# Patient Record
Sex: MALE | Race: WHITE | Employment: OTHER | URBAN - METROPOLITAN AREA
[De-identification: names, ages, dates, MRNs, and addresses within clinical notes are randomized per-mention and may not be internally consistent; named-entity substitution may affect disease eponyms.]

---

## 2017-01-04 ENCOUNTER — TELEPHONE (OUTPATIENT)
Dept: CARDIOTHORACIC SURGERY | Age: 82
End: 2017-01-04

## 2017-01-12 ENCOUNTER — ANTI-COAG VISIT (OUTPATIENT)
Dept: PHARMACY | Age: 82
End: 2017-01-12

## 2017-01-12 DIAGNOSIS — I48.19 PERSISTENT ATRIAL FIBRILLATION (HCC): ICD-10-CM

## 2017-01-12 LAB — INR BLD: 2

## 2017-01-19 ENCOUNTER — TELEPHONE (OUTPATIENT)
Dept: FAMILY MEDICINE CLINIC | Age: 82
End: 2017-01-19

## 2017-01-19 RX ORDER — LANCETS
EACH MISCELLANEOUS
COMMUNITY
End: 2017-01-19 | Stop reason: SDUPTHER

## 2017-01-19 RX ORDER — LANCETS
2-4 EACH MISCELLANEOUS DAILY
Qty: 100 EACH | Refills: 3 | Status: SHIPPED | OUTPATIENT
Start: 2017-01-19 | End: 2019-06-03 | Stop reason: SDUPTHER

## 2017-01-20 ENCOUNTER — TELEPHONE (OUTPATIENT)
Dept: FAMILY MEDICINE CLINIC | Age: 82
End: 2017-01-20

## 2017-02-08 ENCOUNTER — ANTI-COAG VISIT (OUTPATIENT)
Dept: PHARMACY | Age: 82
End: 2017-02-08

## 2017-02-08 DIAGNOSIS — I48.19 PERSISTENT ATRIAL FIBRILLATION (HCC): ICD-10-CM

## 2017-02-08 LAB
INR BLD: 2
PROTIME: 24.4 SECONDS

## 2017-02-10 ENCOUNTER — HOSPITAL ENCOUNTER (OUTPATIENT)
Dept: OTHER | Age: 82
Discharge: HOME | End: 2017-02-10
Attending: FAMILY MEDICINE | Admitting: FAMILY MEDICINE

## 2017-02-10 LAB
A/G RATIO: 1.1 (ref 1.1–2.2)
ALBUMIN SERPL-MCNC: 4 G/DL (ref 3.4–5)
ALP BLD-CCNC: 87 U/L (ref 40–129)
ALT SERPL-CCNC: 13 U/L (ref 10–40)
ANION GAP SERPL CALCULATED.3IONS-SCNC: 13 MMOL/L (ref 3–16)
AST SERPL-CCNC: 15 U/L (ref 15–37)
BASOPHILS ABSOLUTE: 0 K/UL (ref 0–0.2)
BASOPHILS RELATIVE PERCENT: 0.8 %
BILIRUB SERPL-MCNC: 0.9 MG/DL (ref 0–1)
BUN BLDV-MCNC: 12 MG/DL (ref 7–20)
CALCIUM SERPL-MCNC: 9.4 MG/DL (ref 8.3–10.6)
CHLORIDE BLD-SCNC: 101 MMOL/L (ref 99–110)
CHOLESTEROL, TOTAL: 116 MG/DL (ref 0–199)
CO2: 26 MMOL/L (ref 21–32)
CREAT SERPL-MCNC: 0.6 MG/DL (ref 0.8–1.3)
EOSINOPHILS ABSOLUTE: 0.2 K/UL (ref 0–0.6)
EOSINOPHILS RELATIVE PERCENT: 2.7 %
ESTIMATED AVERAGE GLUCOSE: 185.8 MG/DL
GFR AFRICAN AMERICAN: >60
GFR NON-AFRICAN AMERICAN: >60
GLOBULIN: 3.7 G/DL
GLUCOSE BLD-MCNC: 187 MG/DL (ref 70–99)
HBA1C MFR BLD: 8.1 %
HCT VFR BLD CALC: 43.4 % (ref 40.5–52.5)
HDLC SERPL-MCNC: 46 MG/DL (ref 40–60)
HEMOGLOBIN: 14.5 G/DL (ref 13.5–17.5)
LDL CHOLESTEROL CALCULATED: 53 MG/DL
LYMPHOCYTES ABSOLUTE: 0.7 K/UL (ref 1–5.1)
LYMPHOCYTES RELATIVE PERCENT: 11.3 %
MCH RBC QN AUTO: 31.6 PG (ref 26–34)
MCHC RBC AUTO-ENTMCNC: 33.4 G/DL (ref 31–36)
MCV RBC AUTO: 94.5 FL (ref 80–100)
MONOCYTES ABSOLUTE: 0.4 K/UL (ref 0–1.3)
MONOCYTES RELATIVE PERCENT: 6.4 %
NEUTROPHILS ABSOLUTE: 4.8 K/UL (ref 1.7–7.7)
NEUTROPHILS RELATIVE PERCENT: 78.8 %
PDW BLD-RTO: 14 % (ref 12.4–15.4)
PLATELET # BLD: 168 K/UL (ref 135–450)
PMV BLD AUTO: 8.1 FL (ref 5–10.5)
POTASSIUM SERPL-SCNC: 5.1 MMOL/L (ref 3.5–5.1)
RBC # BLD: 4.59 M/UL (ref 4.2–5.9)
SODIUM BLD-SCNC: 140 MMOL/L (ref 136–145)
TOTAL PROTEIN: 7.7 G/DL (ref 6.4–8.2)
TRIGL SERPL-MCNC: 85 MG/DL (ref 0–150)
VLDLC SERPL CALC-MCNC: 17 MG/DL
WBC # BLD: 6.1 K/UL (ref 4–11)

## 2017-02-16 ENCOUNTER — TELEPHONE (OUTPATIENT)
Dept: FAMILY MEDICINE CLINIC | Age: 82
End: 2017-02-16

## 2017-02-16 DIAGNOSIS — E11.9 DIABETES MELLITUS TYPE 2 IN NONOBESE (HCC): Primary | ICD-10-CM

## 2017-02-28 ENCOUNTER — TELEPHONE (OUTPATIENT)
Dept: FAMILY MEDICINE CLINIC | Age: 82
End: 2017-02-28

## 2017-03-01 ENCOUNTER — HOSPITAL ENCOUNTER (OUTPATIENT)
Dept: OTHER | Age: 82
Discharge: HOME | End: 2017-03-31
Attending: INTERNAL MEDICINE | Admitting: INTERNAL MEDICINE

## 2017-03-02 RX ORDER — GLIMEPIRIDE 1 MG/1
1 TABLET ORAL EVERY MORNING
Qty: 30 TABLET | Refills: 3 | Status: SHIPPED | OUTPATIENT
Start: 2017-03-02 | End: 2017-04-24 | Stop reason: SDUPTHER

## 2017-03-09 ENCOUNTER — ANTI-COAG VISIT (OUTPATIENT)
Dept: PHARMACY | Age: 82
End: 2017-03-09

## 2017-03-09 DIAGNOSIS — I48.19 PERSISTENT ATRIAL FIBRILLATION (HCC): ICD-10-CM

## 2017-03-09 LAB — INR BLD: 2

## 2017-03-27 RX ORDER — METOPROLOL SUCCINATE 100 MG/1
100 TABLET, EXTENDED RELEASE ORAL SEE ADMIN INSTRUCTIONS
Qty: 45 TABLET | Refills: 1 | Status: SHIPPED | OUTPATIENT
Start: 2017-03-27 | End: 2017-03-29 | Stop reason: DRUGHIGH

## 2017-03-29 ENCOUNTER — TELEPHONE (OUTPATIENT)
Dept: FAMILY MEDICINE CLINIC | Age: 82
End: 2017-03-29

## 2017-03-29 RX ORDER — METOPROLOL SUCCINATE 100 MG/1
TABLET, EXTENDED RELEASE ORAL
Qty: 45 TABLET | Refills: 1
Start: 2017-03-29 | End: 2017-09-27 | Stop reason: SDUPTHER

## 2017-04-06 ENCOUNTER — ANTI-COAG VISIT (OUTPATIENT)
Dept: PHARMACY | Age: 82
End: 2017-04-06

## 2017-04-06 DIAGNOSIS — I48.19 PERSISTENT ATRIAL FIBRILLATION (HCC): ICD-10-CM

## 2017-04-06 LAB — INR BLD: 2.2

## 2017-04-24 ENCOUNTER — TELEPHONE (OUTPATIENT)
Dept: FAMILY MEDICINE CLINIC | Age: 82
End: 2017-04-24

## 2017-04-24 RX ORDER — GLIMEPIRIDE 1 MG/1
1 TABLET ORAL EVERY MORNING
Qty: 90 TABLET | Refills: 1 | Status: SHIPPED | OUTPATIENT
Start: 2017-04-24 | End: 2017-09-27 | Stop reason: SDUPTHER

## 2017-04-24 RX ORDER — DILTIAZEM HYDROCHLORIDE 120 MG/1
120 CAPSULE, COATED, EXTENDED RELEASE ORAL DAILY
Qty: 90 CAPSULE | Refills: 1 | Status: SHIPPED | OUTPATIENT
Start: 2017-04-24 | End: 2017-09-27 | Stop reason: SDUPTHER

## 2017-05-04 ENCOUNTER — ANTI-COAG VISIT (OUTPATIENT)
Dept: PHARMACY | Age: 82
End: 2017-05-04

## 2017-05-04 DIAGNOSIS — I48.19 PERSISTENT ATRIAL FIBRILLATION (HCC): ICD-10-CM

## 2017-05-04 LAB
INR BLD: 2.8
PROTIME: 33.6 SECONDS

## 2017-05-10 ENCOUNTER — HOSPITAL ENCOUNTER (OUTPATIENT)
Dept: OTHER | Age: 82
Discharge: HOME | End: 2017-05-10
Attending: FAMILY MEDICINE | Admitting: FAMILY MEDICINE

## 2017-05-10 LAB
ESTIMATED AVERAGE GLUCOSE: 145.6 MG/DL
HBA1C MFR BLD: 6.7 %

## 2017-05-17 ENCOUNTER — TELEPHONE (OUTPATIENT)
Dept: FAMILY MEDICINE CLINIC | Age: 82
End: 2017-05-17

## 2017-06-01 ENCOUNTER — ANTI-COAG VISIT (OUTPATIENT)
Dept: PHARMACY | Age: 82
End: 2017-06-01

## 2017-06-01 DIAGNOSIS — I48.19 PERSISTENT ATRIAL FIBRILLATION (HCC): ICD-10-CM

## 2017-06-01 LAB
INR BLD: 2.8
PROTIME: 34 SECONDS

## 2017-07-06 ENCOUNTER — ANTI-COAG VISIT (OUTPATIENT)
Dept: PHARMACY | Age: 82
End: 2017-07-06

## 2017-07-06 DIAGNOSIS — I48.19 PERSISTENT ATRIAL FIBRILLATION (HCC): ICD-10-CM

## 2017-07-06 LAB
INR BLD: 2.1
PROTIME: 25.2 SECONDS

## 2017-07-28 ENCOUNTER — TELEPHONE (OUTPATIENT)
Dept: FAMILY MEDICINE CLINIC | Age: 82
End: 2017-07-28

## 2017-08-01 ENCOUNTER — ANTI-COAG VISIT (OUTPATIENT)
Dept: PHARMACY | Age: 82
End: 2017-08-01

## 2017-08-01 DIAGNOSIS — I48.19 PERSISTENT ATRIAL FIBRILLATION (HCC): ICD-10-CM

## 2017-08-01 LAB
INR BLD: 2.2
PROTIME: 26.6 SECONDS

## 2017-08-07 ENCOUNTER — HOSPITAL ENCOUNTER (OUTPATIENT)
Dept: OTHER | Age: 82
Discharge: HOME | End: 2017-08-07
Attending: FAMILY MEDICINE | Admitting: FAMILY MEDICINE

## 2017-08-07 LAB
A/G RATIO: 1.1 (ref 1.1–2.2)
ALBUMIN SERPL-MCNC: 4.1 G/DL (ref 3.4–5)
ALP BLD-CCNC: 73 U/L (ref 40–129)
ALT SERPL-CCNC: 14 U/L (ref 10–40)
ANION GAP SERPL CALCULATED.3IONS-SCNC: 15 MMOL/L (ref 3–16)
AST SERPL-CCNC: 19 U/L (ref 15–37)
BASOPHILS ABSOLUTE: 0 K/UL (ref 0–0.2)
BASOPHILS RELATIVE PERCENT: 0.5 %
BILIRUB SERPL-MCNC: 0.8 MG/DL (ref 0–1)
BUN BLDV-MCNC: 12 MG/DL (ref 7–20)
CALCIUM SERPL-MCNC: 9.4 MG/DL (ref 8.3–10.6)
CHLORIDE BLD-SCNC: 102 MMOL/L (ref 99–110)
CHOLESTEROL, TOTAL: 115 MG/DL (ref 0–199)
CO2: 25 MMOL/L (ref 21–32)
CREAT SERPL-MCNC: 0.7 MG/DL (ref 0.8–1.3)
EOSINOPHILS ABSOLUTE: 0.2 K/UL (ref 0–0.6)
EOSINOPHILS RELATIVE PERCENT: 3.4 %
ESTIMATED AVERAGE GLUCOSE: 137 MG/DL
GFR AFRICAN AMERICAN: >60
GFR NON-AFRICAN AMERICAN: >60
GLOBULIN: 3.6 G/DL
GLUCOSE BLD-MCNC: 135 MG/DL (ref 70–99)
HBA1C MFR BLD: 6.4 %
HCT VFR BLD CALC: 41.6 % (ref 40.5–52.5)
HDLC SERPL-MCNC: 47 MG/DL (ref 40–60)
HEMOGLOBIN: 14.2 G/DL (ref 13.5–17.5)
LDL CHOLESTEROL CALCULATED: 50 MG/DL
LYMPHOCYTES ABSOLUTE: 0.8 K/UL (ref 1–5.1)
LYMPHOCYTES RELATIVE PERCENT: 12.9 %
MCH RBC QN AUTO: 32.6 PG (ref 26–34)
MCHC RBC AUTO-ENTMCNC: 34.2 G/DL (ref 31–36)
MCV RBC AUTO: 95.6 FL (ref 80–100)
MONOCYTES ABSOLUTE: 0.5 K/UL (ref 0–1.3)
MONOCYTES RELATIVE PERCENT: 7.6 %
NEUTROPHILS ABSOLUTE: 4.8 K/UL (ref 1.7–7.7)
NEUTROPHILS RELATIVE PERCENT: 75.6 %
PDW BLD-RTO: 14.6 % (ref 12.4–15.4)
PLATELET # BLD: 178 K/UL (ref 135–450)
PMV BLD AUTO: 8.1 FL (ref 5–10.5)
POTASSIUM SERPL-SCNC: 4.2 MMOL/L (ref 3.5–5.1)
RBC # BLD: 4.36 M/UL (ref 4.2–5.9)
SODIUM BLD-SCNC: 142 MMOL/L (ref 136–145)
TOTAL PROTEIN: 7.7 G/DL (ref 6.4–8.2)
TRIGL SERPL-MCNC: 88 MG/DL (ref 0–150)
VLDLC SERPL CALC-MCNC: 18 MG/DL
WBC # BLD: 6.3 K/UL (ref 4–11)

## 2017-08-08 LAB — URINE CULTURE, ROUTINE: NORMAL

## 2017-08-21 ENCOUNTER — TELEPHONE (OUTPATIENT)
Dept: FAMILY MEDICINE CLINIC | Age: 82
End: 2017-08-21

## 2017-08-29 ENCOUNTER — ANTI-COAG VISIT (OUTPATIENT)
Dept: PHARMACY | Age: 82
End: 2017-08-29

## 2017-08-29 DIAGNOSIS — I48.19 PERSISTENT ATRIAL FIBRILLATION (HCC): ICD-10-CM

## 2017-08-29 LAB
INR BLD: 2.2
PROTIME: 27 SECONDS

## 2017-09-26 ENCOUNTER — ANTI-COAG VISIT (OUTPATIENT)
Dept: PHARMACY | Age: 82
End: 2017-09-26

## 2017-09-26 DIAGNOSIS — I48.19 PERSISTENT ATRIAL FIBRILLATION (HCC): ICD-10-CM

## 2017-09-26 LAB
INR BLD: 1.8
PROTIME: 21.2 SECONDS

## 2017-10-24 ENCOUNTER — ANTI-COAG VISIT (OUTPATIENT)
Dept: PHARMACY | Age: 82
End: 2017-10-24

## 2017-10-24 DIAGNOSIS — I48.19 PERSISTENT ATRIAL FIBRILLATION (HCC): ICD-10-CM

## 2017-10-24 LAB
INR BLD: 1.8
PROTIME: 21.8 SECONDS

## 2017-10-25 ENCOUNTER — TELEPHONE (OUTPATIENT)
Dept: FAMILY MEDICINE CLINIC | Age: 82
End: 2017-10-25

## 2017-10-25 NOTE — TELEPHONE ENCOUNTER
metoprolol succinate (TOPROL XL) 100 MG extended release tablet 45 tablet 0 9/27/2017     Sig: TAKE 1/2 TABLET EVERY DAY        glimepiride (AMARYL) 1 MG tablet 90 tablet 0 9/27/2017     Sig: TAKE 1 TABLET EVERY MORNING        diltiazem (CARDIZEM CD) 120 MG extended release capsule (Discontinued) 90 capsule 1 4/24/2017 9/27/2017    Sig - Route:  Take 1 capsule by mouth daily - Oral        All the above should be 90-day supply    [facility]

## 2017-10-26 RX ORDER — METOPROLOL SUCCINATE 100 MG/1
TABLET, EXTENDED RELEASE ORAL
Qty: 45 TABLET | Refills: 0 | Status: SHIPPED | OUTPATIENT
Start: 2017-10-26 | End: 2018-03-23 | Stop reason: SDUPTHER

## 2017-10-26 RX ORDER — GLIMEPIRIDE 1 MG/1
TABLET ORAL
Qty: 90 TABLET | Refills: 0 | Status: SHIPPED | OUTPATIENT
Start: 2017-10-26 | End: 2018-03-23 | Stop reason: SDUPTHER

## 2017-10-26 RX ORDER — DILTIAZEM HYDROCHLORIDE 120 MG/1
CAPSULE, COATED, EXTENDED RELEASE ORAL
Qty: 90 CAPSULE | Refills: 0 | Status: SHIPPED | OUTPATIENT
Start: 2017-10-26 | End: 2018-03-23 | Stop reason: SDUPTHER

## 2017-11-01 ENCOUNTER — HOSPITAL ENCOUNTER (OUTPATIENT)
Dept: OTHER | Age: 82
Discharge: HOME | End: 2017-11-30
Attending: INTERNAL MEDICINE | Admitting: INTERNAL MEDICINE

## 2017-11-21 ENCOUNTER — ANTI-COAG VISIT (OUTPATIENT)
Dept: PHARMACY | Age: 82
End: 2017-11-21

## 2017-11-21 DIAGNOSIS — I48.19 PERSISTENT ATRIAL FIBRILLATION (HCC): ICD-10-CM

## 2017-11-21 LAB
INR BLD: 2.1
PROTIME: 25 SECONDS

## 2017-12-01 ENCOUNTER — HOSPITAL ENCOUNTER (OUTPATIENT)
Dept: OTHER | Age: 82
Discharge: HOME | End: 2017-12-31
Attending: INTERNAL MEDICINE | Admitting: INTERNAL MEDICINE

## 2017-12-19 ENCOUNTER — ANTI-COAG VISIT (OUTPATIENT)
Dept: PHARMACY | Age: 82
End: 2017-12-19

## 2017-12-19 DIAGNOSIS — I48.19 PERSISTENT ATRIAL FIBRILLATION (HCC): ICD-10-CM

## 2017-12-19 LAB
INR BLD: 2.2
PROTIME: 26.2 SECONDS

## 2017-12-21 ENCOUNTER — TELEPHONE (OUTPATIENT)
Dept: VASCULAR SURGERY | Age: 82
End: 2017-12-21

## 2017-12-21 DIAGNOSIS — I65.23 BILATERAL CAROTID ARTERY STENOSIS: Primary | ICD-10-CM

## 2017-12-22 ENCOUNTER — OFFICE VISIT (OUTPATIENT)
Dept: VASCULAR SURGERY | Age: 82
End: 2017-12-22

## 2017-12-22 ENCOUNTER — HOSPITAL ENCOUNTER (OUTPATIENT)
Dept: OTHER | Age: 82
Discharge: HOME | End: 2017-12-22

## 2017-12-22 VITALS — HEIGHT: 70 IN | BODY MASS INDEX: 30.35 KG/M2 | WEIGHT: 212 LBS

## 2017-12-22 DIAGNOSIS — I77.9 BILATERAL CAROTID ARTERY DISEASE (HCC): Primary | ICD-10-CM

## 2017-12-22 DIAGNOSIS — I65.23 CAROTID ATHEROSCLEROSIS, BILATERAL: Primary | ICD-10-CM

## 2017-12-22 PROCEDURE — G8484 FLU IMMUNIZE NO ADMIN: HCPCS | Performed by: SURGERY

## 2017-12-22 PROCEDURE — 1036F TOBACCO NON-USER: CPT | Performed by: SURGERY

## 2017-12-22 PROCEDURE — 99213 OFFICE O/P EST LOW 20 MIN: CPT | Performed by: SURGERY

## 2017-12-22 PROCEDURE — 1123F ACP DISCUSS/DSCN MKR DOCD: CPT | Performed by: SURGERY

## 2017-12-22 PROCEDURE — 4040F PNEUMOC VAC/ADMIN/RCVD: CPT | Performed by: SURGERY

## 2017-12-22 PROCEDURE — G8598 ASA/ANTIPLAT THER USED: HCPCS | Performed by: SURGERY

## 2017-12-22 PROCEDURE — G8427 DOCREV CUR MEDS BY ELIG CLIN: HCPCS | Performed by: SURGERY

## 2017-12-22 PROCEDURE — G8417 CALC BMI ABV UP PARAM F/U: HCPCS | Performed by: SURGERY

## 2017-12-22 NOTE — PROGRESS NOTES
TAKE 1 TABLET EVERY MORNING 90 tablet 0    diltiazem (CARTIA XT) 120 MG extended release capsule TAKE 1 CAPSULE EVERY DAY 90 capsule 0    glucose blood VI test strips (ONETOUCH VERIO) strip 1 each by In Vitro route See Admin Instructions Test 2-4 times a day E11.9 100 each 3    dorzolamide (TRUSOPT) 2 % ophthalmic solution Place 1 drop into both eyes 3 times daily      simvastatin (ZOCOR) 20 MG tablet Take 1 tablet by mouth nightly 90 tablet 3    Lancets Thin MISC 2-4 Devices by Does not apply route daily 2-4 leilani daily 100 each 3    finasteride (PROSCAR) 5 MG tablet Take 5 mg by mouth daily      tamsulosin (FLOMAX) 0.4 MG capsule Take 1 capsule by mouth daily. (Patient taking differently:   Take 0.4 mg by mouth 2 times daily ) 90 capsule 3    warfarin (COUMADIN) 5 MG tablet Take 7.5 mg (one and a half tablets) coumadin every evening until directed otherwise by Dr. Crystal Rutledge (Patient taking differently: Take 5 mg by mouth See Admin Instructions. 7.5mg on Thu and 5mg all other days) 90 tablet 3    brimonidine-timolol (COMBIGAN) 0.2-0.5 % ophthalmic solution Place 1 drop into both eyes every 12 hours.  latanoprost (XALATAN) 0.005 % ophthalmic solution 1 drop nightly. Right eye       No current facility-administered medications for this visit. Allergies:  Hydrochlorothiazide; Metformin and related; and Morphine     Review of Systems:   · Constitutional: there has been no unanticipated weight loss. There's been no change in energy level, sleep pattern, or activity level. · Eyes: No visual changes or diplopia. No scleral icterus. · ENT: No Headaches, hearing loss or vertigo. No mouth sores or sore throat. · Cardiovascular: Reviewed in HPI  · Respiratory: No cough or wheezing, no sputum production. No hematemesis. · Gastrointestinal: No abdominal pain, appetite loss, blood in stools. No change in bowel or bladder habits.   · Genitourinary: No dysuria, trouble voiding, or nonobese (HCC)    Cancer of colon ([facility] [address]    Carotid artery disease ([facility] [address]    Cataract    Glaucoma    BPH with urinary obstruction       Plan:  1. Carotid atherosclerosis, bilateral  72-year-old male with stable asymptomatic carotid atherosclerosis. No interval progression of his carotid duplex scan. Continue current medical regimen. Repeat carotid duplex scan in 1 year. - VL DUP CAROTID BILATERAL; Future        Thank you for allowing me to participate in the care of this individual.  Please do not hesitate to contact me with any questions. Hola Garvey M.D., FACS.  12/22/2017  9:50 AM

## 2018-01-01 ENCOUNTER — HOSPITAL ENCOUNTER (OUTPATIENT)
Dept: OTHER | Age: 83
Discharge: HOME | End: 2018-01-31
Attending: INTERNAL MEDICINE | Admitting: INTERNAL MEDICINE

## 2018-01-18 ENCOUNTER — ANTI-COAG VISIT (OUTPATIENT)
Dept: PHARMACY | Age: 83
End: 2018-01-18

## 2018-01-18 DIAGNOSIS — I48.19 PERSISTENT ATRIAL FIBRILLATION (HCC): ICD-10-CM

## 2018-01-18 LAB
INR BLD: 1.7
PROTIME: 20.7 SECONDS

## 2018-02-01 ENCOUNTER — HOSPITAL ENCOUNTER (OUTPATIENT)
Dept: OTHER | Age: 83
Discharge: HOME | End: 2018-02-28
Attending: INTERNAL MEDICINE | Admitting: INTERNAL MEDICINE

## 2018-02-06 ENCOUNTER — OFFICE VISIT (OUTPATIENT)
Dept: INTERNAL MEDICINE CLINIC | Age: 83
End: 2018-02-06

## 2018-02-06 VITALS
SYSTOLIC BLOOD PRESSURE: 122 MMHG | DIASTOLIC BLOOD PRESSURE: 76 MMHG | HEIGHT: 70 IN | HEART RATE: 104 BPM | WEIGHT: 224.4 LBS | BODY MASS INDEX: 32.13 KG/M2

## 2018-02-06 DIAGNOSIS — N40.1 BPH WITH URINARY OBSTRUCTION: ICD-10-CM

## 2018-02-06 DIAGNOSIS — I48.19 PERSISTENT ATRIAL FIBRILLATION (HCC): ICD-10-CM

## 2018-02-06 DIAGNOSIS — N13.8 BPH WITH URINARY OBSTRUCTION: ICD-10-CM

## 2018-02-06 DIAGNOSIS — E78.49 OTHER HYPERLIPIDEMIA: ICD-10-CM

## 2018-02-06 DIAGNOSIS — I10 ESSENTIAL HYPERTENSION: ICD-10-CM

## 2018-02-06 DIAGNOSIS — E11.9 DIABETES MELLITUS TYPE 2 IN NONOBESE (HCC): Primary | ICD-10-CM

## 2018-02-06 LAB — HBA1C MFR BLD: 6.7 %

## 2018-02-06 PROCEDURE — G8427 DOCREV CUR MEDS BY ELIG CLIN: HCPCS | Performed by: INTERNAL MEDICINE

## 2018-02-06 PROCEDURE — G8484 FLU IMMUNIZE NO ADMIN: HCPCS | Performed by: INTERNAL MEDICINE

## 2018-02-06 PROCEDURE — 1123F ACP DISCUSS/DSCN MKR DOCD: CPT | Performed by: INTERNAL MEDICINE

## 2018-02-06 PROCEDURE — 83036 HEMOGLOBIN GLYCOSYLATED A1C: CPT | Performed by: INTERNAL MEDICINE

## 2018-02-06 PROCEDURE — 99204 OFFICE O/P NEW MOD 45 MIN: CPT | Performed by: INTERNAL MEDICINE

## 2018-02-06 PROCEDURE — 4040F PNEUMOC VAC/ADMIN/RCVD: CPT | Performed by: INTERNAL MEDICINE

## 2018-02-06 PROCEDURE — G8598 ASA/ANTIPLAT THER USED: HCPCS | Performed by: INTERNAL MEDICINE

## 2018-02-06 PROCEDURE — 1036F TOBACCO NON-USER: CPT | Performed by: INTERNAL MEDICINE

## 2018-02-06 PROCEDURE — G8417 CALC BMI ABV UP PARAM F/U: HCPCS | Performed by: INTERNAL MEDICINE

## 2018-02-06 RX ORDER — TAMSULOSIN HYDROCHLORIDE 0.4 MG/1
0.4 CAPSULE ORAL DAILY
Qty: 90 CAPSULE | Refills: 0 | Status: SHIPPED | OUTPATIENT
Start: 2018-02-06 | End: 2018-05-24 | Stop reason: SDUPTHER

## 2018-02-06 NOTE — PROGRESS NOTES
daily 2-4 leilani daily 100 each 3    finasteride (PROSCAR) 5 MG tablet Take 5 mg by mouth daily      tamsulosin (FLOMAX) 0.4 MG capsule Take 1 capsule by mouth daily. 90 capsule 3    warfarin (COUMADIN) 5 MG tablet Take 7.5 mg (one and a half tablets) coumadin every evening until directed otherwise by Dr. Yo Castellanos (Patient taking differently: Take 5 mg by mouth See Admin Instructions. 7.5mg on Thu and 5mg all other days) 90 tablet 3    brimonidine-timolol (COMBIGAN) 0.2-0.5 % ophthalmic solution Place 1 drop into both eyes every 12 hours.  latanoprost (XALATAN) 0.005 % ophthalmic solution 1 drop nightly. Right eye       No current facility-administered medications on file prior to visit.        Past Medical History:   Diagnosis Date    A-fib [facility])     Acute MI     Arthritis     Atrial fibrillation ([facility] [address]     CAD (coronary artery disease)     Cancer of colon ([facility] [address]     Glaucoma     Gout, unspecified     Hypertension     Hypertrophy of prostate without urinary obstruction and other lower urinary tract symptoms (LUTS)     Other and unspecified hyperlipidemia     Type II or unspecified type diabetes mellitus without mention of complication, uncontrolled       Past Surgical History:   Procedure Laterality Date    ABDOMEN SURGERY  1992    colon    CARDIAC SURGERY      x6    CAROTID ENDARTERECTOMY Left 2/24/13    CATARACT REMOVAL      left    COLON SURGERY      COLONOSCOPY      CORONARY ANGIOPLASTY WITH STENT PLACEMENT      6 stents  12/24/05    CYSTOSCOPY  3-27-14    EYE SURGERY      drain    JOINT REPLACEMENT  2010    right    KNEE ARTHROPLASTY  12/14/10    right    OTHER SURGICAL HISTORY      post-op reaction to pain med- very combative    REVISION TOTAL KNEE ARTHROPLASTY  8/17/11    Tibial liner exchange & synovectomy      Social History   Substance Use Topics    Smoking status: Former Smoker     Packs/day: 1.00     Years: 30.00     Quit date: 8/2/1976    Smokeless tobacco: Never Used Comment: quit 30 years ago    Alcohol use 6.0 oz/week     10 Cans of beer per week      Family History   Problem Relation Age of Onset    High Blood Pressure Mother     Heart Disease Mother     Stroke Mother     High Blood Pressure Father     High Cholesterol Neg Hx         Review of Systems  Complete ROS filled out by patient, reviewed by me, and scanned into the record. Objective:   Physical Exam  /76   Pulse 104 Comment: irregular  Ht 5' 10\" (1.778 m)   Wt 224 lb 6.4 oz (101.8 kg)   BMI 32.20 kg/m²    Gen: WN/WD, no acute distress  Head: normocephalic, atraumatic  Eyes: no icterus, no conjunctival erythema. Pupils reactive to light. ENT: Moist mucous membranes, normal nose, OP pink and moist  Neck: supple, no masses  CV: regular rate and rhythm, no murmurs, rubs, gallops. Pedal pulses 2+, symmetric  Resp: Normal effort, clear to auscultation bilaterally  Abd: +Bowel Sounds, soft, non tender to palpation. MSK: no joint swelling, no cyanosis or clubbing  Skin: warm, dry, no rashes visualized  Neuro: Cranial Nerves intact, sensation intact to light touch  Psych: normal mood and affect. Appropriately oriented.      Lab Results   Component Value Date    CREATININE 0.7 (L) 08/07/2017    BUN 12 08/07/2017     08/07/2017    K 4.2 08/07/2017     08/07/2017    CO2 25 08/07/2017      Lab Results   Component Value Date    CHOL 115 08/07/2017    CHOL 116 02/10/2017    CHOL 106 12/08/2016     Lab Results   Component Value Date    TRIG 88 08/07/2017    TRIG 85 02/10/2017    TRIG 90 12/08/2016     Lab Results   Component Value Date    HDL 47 08/07/2017    HDL 46 02/10/2017    HDL 44 12/08/2016     Lab Results   Component Value Date    LDLCALC 50 08/07/2017    LDLCALC 53 02/10/2017    LDLCALC 44 12/08/2016     Lab Results   Component Value Date    LABVLDL 18 08/07/2017    LABVLDL 17 02/10/2017    LABVLDL 18 12/08/2016     No results found for: CHOLHDLRATIO     Echo: 2/17/14: Nl LVEF, concentric LVH  Assessment/Plan:      1. Diabetes mellitus type 2 in nonobese [facility])  He has optimal glycemic control. We discussed the importance of avoiding hypoglycemia. He has not had any recent hypoglycemia issues. Continue low-dose Amaryl. Continue glucose monitoring. Continue healthy lifestyle. - POCT glycosylated hemoglobin (Hb A1C)    2. Persistent atrial fibrillation ([address]  He is asymptomatic. He is treated with rate control and anticoagulation. Continue current management. 3. BPH with urinary obstruction  Symptoms are well-controlled on Flomax. Continue current management. 4. Essential hypertension  Blood pressure is at target. Blood testing reviewed and up-to-date. Continue current medications. 5. Other hyperlipidemia  Lipids are well-controlled. Continue simvastatin for secondary prevention.             RTO 4 months or PRN

## 2018-02-08 ENCOUNTER — ANTI-COAG VISIT (OUTPATIENT)
Dept: PHARMACY | Age: 83
End: 2018-02-08

## 2018-02-08 DIAGNOSIS — I48.19 PERSISTENT ATRIAL FIBRILLATION (HCC): ICD-10-CM

## 2018-02-08 LAB
INR BLD: 3
PROTIME: 36.4 SECONDS

## 2018-02-22 ENCOUNTER — TELEPHONE (OUTPATIENT)
Dept: CARDIOLOGY CLINIC | Age: 83
End: 2018-02-22

## 2018-02-27 ENCOUNTER — OFFICE VISIT (OUTPATIENT)
Dept: INTERNAL MEDICINE CLINIC | Age: 83
End: 2018-02-27

## 2018-02-27 VITALS
HEART RATE: 88 BPM | BODY MASS INDEX: 31.78 KG/M2 | DIASTOLIC BLOOD PRESSURE: 72 MMHG | WEIGHT: 222 LBS | SYSTOLIC BLOOD PRESSURE: 120 MMHG | HEIGHT: 70 IN

## 2018-02-27 DIAGNOSIS — H40.9 GLAUCOMA OF LEFT EYE, UNSPECIFIED GLAUCOMA TYPE: ICD-10-CM

## 2018-02-27 DIAGNOSIS — Z01.818 PREOP EXAMINATION: Primary | ICD-10-CM

## 2018-02-27 PROCEDURE — G8598 ASA/ANTIPLAT THER USED: HCPCS | Performed by: NURSE PRACTITIONER

## 2018-02-27 PROCEDURE — G8428 CUR MEDS NOT DOCUMENT: HCPCS | Performed by: NURSE PRACTITIONER

## 2018-02-27 PROCEDURE — 4040F PNEUMOC VAC/ADMIN/RCVD: CPT | Performed by: NURSE PRACTITIONER

## 2018-02-27 PROCEDURE — 93000 ELECTROCARDIOGRAM COMPLETE: CPT | Performed by: NURSE PRACTITIONER

## 2018-02-27 PROCEDURE — 99214 OFFICE O/P EST MOD 30 MIN: CPT | Performed by: NURSE PRACTITIONER

## 2018-02-27 PROCEDURE — G8484 FLU IMMUNIZE NO ADMIN: HCPCS | Performed by: NURSE PRACTITIONER

## 2018-02-27 PROCEDURE — G8417 CALC BMI ABV UP PARAM F/U: HCPCS | Performed by: NURSE PRACTITIONER

## 2018-02-27 PROCEDURE — 1036F TOBACCO NON-USER: CPT | Performed by: NURSE PRACTITIONER

## 2018-02-27 PROCEDURE — 1123F ACP DISCUSS/DSCN MKR DOCD: CPT | Performed by: NURSE PRACTITIONER

## 2018-02-27 NOTE — PROGRESS NOTES
Preoperative Consultation      Gemini Aaron  YOB: 1930    Date of Service:  2/27/2018    This patient presents today at the request of the surgeon for a preoperative consultation. Surgeon: Dr. Andrea Zimmer  Indication for surgery: Left eye glaucoma  Scheduled procedure: Left eye glaucoma  Date of surgery: 3/2/18  [facility] of surgery: [facility]  Indicated/required preoperative testing: EKG  Smoker: No  Previous anesthesia complications: No    Family history of anesthesia complications: No  Loose, capped or false teeth: No  Recent chest pain or SOB: No  Known Bleeding Risk: No recent or remote history of abnormal bleeding. Anticoagulant therapy- warfarin  Personal or FH of DVT/PE: No    Patient objection to receiving blood products: No      Allergies   Allergen Reactions    Hydrochlorothiazide Other (See Comments)     Gout.      Metformin And Related Diarrhea    Morphine      PCA, caused confusion       Current Outpatient Prescriptions   Medication Sig Dispense Refill    Bimatoprost (LUMIGAN OP) Apply to eye nightly      tamsulosin (FLOMAX) 0.4 MG capsule Take 1 capsule by mouth daily 90 capsule 0    metoprolol succinate (TOPROL XL) 100 MG extended release tablet TAKE 1/2 TABLET EVERY DAY 45 tablet 0    glimepiride (AMARYL) 1 MG tablet TAKE 1 TABLET EVERY MORNING 90 tablet 0    diltiazem (CARTIA XT) 120 MG extended release capsule TAKE 1 CAPSULE EVERY DAY 90 capsule 0    glucose blood VI test strips (ONETOUCH VERIO) strip 1 each by In Vitro route See Admin Instructions Test 2-4 times a day E11.9 100 each 3    dorzolamide (TRUSOPT) 2 % ophthalmic solution Place 1 drop into both eyes 2 times daily       simvastatin (ZOCOR) 20 MG tablet Take 1 tablet by mouth nightly 90 tablet 3    Lancets Thin MISC 2-4 Devices by Does not apply route daily 2-4 leilani daily 100 each 3    warfarin (COUMADIN) 5 MG tablet Take 7.5 mg (one and a half tablets) coumadin every evening until directed otherwise by

## 2018-03-01 ENCOUNTER — HOSPITAL ENCOUNTER (OUTPATIENT)
Dept: OTHER | Age: 83
Discharge: HOME | End: 2018-03-31
Attending: INTERNAL MEDICINE | Admitting: INTERNAL MEDICINE

## 2018-03-02 ENCOUNTER — HOSPITAL ENCOUNTER (OUTPATIENT)
Dept: SURGERY | Age: 83
Discharge: HOME | End: 2018-03-02
Attending: OPHTHALMOLOGY | Admitting: OPHTHALMOLOGY

## 2018-03-02 VITALS
DIASTOLIC BLOOD PRESSURE: 29 MMHG | OXYGEN SATURATION: 99 % | HEIGHT: 70 IN | HEART RATE: 84 BPM | WEIGHT: 222 LBS | RESPIRATION RATE: 20 BRPM | TEMPERATURE: 97.6 F | SYSTOLIC BLOOD PRESSURE: 128 MMHG | BODY MASS INDEX: 31.78 KG/M2

## 2018-03-02 LAB
GLUCOSE BLD-MCNC: 104 MG/DL (ref 70–99)
GLUCOSE BLD-MCNC: 125 MG/DL (ref 70–99)
PERFORMED ON: ABNORMAL
PERFORMED ON: ABNORMAL

## 2018-03-02 RX ORDER — SODIUM CHLORIDE, SODIUM LACTATE, POTASSIUM CHLORIDE, CALCIUM CHLORIDE 600; 310; 30; 20 MG/100ML; MG/100ML; MG/100ML; MG/100ML
INJECTION, SOLUTION INTRAVENOUS CONTINUOUS
Status: DISCONTINUED | OUTPATIENT
Start: 2018-03-02 | End: 2018-03-03 | Stop reason: HOSPADM

## 2018-03-02 RX ADMIN — SODIUM CHLORIDE, SODIUM LACTATE, POTASSIUM CHLORIDE, CALCIUM CHLORIDE: 600; 310; 30; 20 INJECTION, SOLUTION INTRAVENOUS at 12:09

## 2018-03-02 ASSESSMENT — PAIN SCALES - GENERAL
PAINLEVEL_OUTOF10: 0

## 2018-03-02 ASSESSMENT — PAIN - FUNCTIONAL ASSESSMENT: PAIN_FUNCTIONAL_ASSESSMENT: 0-10

## 2018-03-02 NOTE — ANESTHESIA PRE-OP
Department of Anesthesiology  Preprocedure Note       Name:  Corrina Greenwood   Age:  80 y.o.  :  1930                                          MRN:  [id number]         Date:  3/2/2018      Surgeon:    Procedure:    Medications prior to admission:   Prior to Admission medications    Medication Sig Start Date End Date Taking? Authorizing Provider   Bimatoprost (LUMIGAN OP) Apply to eye nightly   Yes Historical Provider, MD   tamsulosin (FLOMAX) 0.4 MG capsule Take 1 capsule by mouth daily 18  Yes Darlin Ames MD   metoprolol succinate (TOPROL XL) 100 MG extended release tablet TAKE 1/2 TABLET EVERY DAY 10/26/17  Yes Hector Stevenson MD   glimepiride (AMARYL) 1 MG tablet TAKE 1 TABLET EVERY MORNING 10/26/17  Yes Hector Stevenson MD   diltiazem (CARTIA XT) 120 MG extended release capsule TAKE 1 CAPSULE EVERY DAY 10/26/17  Yes Hector Stevenson MD   glucose blood VI test strips (ONETOUCH VERIO) strip 1 each by In Vitro route See Admin Instructions Test 2-4 times a day E11.9 17  Yes Hector Stevenson MD   dorzolamide (TRUSOPT) 2 % ophthalmic solution Place 1 drop into both eyes 2 times daily    Yes Historical Provider, MD   simvastatin (ZOCOR) 20 MG tablet Take 1 tablet by mouth nightly 17  Yes Hector Stevenson MD   brimonidine-timolol (COMBIGAN) 0.2-0.5 % ophthalmic solution Place 1 drop into both eyes every 12 hours. Yes Historical Provider, MD   Lancets Thin MISC 2-4 Devices by Does not apply route daily 2-4 leilani daily 17   Hector Stevenson MD   warfarin (COUMADIN) 5 MG tablet Take 7.5 mg (one and a half tablets) coumadin every evening until directed otherwise by Dr. Sascha Martinez  Patient taking differently: Take 5 mg by mouth See Admin Instructions.  7.5mg on Thu and 5mg all other days 12   Sven Horton MD       Current medications:    Current Outpatient Prescriptions   Medication Sig Dispense Refill    Bimatoprost (LUMIGAN OP) Apply to eye nightly      tamsulosin (FLOMAX) 0.4 MG capsule

## 2018-03-02 NOTE — PROGRESS NOTES
5)  POCT  Blood Glucose:  125      (Normal Range 70-99)    PT:      (Normal Range 10-12. 8)    INR:      (Normal Range 0.85-1.15)

## 2018-03-03 NOTE — OP NOTE
was  isolated on a muscle hook and the nasal edge of the Baerveldt implant was  placed beneath the muscle bell of the superior rectus muscle. The temporal  edge of the implant was placed under the lateral rectus muscle. The  implant was then secured to the sclera with two 9-0 nylon sutures. A 3-0  Prolene suture was then placed into the anterior chamber with a 23 gauge  needle on a syringe of viscoelastic and the anterior chamber tube was then  inserted into the eye through the stab incision. The anterior chamber tube  was secured into position with four 10-0 nylon sutures. A 7-0 Prolene  ligation suture was then placed around the anterior chamber tube just at  its exit from the plate and tied in a releasable fashion. The 3-0 Prolene  and 7-0 Prolene sutures were then passed into the subconjunctival space to  exit deep in the inferior fornix. There, the ends blunted with thermal  cautery. The Tenons layer was then closed with a combination of  interrupted and running 10-0 nylon sutures on a vascular needle. A small  paracentesis was made temporally to irrigate residual viscoelastic from the  anterior chamber at the conclusion of the procedure. The patient was then  given a subconjunctival injection of 5 mg of dexamethasone and a collagen  shield hydrated in TobraDex ophthalmic drops was placed on the eye. A  sterile ophthalmic pad was placed over the eye and the patient was taken to  the recovery room in a satisfactory condition.         Jose Sparks MD    D: 03/02/2018 17:43:19       T: 03/02/2018 18:41:15     [email or web address]  Job#: [id number]     Doc#: [id number]    CC:

## 2018-03-13 ENCOUNTER — ANTI-COAG VISIT (OUTPATIENT)
Dept: PHARMACY | Age: 83
End: 2018-03-13

## 2018-03-13 DIAGNOSIS — I48.19 PERSISTENT ATRIAL FIBRILLATION (HCC): ICD-10-CM

## 2018-03-13 LAB
INR BLD: 1.9
PROTIME: 23.3 SECONDS

## 2018-03-23 ENCOUNTER — TELEPHONE (OUTPATIENT)
Dept: INTERNAL MEDICINE CLINIC | Age: 83
End: 2018-03-23

## 2018-03-23 RX ORDER — GLIMEPIRIDE 1 MG/1
TABLET ORAL
Qty: 90 TABLET | Refills: 3 | Status: SHIPPED | OUTPATIENT
Start: 2018-03-23 | End: 2019-02-12 | Stop reason: SDUPTHER

## 2018-03-23 RX ORDER — DILTIAZEM HYDROCHLORIDE 120 MG/1
CAPSULE, COATED, EXTENDED RELEASE ORAL
Qty: 90 CAPSULE | Refills: 3 | Status: SHIPPED | OUTPATIENT
Start: 2018-03-23 | End: 2019-02-12 | Stop reason: SDUPTHER

## 2018-03-23 RX ORDER — METOPROLOL SUCCINATE 100 MG/1
TABLET, EXTENDED RELEASE ORAL
Qty: 45 TABLET | Refills: 3 | Status: SHIPPED | OUTPATIENT
Start: 2018-03-23 | End: 2019-02-12 | Stop reason: SDUPTHER

## 2018-03-23 RX ORDER — SIMVASTATIN 20 MG
20 TABLET ORAL NIGHTLY
Qty: 90 TABLET | Refills: 3 | Status: SHIPPED | OUTPATIENT
Start: 2018-03-23 | End: 2019-02-12 | Stop reason: SDUPTHER

## 2018-04-01 ENCOUNTER — HOSPITAL ENCOUNTER (OUTPATIENT)
Dept: OTHER | Age: 83
Discharge: HOME | End: 2018-04-30
Attending: INTERNAL MEDICINE | Admitting: INTERNAL MEDICINE

## 2018-04-12 ENCOUNTER — OFFICE VISIT (OUTPATIENT)
Dept: INTERNAL MEDICINE CLINIC | Age: 83
End: 2018-04-12

## 2018-04-12 VITALS
BODY MASS INDEX: 31.35 KG/M2 | DIASTOLIC BLOOD PRESSURE: 72 MMHG | WEIGHT: 219 LBS | HEART RATE: 94 BPM | HEIGHT: 70 IN | SYSTOLIC BLOOD PRESSURE: 116 MMHG

## 2018-04-12 DIAGNOSIS — H40.9 GLAUCOMA OF LEFT EYE, UNSPECIFIED GLAUCOMA TYPE: ICD-10-CM

## 2018-04-12 DIAGNOSIS — Z01.818 PREOPERATIVE EXAMINATION: Primary | ICD-10-CM

## 2018-04-12 PROCEDURE — G8598 ASA/ANTIPLAT THER USED: HCPCS | Performed by: NURSE PRACTITIONER

## 2018-04-12 PROCEDURE — 4040F PNEUMOC VAC/ADMIN/RCVD: CPT | Performed by: NURSE PRACTITIONER

## 2018-04-12 PROCEDURE — G8427 DOCREV CUR MEDS BY ELIG CLIN: HCPCS | Performed by: NURSE PRACTITIONER

## 2018-04-12 PROCEDURE — 99214 OFFICE O/P EST MOD 30 MIN: CPT | Performed by: NURSE PRACTITIONER

## 2018-04-12 PROCEDURE — 1036F TOBACCO NON-USER: CPT | Performed by: NURSE PRACTITIONER

## 2018-04-12 PROCEDURE — 1123F ACP DISCUSS/DSCN MKR DOCD: CPT | Performed by: NURSE PRACTITIONER

## 2018-04-12 PROCEDURE — G8417 CALC BMI ABV UP PARAM F/U: HCPCS | Performed by: NURSE PRACTITIONER

## 2018-04-13 ENCOUNTER — HOSPITAL ENCOUNTER (OUTPATIENT)
Dept: SURGERY | Age: 83
Discharge: HOME | End: 2018-04-13
Attending: OPHTHALMOLOGY | Admitting: OPHTHALMOLOGY

## 2018-04-13 VITALS
TEMPERATURE: 98.6 F | HEIGHT: 70 IN | BODY MASS INDEX: 31.35 KG/M2 | OXYGEN SATURATION: 95 % | HEART RATE: 77 BPM | WEIGHT: 219 LBS | RESPIRATION RATE: 18 BRPM | DIASTOLIC BLOOD PRESSURE: 63 MMHG | SYSTOLIC BLOOD PRESSURE: 130 MMHG

## 2018-04-13 LAB
GLUCOSE BLD-MCNC: 127 MG/DL (ref 70–99)
GLUCOSE BLD-MCNC: 93 MG/DL (ref 70–99)
PERFORMED ON: ABNORMAL
PERFORMED ON: NORMAL

## 2018-04-13 RX ORDER — SODIUM CHLORIDE, SODIUM LACTATE, POTASSIUM CHLORIDE, CALCIUM CHLORIDE 600; 310; 30; 20 MG/100ML; MG/100ML; MG/100ML; MG/100ML
INJECTION, SOLUTION INTRAVENOUS CONTINUOUS
Status: DISCONTINUED | OUTPATIENT
Start: 2018-04-13 | End: 2018-04-14 | Stop reason: HOSPADM

## 2018-04-13 RX ADMIN — SODIUM CHLORIDE, SODIUM LACTATE, POTASSIUM CHLORIDE, CALCIUM CHLORIDE: 600; 310; 30; 20 INJECTION, SOLUTION INTRAVENOUS at 11:52

## 2018-04-13 ASSESSMENT — PAIN - FUNCTIONAL ASSESSMENT: PAIN_FUNCTIONAL_ASSESSMENT: 0-10

## 2018-04-13 ASSESSMENT — PAIN SCALES - GENERAL
PAINLEVEL_OUTOF10: 0

## 2018-04-26 ENCOUNTER — ANTI-COAG VISIT (OUTPATIENT)
Dept: PHARMACY | Age: 83
End: 2018-04-26

## 2018-04-26 DIAGNOSIS — I48.19 PERSISTENT ATRIAL FIBRILLATION (HCC): ICD-10-CM

## 2018-04-26 LAB
INR BLD: 2.6
PROTIME: 31.7 SECONDS

## 2018-05-01 ENCOUNTER — HOSPITAL ENCOUNTER (OUTPATIENT)
Dept: OTHER | Age: 83
Discharge: HOME | End: 2018-05-31
Attending: INTERNAL MEDICINE | Admitting: INTERNAL MEDICINE

## 2018-05-24 ENCOUNTER — TELEPHONE (OUTPATIENT)
Dept: INTERNAL MEDICINE CLINIC | Age: 83
End: 2018-05-24

## 2018-05-24 ENCOUNTER — ANTI-COAG VISIT (OUTPATIENT)
Dept: PHARMACY | Age: 83
End: 2018-05-24

## 2018-05-24 DIAGNOSIS — I48.19 PERSISTENT ATRIAL FIBRILLATION (HCC): ICD-10-CM

## 2018-05-24 LAB
INR BLD: 3.1
PROTIME: 37.1 SECONDS

## 2018-05-24 RX ORDER — TAMSULOSIN HYDROCHLORIDE 0.4 MG/1
0.4 CAPSULE ORAL DAILY
Qty: 90 CAPSULE | Refills: 0 | Status: SHIPPED | OUTPATIENT
Start: 2018-05-24 | End: 2018-09-10 | Stop reason: SDUPTHER

## 2018-05-24 RX ORDER — TAMSULOSIN HYDROCHLORIDE 0.4 MG/1
0.4 CAPSULE ORAL DAILY
Qty: 30 CAPSULE | Refills: 0 | Status: SHIPPED | OUTPATIENT
Start: 2018-05-24 | End: 2018-06-07 | Stop reason: SDUPTHER

## 2018-05-30 ENCOUNTER — OFFICE VISIT (OUTPATIENT)
Dept: CARDIOLOGY CLINIC | Age: 83
End: 2018-05-30

## 2018-05-30 ENCOUNTER — HOSPITAL ENCOUNTER (OUTPATIENT)
Dept: OTHER | Age: 83
Discharge: HOME | End: 2018-05-30
Attending: INTERNAL MEDICINE | Admitting: INTERNAL MEDICINE

## 2018-05-30 VITALS
WEIGHT: 223.12 LBS | BODY MASS INDEX: 31.94 KG/M2 | DIASTOLIC BLOOD PRESSURE: 70 MMHG | SYSTOLIC BLOOD PRESSURE: 122 MMHG | HEIGHT: 70 IN | HEART RATE: 88 BPM

## 2018-05-30 DIAGNOSIS — E78.49 OTHER HYPERLIPIDEMIA: Primary | ICD-10-CM

## 2018-05-30 DIAGNOSIS — I10 ESSENTIAL HYPERTENSION: ICD-10-CM

## 2018-05-30 DIAGNOSIS — I25.10 CORONARY ARTERY DISEASE INVOLVING NATIVE HEART WITHOUT ANGINA PECTORIS, UNSPECIFIED VESSEL OR LESION TYPE: ICD-10-CM

## 2018-05-30 DIAGNOSIS — E78.49 OTHER HYPERLIPIDEMIA: ICD-10-CM

## 2018-05-30 LAB
ALT SERPL-CCNC: 13 U/L (ref 10–40)
ANION GAP SERPL CALCULATED.3IONS-SCNC: 11 MMOL/L (ref 3–16)
AST SERPL-CCNC: 18 U/L (ref 15–37)
BUN BLDV-MCNC: 14 MG/DL (ref 7–20)
CALCIUM SERPL-MCNC: 9.3 MG/DL (ref 8.3–10.6)
CHLORIDE BLD-SCNC: 101 MMOL/L (ref 99–110)
CHOLESTEROL, TOTAL: 111 MG/DL (ref 0–199)
CO2: 27 MMOL/L (ref 21–32)
CREAT SERPL-MCNC: 0.6 MG/DL (ref 0.8–1.3)
GFR AFRICAN AMERICAN: >60
GFR NON-AFRICAN AMERICAN: >60
GLUCOSE BLD-MCNC: 169 MG/DL (ref 70–99)
HDLC SERPL-MCNC: 42 MG/DL (ref 40–60)
LDL CHOLESTEROL CALCULATED: 36 MG/DL
POTASSIUM SERPL-SCNC: 4.9 MMOL/L (ref 3.5–5.1)
SODIUM BLD-SCNC: 139 MMOL/L (ref 136–145)
TRIGL SERPL-MCNC: 165 MG/DL (ref 0–150)
VLDLC SERPL CALC-MCNC: 33 MG/DL

## 2018-05-30 PROCEDURE — 1123F ACP DISCUSS/DSCN MKR DOCD: CPT | Performed by: INTERNAL MEDICINE

## 2018-05-30 PROCEDURE — G8598 ASA/ANTIPLAT THER USED: HCPCS | Performed by: INTERNAL MEDICINE

## 2018-05-30 PROCEDURE — G8417 CALC BMI ABV UP PARAM F/U: HCPCS | Performed by: INTERNAL MEDICINE

## 2018-05-30 PROCEDURE — 1036F TOBACCO NON-USER: CPT | Performed by: INTERNAL MEDICINE

## 2018-05-30 PROCEDURE — G8427 DOCREV CUR MEDS BY ELIG CLIN: HCPCS | Performed by: INTERNAL MEDICINE

## 2018-05-30 PROCEDURE — 99214 OFFICE O/P EST MOD 30 MIN: CPT | Performed by: INTERNAL MEDICINE

## 2018-05-30 PROCEDURE — 4040F PNEUMOC VAC/ADMIN/RCVD: CPT | Performed by: INTERNAL MEDICINE

## 2018-06-01 ENCOUNTER — HOSPITAL ENCOUNTER (OUTPATIENT)
Dept: OTHER | Age: 83
Discharge: HOME | End: 2018-06-30
Attending: INTERNAL MEDICINE | Admitting: INTERNAL MEDICINE

## 2018-06-07 ENCOUNTER — OFFICE VISIT (OUTPATIENT)
Dept: INTERNAL MEDICINE CLINIC | Age: 83
End: 2018-06-07

## 2018-06-07 VITALS
HEIGHT: 70 IN | SYSTOLIC BLOOD PRESSURE: 134 MMHG | BODY MASS INDEX: 32.33 KG/M2 | WEIGHT: 225.8 LBS | DIASTOLIC BLOOD PRESSURE: 82 MMHG | HEART RATE: 96 BPM

## 2018-06-07 DIAGNOSIS — I10 ESSENTIAL HYPERTENSION: ICD-10-CM

## 2018-06-07 DIAGNOSIS — E11.9 DIABETES MELLITUS TYPE 2 IN NONOBESE (HCC): Primary | ICD-10-CM

## 2018-06-07 DIAGNOSIS — N40.1 BPH WITH URINARY OBSTRUCTION: ICD-10-CM

## 2018-06-07 DIAGNOSIS — N13.8 BPH WITH URINARY OBSTRUCTION: ICD-10-CM

## 2018-06-07 DIAGNOSIS — E78.49 OTHER HYPERLIPIDEMIA: ICD-10-CM

## 2018-06-07 LAB — HBA1C MFR BLD: 6.8 %

## 2018-06-07 PROCEDURE — 1123F ACP DISCUSS/DSCN MKR DOCD: CPT | Performed by: INTERNAL MEDICINE

## 2018-06-07 PROCEDURE — G8427 DOCREV CUR MEDS BY ELIG CLIN: HCPCS | Performed by: INTERNAL MEDICINE

## 2018-06-07 PROCEDURE — G8417 CALC BMI ABV UP PARAM F/U: HCPCS | Performed by: INTERNAL MEDICINE

## 2018-06-07 PROCEDURE — 99214 OFFICE O/P EST MOD 30 MIN: CPT | Performed by: INTERNAL MEDICINE

## 2018-06-07 PROCEDURE — 4040F PNEUMOC VAC/ADMIN/RCVD: CPT | Performed by: INTERNAL MEDICINE

## 2018-06-07 PROCEDURE — 1036F TOBACCO NON-USER: CPT | Performed by: INTERNAL MEDICINE

## 2018-06-07 PROCEDURE — G8598 ASA/ANTIPLAT THER USED: HCPCS | Performed by: INTERNAL MEDICINE

## 2018-06-07 PROCEDURE — 83036 HEMOGLOBIN GLYCOSYLATED A1C: CPT | Performed by: INTERNAL MEDICINE

## 2018-06-21 ENCOUNTER — ANTI-COAG VISIT (OUTPATIENT)
Dept: PHARMACY | Age: 83
End: 2018-06-21

## 2018-06-21 DIAGNOSIS — I48.19 PERSISTENT ATRIAL FIBRILLATION (HCC): ICD-10-CM

## 2018-06-21 LAB
INR BLD: 2.2
PROTIME: 26.3 SECONDS

## 2018-07-01 ENCOUNTER — HOSPITAL ENCOUNTER (OUTPATIENT)
Dept: OTHER | Age: 83
Discharge: HOME | End: 2018-07-31
Attending: INTERNAL MEDICINE | Admitting: INTERNAL MEDICINE

## 2018-07-19 ENCOUNTER — ANTI-COAG VISIT (OUTPATIENT)
Dept: PHARMACY | Age: 83
End: 2018-07-19

## 2018-07-19 DIAGNOSIS — I48.19 PERSISTENT ATRIAL FIBRILLATION (HCC): ICD-10-CM

## 2018-07-19 LAB
INR BLD: 3
PROTIME: 35.8 SECONDS

## 2018-07-19 NOTE — PROGRESS NOTES
Mr. Tg Bains is a 80 y.o. y/o male with history of Afib   He presents today for anticoagulation monitoring and adjustment. Pertinent PMH: DM, CAD, HTN and Colon CA    Patient's wife  10/4/13. Patient Reported Findings:  Yes     No  [x]   []       Patient verifies current dosing regimen as listed  []   [x]       S/S bleeding/bruising/swelling/SOB  []   [x]       Blood in urine or stool  []   [x]       Procedures scheduled in the future at this time  []   [x]       Missed Dose   []   [x]       Extra Dose  []   [x]       Change in medications  []   [x]       Change in health/diet/appetite- Normally does not eat vegetables. []   [x]       Change in alcohol use   []   [x]       Change in activity  []   [x]       Hospital admission  []   [x]       Emergency department visit  []   [x]       Other complaints  Clinical Outcomes:  Yes     No  []   [x]       Major bleeding event  []   [x]       Thromboembolic event    Duration of warfarin Therapy: indefinite  INR Range:  2.0-3.0   PCP Dr. Ander Escobar    INR 3.0 today  Continue weekly dose of 7.5mg on Sun and 5mg (1 tablet) every day  Encouraged to maintain a consistency of vegetables/salads.   Recheck INR in 4 weeks,     Referring cardiologist is Dr. Sandhya Villareal  INR (no units)   Date Value   2018 3   2018 2.2   2018 3.1   2018 2.6

## 2018-08-01 ENCOUNTER — HOSPITAL ENCOUNTER (OUTPATIENT)
Dept: OTHER | Age: 83
Discharge: HOME | End: 2018-08-31
Attending: INTERNAL MEDICINE | Admitting: INTERNAL MEDICINE

## 2018-08-16 ENCOUNTER — ANTI-COAG VISIT (OUTPATIENT)
Dept: PHARMACY | Age: 83
End: 2018-08-16

## 2018-08-16 DIAGNOSIS — I48.19 PERSISTENT ATRIAL FIBRILLATION (HCC): ICD-10-CM

## 2018-08-16 LAB
INR BLD: 2.6
PROTIME: 31 SECONDS

## 2018-09-01 ENCOUNTER — HOSPITAL ENCOUNTER (OUTPATIENT)
Dept: OTHER | Age: 83
Discharge: HOME | End: 2018-09-01
Attending: INTERNAL MEDICINE | Admitting: INTERNAL MEDICINE

## 2018-09-10 ENCOUNTER — TELEPHONE (OUTPATIENT)
Dept: INTERNAL MEDICINE CLINIC | Age: 83
End: 2018-09-10

## 2018-09-10 RX ORDER — TAMSULOSIN HYDROCHLORIDE 0.4 MG/1
0.4 CAPSULE ORAL DAILY
Qty: 90 CAPSULE | Refills: 3 | Status: SHIPPED | OUTPATIENT
Start: 2018-09-10 | End: 2019-06-03 | Stop reason: SDUPTHER

## 2018-09-13 ENCOUNTER — ANTI-COAG VISIT (OUTPATIENT)
Dept: PHARMACY | Age: 83
End: 2018-09-13

## 2018-09-13 DIAGNOSIS — I48.19 PERSISTENT ATRIAL FIBRILLATION (HCC): ICD-10-CM

## 2018-09-13 LAB
INR BLD: 2.6
PROTIME: 31 SECONDS

## 2018-10-11 ENCOUNTER — ANTI-COAG VISIT (OUTPATIENT)
Dept: PHARMACY | Age: 83
End: 2018-10-11
Payer: MEDICARE

## 2018-10-11 DIAGNOSIS — I48.19 PERSISTENT ATRIAL FIBRILLATION (HCC): ICD-10-CM

## 2018-10-11 LAB — INTERNATIONAL NORMALIZATION RATIO, POC: 2.1

## 2018-10-11 PROCEDURE — 99211 OFF/OP EST MAY X REQ PHY/QHP: CPT

## 2018-10-11 PROCEDURE — 85610 PROTHROMBIN TIME: CPT

## 2018-10-23 ENCOUNTER — HOSPITAL ENCOUNTER (OUTPATIENT)
Age: 83
Discharge: HOME | End: 2018-10-23

## 2018-10-23 PROCEDURE — 9900000038 HC CARDIAC REHAB PHASE 3 - MONTHLY

## 2018-10-29 ENCOUNTER — OFFICE VISIT (OUTPATIENT)
Dept: CARDIOLOGY CLINIC | Age: 83
End: 2018-10-29
Payer: MEDICARE

## 2018-10-29 VITALS
SYSTOLIC BLOOD PRESSURE: 124 MMHG | BODY MASS INDEX: 31.08 KG/M2 | DIASTOLIC BLOOD PRESSURE: 72 MMHG | HEIGHT: 70 IN | OXYGEN SATURATION: 96 % | WEIGHT: 217.12 LBS | HEART RATE: 70 BPM

## 2018-10-29 DIAGNOSIS — I25.10 CORONARY ARTERY DISEASE INVOLVING NATIVE HEART WITHOUT ANGINA PECTORIS, UNSPECIFIED VESSEL OR LESION TYPE: Primary | ICD-10-CM

## 2018-10-29 DIAGNOSIS — I48.19 PERSISTENT ATRIAL FIBRILLATION (HCC): ICD-10-CM

## 2018-10-29 DIAGNOSIS — E78.49 OTHER HYPERLIPIDEMIA: ICD-10-CM

## 2018-10-29 DIAGNOSIS — I10 ESSENTIAL HYPERTENSION: ICD-10-CM

## 2018-10-29 PROCEDURE — G8417 CALC BMI ABV UP PARAM F/U: HCPCS | Performed by: INTERNAL MEDICINE

## 2018-10-29 PROCEDURE — 1101F PT FALLS ASSESS-DOCD LE1/YR: CPT | Performed by: INTERNAL MEDICINE

## 2018-10-29 PROCEDURE — 93225 XTRNL ECG REC<48 HRS REC: CPT | Performed by: INTERNAL MEDICINE

## 2018-10-29 PROCEDURE — 1123F ACP DISCUSS/DSCN MKR DOCD: CPT | Performed by: INTERNAL MEDICINE

## 2018-10-29 PROCEDURE — G8427 DOCREV CUR MEDS BY ELIG CLIN: HCPCS | Performed by: INTERNAL MEDICINE

## 2018-10-29 PROCEDURE — 99214 OFFICE O/P EST MOD 30 MIN: CPT | Performed by: INTERNAL MEDICINE

## 2018-10-29 PROCEDURE — 1036F TOBACCO NON-USER: CPT | Performed by: INTERNAL MEDICINE

## 2018-10-29 PROCEDURE — G8484 FLU IMMUNIZE NO ADMIN: HCPCS | Performed by: INTERNAL MEDICINE

## 2018-10-29 PROCEDURE — 4040F PNEUMOC VAC/ADMIN/RCVD: CPT | Performed by: INTERNAL MEDICINE

## 2018-10-29 PROCEDURE — G8598 ASA/ANTIPLAT THER USED: HCPCS | Performed by: INTERNAL MEDICINE

## 2018-10-29 NOTE — PROGRESS NOTES
use drugs. Family History:  family history includes Heart Disease in his mother; High Blood Pressure in his father and mother; Stroke in his mother. Current Outpatient Prescriptions   Medication Sig Dispense Refill    bimatoprost (LUMIGAN) 0.01 % SOLN ophthalmic drops Place 1 drop into both eyes nightly      tamsulosin (FLOMAX) 0.4 MG capsule Take 1 capsule by mouth daily 90 capsule 3    diltiazem (CARTIA XT) 120 MG extended release capsule TAKE 1 CAPSULE EVERY DAY 90 capsule 3    glimepiride (AMARYL) 1 MG tablet TAKE 1 TABLET EVERY MORNING 90 tablet 3    metoprolol succinate (TOPROL XL) 100 MG extended release tablet TAKE 1/2 TABLET EVERY DAY 45 tablet 3    simvastatin (ZOCOR) 20 MG tablet Take 1 tablet by mouth nightly 90 tablet 3    Bimatoprost (LUMIGAN OP) Apply to eye nightly      glucose blood VI test strips (ONETOUCH VERIO) strip 1 each by In Vitro route See Admin Instructions Test 2-4 times a day E11.9 100 each 3    dorzolamide (TRUSOPT) 2 % ophthalmic solution Place 1 drop into both eyes 2 times daily       Lancets Thin MISC 2-4 Devices by Does not apply route daily 2-4 leilani daily 100 each 3    warfarin (COUMADIN) 5 MG tablet Take 7.5 mg (one and a half tablets) coumadin every evening until directed otherwise by Dr. Theador Nyhan (Patient taking differently: Take 5 mg by mouth See Admin Instructions. 7.5mg on Thu and 5mg all other days) 90 tablet 3    brimonidine-timolol (COMBIGAN) 0.2-0.5 % ophthalmic solution Place 1 drop into both eyes every 12 hours. No current facility-administered medications for this visit. Allergies:  Hydrochlorothiazide; Metformin and related; and Morphine     Review of Systems:   · Constitutional: there has been no unanticipated weight loss. There's been no change in energy level, sleep pattern, or activity level. · Eyes: No visual changes or diplopia. No scleral icterus. · ENT: No Headaches, hearing loss or vertigo.  No mouth sores or sore throat. · Cardiovascular: Reviewed in HPI  · Respiratory: No cough or wheezing, no sputum production. No hematemesis. · Gastrointestinal: No abdominal pain, appetite loss, blood in stools. No change in bowel or bladder habits. · Genitourinary: No dysuria, trouble voiding, or hematuria. · Musculoskeletal:  No gait disturbance, weakness. Right knee pain. · Integumentary: No rash or pruritis. · Neurological: No headache, diplopia, change in muscle strength, numbness or tingling. No change in gait, balance, coordination, mood, affect, memory, mentation, behavior. · Psychiatric: No anxiety, no depression. · Endocrine: No malaise, fatigue or temperature intolerance. No excessive thirst, fluid intake, or urination. No tremor. · Hematologic/Lymphatic: No abnormal bruising or bleeding, blood clots or swollen lymph nodes. · Allergic/Immunologic: No nasal congestion or hives. Physical Examination:    Vitals:    10/29/18 1311   BP: 124/72   Pulse: 70   SpO2: 96%      Weight 223 lbs  Constitutional and General Appearance: NAD, well nourished   Skin:good turgor,intact without lesions  HEENT: EOMI ,normal  Neck:no JVD  Respiratory:  · Normal excursion and expansion without use of accessory muscles  · Resp Auscultation: Normal breath sounds without dullness  Cardiovascular:  · The apical impulses not displaced  · Heart tones are crisp and normal  · Cervical veins are not engorged  · The carotid upstroke is normal in amplitude and contour without delay or bruit  · Normal heart tone, irregular rhythm. · Peripheral pulses are symmetrical and full  · There is no clubbing, cyanosis of the extremities.   · No edema  · Femoral Arteries: 2+ and equal  · Pedal Pulses: 2+ and equal   Abdomen:  · No masses or tenderness  · Liver/Spleen: No Abnormalities Noted  Neurological/Psychiatric:  · Alert and oriented in all spheres  · Moves all extremities well  · Exhibits normal gait balance and coordination  · No abnormalities of mood,

## 2018-11-06 PROCEDURE — 93227 XTRNL ECG REC<48 HR R&I: CPT | Performed by: INTERNAL MEDICINE

## 2018-11-08 ENCOUNTER — TELEPHONE (OUTPATIENT)
Dept: CARDIOLOGY CLINIC | Age: 83
End: 2018-11-08

## 2018-11-09 ENCOUNTER — TELEPHONE (OUTPATIENT)
Dept: CARDIOLOGY CLINIC | Age: 83
End: 2018-11-09

## 2018-11-15 ENCOUNTER — ANTI-COAG VISIT (OUTPATIENT)
Dept: PHARMACY | Age: 83
End: 2018-11-15
Payer: MEDICARE

## 2018-11-15 DIAGNOSIS — I48.19 PERSISTENT ATRIAL FIBRILLATION (HCC): ICD-10-CM

## 2018-11-15 LAB — INTERNATIONAL NORMALIZATION RATIO, POC: 1.7

## 2018-11-15 PROCEDURE — 85610 PROTHROMBIN TIME: CPT

## 2018-11-15 PROCEDURE — 99211 OFF/OP EST MAY X REQ PHY/QHP: CPT

## 2018-11-21 ENCOUNTER — HOSPITAL ENCOUNTER (OUTPATIENT)
Age: 83
Discharge: HOME | End: 2018-11-21

## 2018-11-21 PROCEDURE — 9900000038 HC CARDIAC REHAB PHASE 3 - MONTHLY

## 2018-12-06 ENCOUNTER — OFFICE VISIT (OUTPATIENT)
Dept: INTERNAL MEDICINE CLINIC | Age: 83
End: 2018-12-06
Payer: MEDICARE

## 2018-12-06 ENCOUNTER — ANTI-COAG VISIT (OUTPATIENT)
Dept: PHARMACY | Age: 83
End: 2018-12-06
Payer: MEDICARE

## 2018-12-06 VITALS
HEART RATE: 84 BPM | WEIGHT: 224 LBS | BODY MASS INDEX: 32.07 KG/M2 | SYSTOLIC BLOOD PRESSURE: 108 MMHG | HEIGHT: 70 IN | DIASTOLIC BLOOD PRESSURE: 70 MMHG

## 2018-12-06 DIAGNOSIS — E78.00 PURE HYPERCHOLESTEROLEMIA: ICD-10-CM

## 2018-12-06 DIAGNOSIS — N40.1 BPH WITH URINARY OBSTRUCTION: ICD-10-CM

## 2018-12-06 DIAGNOSIS — I10 ESSENTIAL HYPERTENSION: ICD-10-CM

## 2018-12-06 DIAGNOSIS — N13.8 BPH WITH URINARY OBSTRUCTION: ICD-10-CM

## 2018-12-06 DIAGNOSIS — E11.9 TYPE 2 DIABETES MELLITUS WITHOUT COMPLICATION, WITHOUT LONG-TERM CURRENT USE OF INSULIN (HCC): Primary | ICD-10-CM

## 2018-12-06 DIAGNOSIS — I48.19 PERSISTENT ATRIAL FIBRILLATION (HCC): ICD-10-CM

## 2018-12-06 LAB
HBA1C MFR BLD: 6.7 %
INTERNATIONAL NORMALIZATION RATIO, POC: 2.3

## 2018-12-06 PROCEDURE — G8427 DOCREV CUR MEDS BY ELIG CLIN: HCPCS | Performed by: INTERNAL MEDICINE

## 2018-12-06 PROCEDURE — 85610 PROTHROMBIN TIME: CPT

## 2018-12-06 PROCEDURE — 99214 OFFICE O/P EST MOD 30 MIN: CPT | Performed by: INTERNAL MEDICINE

## 2018-12-06 PROCEDURE — 99211 OFF/OP EST MAY X REQ PHY/QHP: CPT

## 2018-12-06 PROCEDURE — G8598 ASA/ANTIPLAT THER USED: HCPCS | Performed by: INTERNAL MEDICINE

## 2018-12-06 PROCEDURE — 1123F ACP DISCUSS/DSCN MKR DOCD: CPT | Performed by: INTERNAL MEDICINE

## 2018-12-06 PROCEDURE — 1101F PT FALLS ASSESS-DOCD LE1/YR: CPT | Performed by: INTERNAL MEDICINE

## 2018-12-06 PROCEDURE — 83036 HEMOGLOBIN GLYCOSYLATED A1C: CPT | Performed by: INTERNAL MEDICINE

## 2018-12-06 PROCEDURE — G8484 FLU IMMUNIZE NO ADMIN: HCPCS | Performed by: INTERNAL MEDICINE

## 2018-12-06 PROCEDURE — G8417 CALC BMI ABV UP PARAM F/U: HCPCS | Performed by: INTERNAL MEDICINE

## 2018-12-06 PROCEDURE — 4040F PNEUMOC VAC/ADMIN/RCVD: CPT | Performed by: INTERNAL MEDICINE

## 2018-12-06 PROCEDURE — 1036F TOBACCO NON-USER: CPT | Performed by: INTERNAL MEDICINE

## 2018-12-06 ASSESSMENT — PATIENT HEALTH QUESTIONNAIRE - PHQ9
SUM OF ALL RESPONSES TO PHQ9 QUESTIONS 1 & 2: 0
1. LITTLE INTEREST OR PLEASURE IN DOING THINGS: 0
SUM OF ALL RESPONSES TO PHQ QUESTIONS 1-9: 0
SUM OF ALL RESPONSES TO PHQ QUESTIONS 1-9: 0
2. FEELING DOWN, DEPRESSED OR HOPELESS: 0

## 2018-12-19 ENCOUNTER — HOSPITAL ENCOUNTER (OUTPATIENT)
Age: 83
Discharge: HOME | End: 2018-12-19

## 2018-12-19 PROCEDURE — 9900000038 HC CARDIAC REHAB PHASE 3 - MONTHLY

## 2019-01-03 ENCOUNTER — ANTI-COAG VISIT (OUTPATIENT)
Dept: PHARMACY | Age: 84
End: 2019-01-03
Payer: MEDICARE

## 2019-01-03 DIAGNOSIS — I48.19 PERSISTENT ATRIAL FIBRILLATION (HCC): ICD-10-CM

## 2019-01-03 LAB — INTERNATIONAL NORMALIZATION RATIO, POC: 3

## 2019-01-03 PROCEDURE — 99211 OFF/OP EST MAY X REQ PHY/QHP: CPT

## 2019-01-03 PROCEDURE — 85610 PROTHROMBIN TIME: CPT

## 2019-01-15 ENCOUNTER — HOSPITAL ENCOUNTER (OUTPATIENT)
Dept: VASCULAR LAB | Age: 84
Discharge: HOME | End: 2019-01-15
Payer: MEDICARE

## 2019-01-15 ENCOUNTER — OFFICE VISIT (OUTPATIENT)
Dept: VASCULAR SURGERY | Age: 84
End: 2019-01-15
Payer: MEDICARE

## 2019-01-15 VITALS
DIASTOLIC BLOOD PRESSURE: 66 MMHG | SYSTOLIC BLOOD PRESSURE: 142 MMHG | BODY MASS INDEX: 29.68 KG/M2 | WEIGHT: 212 LBS | HEIGHT: 71 IN

## 2019-01-15 DIAGNOSIS — I65.23 CAROTID ATHEROSCLEROSIS, BILATERAL: Primary | ICD-10-CM

## 2019-01-15 DIAGNOSIS — I65.23 CAROTID ATHEROSCLEROSIS, BILATERAL: ICD-10-CM

## 2019-01-15 PROCEDURE — G8427 DOCREV CUR MEDS BY ELIG CLIN: HCPCS | Performed by: SURGERY

## 2019-01-15 PROCEDURE — 99213 OFFICE O/P EST LOW 20 MIN: CPT | Performed by: SURGERY

## 2019-01-15 PROCEDURE — 1123F ACP DISCUSS/DSCN MKR DOCD: CPT | Performed by: SURGERY

## 2019-01-15 PROCEDURE — G8417 CALC BMI ABV UP PARAM F/U: HCPCS | Performed by: SURGERY

## 2019-01-15 PROCEDURE — 1101F PT FALLS ASSESS-DOCD LE1/YR: CPT | Performed by: SURGERY

## 2019-01-15 PROCEDURE — 93880 EXTRACRANIAL BILAT STUDY: CPT

## 2019-01-15 PROCEDURE — 4040F PNEUMOC VAC/ADMIN/RCVD: CPT | Performed by: SURGERY

## 2019-01-15 PROCEDURE — G8598 ASA/ANTIPLAT THER USED: HCPCS | Performed by: SURGERY

## 2019-01-15 PROCEDURE — G8484 FLU IMMUNIZE NO ADMIN: HCPCS | Performed by: SURGERY

## 2019-01-15 PROCEDURE — 1036F TOBACCO NON-USER: CPT | Performed by: SURGERY

## 2019-01-23 ENCOUNTER — HOSPITAL ENCOUNTER (OUTPATIENT)
Age: 84
Discharge: HOME | End: 2019-01-23

## 2019-01-23 PROCEDURE — 9900000038 HC CARDIAC REHAB PHASE 3 - MONTHLY

## 2019-01-31 ENCOUNTER — ANTI-COAG VISIT (OUTPATIENT)
Dept: PHARMACY | Age: 84
End: 2019-01-31
Payer: MEDICARE

## 2019-01-31 DIAGNOSIS — I48.19 PERSISTENT ATRIAL FIBRILLATION (HCC): ICD-10-CM

## 2019-01-31 LAB — INTERNATIONAL NORMALIZATION RATIO, POC: 3

## 2019-01-31 PROCEDURE — 99211 OFF/OP EST MAY X REQ PHY/QHP: CPT

## 2019-01-31 PROCEDURE — 85610 PROTHROMBIN TIME: CPT

## 2019-02-12 RX ORDER — GLIMEPIRIDE 1 MG/1
TABLET ORAL
Qty: 90 TABLET | Refills: 3 | Status: SHIPPED | OUTPATIENT
Start: 2019-02-12 | End: 2020-03-11

## 2019-02-12 RX ORDER — DILTIAZEM HYDROCHLORIDE 120 MG/1
CAPSULE, COATED, EXTENDED RELEASE ORAL
Qty: 90 CAPSULE | Refills: 3 | Status: SHIPPED | OUTPATIENT
Start: 2019-02-12 | End: 2020-03-11

## 2019-02-12 RX ORDER — SIMVASTATIN 20 MG
20 TABLET ORAL NIGHTLY
Qty: 90 TABLET | Refills: 3 | Status: SHIPPED | OUTPATIENT
Start: 2019-02-12 | End: 2020-03-11

## 2019-02-12 RX ORDER — METOPROLOL SUCCINATE 100 MG/1
TABLET, EXTENDED RELEASE ORAL
Qty: 45 TABLET | Refills: 3 | Status: SHIPPED | OUTPATIENT
Start: 2019-02-12 | End: 2020-03-11

## 2019-02-15 ENCOUNTER — TELEPHONE (OUTPATIENT)
Dept: INTERNAL MEDICINE CLINIC | Age: 84
End: 2019-02-15

## 2019-02-15 DIAGNOSIS — E11.9 TYPE 2 DIABETES MELLITUS WITHOUT COMPLICATION, WITHOUT LONG-TERM CURRENT USE OF INSULIN (HCC): Primary | ICD-10-CM

## 2019-02-18 ENCOUNTER — TELEPHONE (OUTPATIENT)
Dept: RHEUMATOLOGY | Age: 84
End: 2019-02-18

## 2019-02-27 ENCOUNTER — HOSPITAL ENCOUNTER (OUTPATIENT)
Age: 84
Discharge: HOME | End: 2019-02-27

## 2019-02-27 PROCEDURE — 9900000038 HC CARDIAC REHAB PHASE 3 - MONTHLY

## 2019-03-07 ENCOUNTER — ANTI-COAG VISIT (OUTPATIENT)
Dept: PHARMACY | Age: 84
End: 2019-03-07
Payer: MEDICARE

## 2019-03-07 DIAGNOSIS — I48.19 PERSISTENT ATRIAL FIBRILLATION (HCC): ICD-10-CM

## 2019-03-07 LAB — INTERNATIONAL NORMALIZATION RATIO, POC: 2.3

## 2019-03-07 PROCEDURE — 99211 OFF/OP EST MAY X REQ PHY/QHP: CPT

## 2019-03-07 PROCEDURE — 85610 PROTHROMBIN TIME: CPT

## 2019-03-27 ENCOUNTER — HOSPITAL ENCOUNTER (OUTPATIENT)
Age: 84
Discharge: HOME | End: 2019-03-27

## 2019-03-27 PROCEDURE — 9900000038 HC CARDIAC REHAB PHASE 3 - MONTHLY

## 2019-04-11 ENCOUNTER — ANTI-COAG VISIT (OUTPATIENT)
Dept: PHARMACY | Age: 84
End: 2019-04-11
Payer: MEDICARE

## 2019-04-11 DIAGNOSIS — I48.19 PERSISTENT ATRIAL FIBRILLATION (HCC): ICD-10-CM

## 2019-04-11 LAB — INTERNATIONAL NORMALIZATION RATIO, POC: 2

## 2019-04-11 PROCEDURE — 99211 OFF/OP EST MAY X REQ PHY/QHP: CPT

## 2019-04-11 PROCEDURE — 85610 PROTHROMBIN TIME: CPT

## 2019-04-11 NOTE — PROGRESS NOTES
Mr. Lisy Ochoa is a 80 y.o. y/o male with history of Afib   He presents today for anticoagulation monitoring and adjustment. Pertinent PMH: DM, CAD, HTN and Colon CA    Patient's wife  10/4/13. Patient Reported Findings:  Yes     No  [x]   []       Patient verifies current dosing regimen as listed  []   [x]       S/S bleeding/bruising/swelling/SOB -   []   [x]       Blood in urine or stool  []   [x]       Procedures scheduled in the future at this time   []   [x]       Missed Dose    []   [x]       Extra Dose  []   [x]       Change in medications - takes tylenol as needed   []   [x]       Change in health/diet/appetite-Has  Vegetables 1-2 x weekly. 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 Clinical Outcomes:  Yes     No  []   [x]       Major bleeding event  []   [x]       Thromboembolic event    Duration of warfarin Therapy: indefinite  INR Range:  2.0-3.0  PCP Dr. Millie Calles    INR 2.0 today  Continue same weekly dose of 7.5mg on Sun and 5mg (1 tablet) every day. Encouraged to maintain a consistency of vegetables/salads.    Recheck INR in 4 weeks on     Referring cardiologist is Dr. Bridget Enriquez  INR (no units)   Date Value   2019 2.0   2019 2.3   2019 3.0   2019 3   2018 2.6   2018 2.6   2018 3   2018 2.2

## 2019-04-24 ENCOUNTER — HOSPITAL ENCOUNTER (OUTPATIENT)
Age: 84
Discharge: HOME | End: 2019-04-24

## 2019-04-24 PROCEDURE — 9900000038 HC CARDIAC REHAB PHASE 3 - MONTHLY

## 2019-05-06 ENCOUNTER — OFFICE VISIT (OUTPATIENT)
Dept: CARDIOLOGY CLINIC | Age: 84
End: 2019-05-06
Payer: MEDICARE

## 2019-05-06 VITALS
BODY MASS INDEX: 31.92 KG/M2 | DIASTOLIC BLOOD PRESSURE: 64 MMHG | SYSTOLIC BLOOD PRESSURE: 114 MMHG | HEIGHT: 70 IN | WEIGHT: 223 LBS | HEART RATE: 80 BPM

## 2019-05-06 DIAGNOSIS — I25.10 CORONARY ARTERY DISEASE INVOLVING NATIVE HEART WITHOUT ANGINA PECTORIS, UNSPECIFIED VESSEL OR LESION TYPE: ICD-10-CM

## 2019-05-06 DIAGNOSIS — I77.9 BILATERAL CAROTID ARTERY DISEASE, UNSPECIFIED TYPE (HCC): ICD-10-CM

## 2019-05-06 DIAGNOSIS — I65.23 BILATERAL CAROTID ARTERY STENOSIS: ICD-10-CM

## 2019-05-06 DIAGNOSIS — I10 ESSENTIAL HYPERTENSION: ICD-10-CM

## 2019-05-06 DIAGNOSIS — E11.9 TYPE 2 DIABETES MELLITUS WITHOUT COMPLICATION, WITHOUT LONG-TERM CURRENT USE OF INSULIN (HCC): ICD-10-CM

## 2019-05-06 DIAGNOSIS — I48.19 PERSISTENT ATRIAL FIBRILLATION (HCC): Primary | ICD-10-CM

## 2019-05-06 PROCEDURE — 4040F PNEUMOC VAC/ADMIN/RCVD: CPT | Performed by: INTERNAL MEDICINE

## 2019-05-06 PROCEDURE — G8417 CALC BMI ABV UP PARAM F/U: HCPCS | Performed by: INTERNAL MEDICINE

## 2019-05-06 PROCEDURE — 1036F TOBACCO NON-USER: CPT | Performed by: INTERNAL MEDICINE

## 2019-05-06 PROCEDURE — 1123F ACP DISCUSS/DSCN MKR DOCD: CPT | Performed by: INTERNAL MEDICINE

## 2019-05-06 PROCEDURE — G8598 ASA/ANTIPLAT THER USED: HCPCS | Performed by: INTERNAL MEDICINE

## 2019-05-06 PROCEDURE — 99214 OFFICE O/P EST MOD 30 MIN: CPT | Performed by: INTERNAL MEDICINE

## 2019-05-06 PROCEDURE — G8427 DOCREV CUR MEDS BY ELIG CLIN: HCPCS | Performed by: INTERNAL MEDICINE

## 2019-05-06 NOTE — PROGRESS NOTES
Stroke in his mother. Current Outpatient Medications   Medication Sig Dispense Refill    blood glucose test strips (ONETOUCH VERIO) strip 1 each by In Vitro route 3 times daily Test 2-4 times a day E11.9 100 each 1    diltiazem (CARTIA XT) 120 MG extended release capsule TAKE 1 CAPSULE EVERY DAY 90 capsule 3    metoprolol succinate (TOPROL XL) 100 MG extended release tablet TAKE 1/2 TABLET EVERY DAY 45 tablet 3    glimepiride (AMARYL) 1 MG tablet TAKE 1 TABLET EVERY MORNING 90 tablet 3    simvastatin (ZOCOR) 20 MG tablet Take 1 tablet by mouth nightly 90 tablet 3    bimatoprost (LUMIGAN) 0.01 % SOLN ophthalmic drops Place 1 drop into both eyes nightly      tamsulosin (FLOMAX) 0.4 MG capsule Take 1 capsule by mouth daily 90 capsule 3    dorzolamide (TRUSOPT) 2 % ophthalmic solution Place 1 drop into both eyes 2 times daily       Lancets Thin MISC 2-4 Devices by Does not apply route daily 2-4 leilani daily 100 each 3    warfarin (COUMADIN) 5 MG tablet Take 7.5 mg (one and a half tablets) coumadin every evening until directed otherwise by Dr. Rodrick Patel (Patient taking differently: Take 5 mg by mouth See Admin Instructions. 7.5mg on Thu and 5mg all other days) 90 tablet 3    brimonidine-timolol (COMBIGAN) 0.2-0.5 % ophthalmic solution Place 1 drop into both eyes every 12 hours. No current facility-administered medications for this visit. Allergies:  Hydrochlorothiazide; Metformin and related; and Morphine     Review of Systems:   · Constitutional: there has been no unanticipated weight loss. There's been no change in energy level, sleep pattern, or activity level. · Eyes: decreased vision-macular degeneration   · ENT: No Headaches, hearing loss or vertigo. No mouth sores or sore throat. · Cardiovascular: Reviewed in HPI  · Respiratory: No cough or wheezing, no sputum production. No hematemesis. · Gastrointestinal: No abdominal pain, appetite loss, blood in stools.  No change in bowel or bladder habits. · Genitourinary: No dysuria, trouble voiding, or hematuria. · Musculoskeletal:  No gait disturbance, weakness. Right knee pain. · Integumentary: No rash or pruritis. · Neurological: No headache, diplopia, change in muscle strength, numbness or tingling. No change in gait, balance, coordination, mood, affect, memory, mentation, behavior. · Psychiatric: No anxiety, no depression. · Endocrine: No malaise, fatigue or temperature intolerance. No excessive thirst, fluid intake, or urination. No tremor. · Hematologic/Lymphatic: No abnormal bruising or bleeding, blood clots or swollen lymph nodes. · Allergic/Immunologic: No nasal congestion or hives. Physical Examination:    Vitals:    05/06/19 0941   BP: 114/64   Pulse: 80      Weight 223 lbs  Constitutional and General Appearance: NAD, well nourished   Skin:good turgor,intact without lesions  HEENT: EOMI ,normal  Neck:no JVD  Respiratory:  · Normal excursion and expansion without use of accessory muscles  · Resp Auscultation: Normal breath sounds without dullness  Cardiovascular:  · The apical impulses not displaced  · Heart tones are crisp and normal  · Cervical veins are not engorged  · The carotid upstroke is normal in amplitude and contour without delay or bruit  · Normal heart tone, irregular rhythm. · Peripheral pulses are symmetrical and full  · There is no clubbing, cyanosis of the extremities. · No edema  · Femoral Arteries: 2+ and equal  · Pedal Pulses: 2+ and equal   Abdomen:  · No masses or tenderness  · Liver/Spleen: No Abnormalities Noted  Neurological/Psychiatric:  · Alert and oriented in all spheres  · Moves all extremities well  · Exhibits normal gait balance and coordination  · No abnormalities of mood, affect, memory, mentation, or behavior are noted    Assessment/Plan:    1. Atrial fibrillation: Well controlled. Irregular. Remains on Coumadin. INR managed by [facility] clinic. 2. CAD (coronary artery disease): Stable.  No anginal symptoms. 12/05 cath> JURGEN to LAD. 3. Hypertension:  Stable. Blood pressure 114/64, pulse 80, height 5' 10\" (1.778 m), weight 223 lb (101.2 kg). 4. Other and unspecified hyperlipidemia: Checked by PCP.    5/18> hdl 42 ldl 36   5. Carotid artery disease: s/p left CEA 2/2014. Managed by Dr. Lili Nunez. 12/18/15 Dopplers> 1-15% ja. Currently on Zocor. 6.  DM w/o complication type II: Followed by PCP. 12/18 HgA1c 6.7    7. Retinal artery occlusion: Follows with specialist Dr. Claude Guidry  8. Osteoarthritis of knee: 12/10 right knee replacement. Right knee pain followed by Dr. Tyrese Vora. He has had 2 surgeries on his knee. Stable. Plan:   Ab Nick has a stable cardiac status. Lab results reviewed this visit and discussed. No med changes. Continue risk factor modifications. Call for any change in symptoms. Return for regular follow up in 6 months. I appreciate the opportunity of cooperating in the care of this individual.    Marzella Homans. Heath Torres M.D., [address] attestation: This note was scribed in the presence of Dr. Heath Torres MD, by Dewey De Luna RN. The scribe's documentation has been prepared under my direction and personally reviewed by me in its entirety. I confirm that the note above accurately reflects all work, treatment, procedures, and medical decision making performed by me.

## 2019-05-14 ENCOUNTER — ANTI-COAG VISIT (OUTPATIENT)
Dept: PHARMACY | Age: 84
End: 2019-05-14
Payer: MEDICARE

## 2019-05-14 DIAGNOSIS — I48.19 PERSISTENT ATRIAL FIBRILLATION (HCC): ICD-10-CM

## 2019-05-14 LAB — INTERNATIONAL NORMALIZATION RATIO, POC: 2

## 2019-05-14 PROCEDURE — 85610 PROTHROMBIN TIME: CPT

## 2019-05-14 PROCEDURE — 99211 OFF/OP EST MAY X REQ PHY/QHP: CPT

## 2019-05-14 NOTE — PROGRESS NOTES
Mr. Tyler Abbott is a 80 y.o. y/o male with history of Afib   He presents today for anticoagulation monitoring and adjustment. Pertinent PMH: DM, CAD, HTN and Colon CA    Patient's wife  10/4/13. Patient Reported Findings:  Yes     No  [x]   []       Patient verifies current dosing regimen as listed  []   [x]       S/S bleeding/bruising/swelling/SOB -   []   [x]       Blood in urine or stool  []   [x]       Procedures scheduled in the future at this time   []   [x]       Missed Dose    []   [x]       Extra Dose  []   [x]       Change in medications - takes tylenol as needed   []   [x]       Change in health/diet/appetite-Has  Vegetables 1-2 x weekly. 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 Clinical Outcomes:  Yes     No  []   [x]       Major bleeding event  []   [x]       Thromboembolic event    Duration of warfarin Therapy: indefinite  INR Range:  2.0-3.0  PCP Dr. Héctor Tse    INR 2.0 again today  Continue same weekly dose of 7.5mg on Sun and 5mg (1 tablet) every day. Encouraged to maintain a consistency of vegetables/salads.    Recheck INR in 4 weeks on     Referring cardiologist is Dr. Patrick Coleman  INR (no units)   Date Value   2019 2.0   2019 2.0   2019 2.3   2019 3.0   2018 2.6   2018 2.6   2018 3   2018 2.2

## 2019-05-31 ENCOUNTER — HOSPITAL ENCOUNTER (OUTPATIENT)
Age: 84
Discharge: HOME | End: 2019-05-31

## 2019-05-31 PROCEDURE — 9900000038 HC CARDIAC REHAB PHASE 3 - MONTHLY

## 2019-06-03 ENCOUNTER — OFFICE VISIT (OUTPATIENT)
Dept: INTERNAL MEDICINE CLINIC | Age: 84
End: 2019-06-03
Payer: MEDICARE

## 2019-06-03 VITALS
HEART RATE: 84 BPM | SYSTOLIC BLOOD PRESSURE: 112 MMHG | HEIGHT: 70 IN | DIASTOLIC BLOOD PRESSURE: 72 MMHG | BODY MASS INDEX: 31.78 KG/M2 | WEIGHT: 222 LBS

## 2019-06-03 DIAGNOSIS — E11.9 TYPE 2 DIABETES MELLITUS WITHOUT COMPLICATION, WITHOUT LONG-TERM CURRENT USE OF INSULIN (HCC): Primary | ICD-10-CM

## 2019-06-03 DIAGNOSIS — N40.1 BPH WITH URINARY OBSTRUCTION: ICD-10-CM

## 2019-06-03 DIAGNOSIS — I10 ESSENTIAL HYPERTENSION: ICD-10-CM

## 2019-06-03 DIAGNOSIS — M25.562 CHRONIC PAIN OF LEFT KNEE: ICD-10-CM

## 2019-06-03 DIAGNOSIS — G89.29 CHRONIC PAIN OF LEFT KNEE: ICD-10-CM

## 2019-06-03 DIAGNOSIS — E78.00 PURE HYPERCHOLESTEROLEMIA: ICD-10-CM

## 2019-06-03 DIAGNOSIS — N13.8 BPH WITH URINARY OBSTRUCTION: ICD-10-CM

## 2019-06-03 LAB — HBA1C MFR BLD: 6.7 %

## 2019-06-03 PROCEDURE — G8417 CALC BMI ABV UP PARAM F/U: HCPCS | Performed by: INTERNAL MEDICINE

## 2019-06-03 PROCEDURE — 83036 HEMOGLOBIN GLYCOSYLATED A1C: CPT | Performed by: INTERNAL MEDICINE

## 2019-06-03 PROCEDURE — 4040F PNEUMOC VAC/ADMIN/RCVD: CPT | Performed by: INTERNAL MEDICINE

## 2019-06-03 PROCEDURE — G8598 ASA/ANTIPLAT THER USED: HCPCS | Performed by: INTERNAL MEDICINE

## 2019-06-03 PROCEDURE — 1123F ACP DISCUSS/DSCN MKR DOCD: CPT | Performed by: INTERNAL MEDICINE

## 2019-06-03 PROCEDURE — 99214 OFFICE O/P EST MOD 30 MIN: CPT | Performed by: INTERNAL MEDICINE

## 2019-06-03 PROCEDURE — 1036F TOBACCO NON-USER: CPT | Performed by: INTERNAL MEDICINE

## 2019-06-03 PROCEDURE — G8427 DOCREV CUR MEDS BY ELIG CLIN: HCPCS | Performed by: INTERNAL MEDICINE

## 2019-06-03 RX ORDER — LANCETS
2-4 EACH MISCELLANEOUS DAILY
Qty: 100 EACH | Refills: 3 | Status: SHIPPED | OUTPATIENT
Start: 2019-06-03 | End: 2019-12-02 | Stop reason: SDUPTHER

## 2019-06-03 RX ORDER — TAMSULOSIN HYDROCHLORIDE 0.4 MG/1
0.4 CAPSULE ORAL DAILY
Qty: 90 CAPSULE | Refills: 3 | Status: SHIPPED | OUTPATIENT
Start: 2019-06-03 | End: 2020-08-18 | Stop reason: SDUPTHER

## 2019-06-03 ASSESSMENT — PATIENT HEALTH QUESTIONNAIRE - PHQ9
SUM OF ALL RESPONSES TO PHQ QUESTIONS 1-9: 0
SUM OF ALL RESPONSES TO PHQ9 QUESTIONS 1 & 2: 0
SUM OF ALL RESPONSES TO PHQ QUESTIONS 1-9: 0
1. LITTLE INTEREST OR PLEASURE IN DOING THINGS: 0
2. FEELING DOWN, DEPRESSED OR HOPELESS: 0

## 2019-06-03 NOTE — PROGRESS NOTES
Chief Complaint   Patient presents with    Diabetes     Glucose this morning was 114.  Hypertension    Hyperlipidemia    Benign Prostatic Hypertrophy    Knee Pain     Pt c/o left intermittent, shooting knee pain for sometimes. aggravated by certain positions, walking. Had an injection about 5 yrs ago. HPI:  T2DM: he reports excellent glycemic control at home. He denies hypoglycemia. He takes 1mg of glimepiride as prescribed. HTN: The patient is tolerating blood pressure medication well and taking them as directed. BP control outside of the office is well controlled. No symptoms concerning for end organ damage are present. HLD: He takes simvastatin as directed. He denies side effects. BPH: he is using Flomax. He has 2 episodes of nocturnal urination. [address]  Sp Right TKA and revision        Social History     Tobacco Use    Smoking status: Former Smoker     Packs/day: 1.00     Years: 30.00     Pack years: 30.00     Last attempt to quit: 1976     Years since quittin.8    Smokeless tobacco: Never Used   Substance Use Topics    Alcohol use: Yes     Alcohol/week: 6.0 oz     Types: 10 Cans of beer per week    Drug use: No        ROS:  CV: Neg for chest pain  RESP: neg for dyspnea   GI: occasional constipation  MSK: intermittent left knee pain, chronic      EXAM:  /72 (Site: Left Upper Arm, Position: Sitting, Cuff Size: Large Adult)   Pulse 84   Ht 5' 10\" (1.778 m)   Wt 222 lb (100.7 kg)   BMI 31.85 kg/m²    GEN: WN/WD, NAD  CV: irregular rhythm, normal rate  Resp: normal effort, clear auscultation bilaterally  Abd: soft, nontender to palpation.    Trace BLE pitting edema      Lab Results   Component Value Date    CREATININE 0.6 (L) 2018    BUN 14 2018     2018    K 4.9 2018     2018    CO2 27 2018     Lab Results   Component Value Date    CHOL 111 2018    CHOL 115 2017    CHOL 116 02/10/2017     Lab Results Component Value Date    TRIG 165 (H) 05/30/2018    TRIG 88 08/07/2017    TRIG 85 02/10/2017     Lab Results   Component Value Date    HDL 42 05/30/2018    HDL 47 08/07/2017    HDL 46 02/10/2017     Lab Results   Component Value Date    LDLCALC 36 05/30/2018    LDLCALC 50 08/07/2017    LDLCALC 53 02/10/2017     Lab Results   Component Value Date    LABVLDL 33 05/30/2018    LABVLDL 18 08/07/2017    LABVLDL 17 02/10/2017     No results found for: [facility]   Lab Results   Component Value Date    LABA1C 6.7 12/06/2018     Lab Results   Component Value Date    .0 08/07/2017        A/P  1. Type 2 diabetes mellitus without complication, without long-term current use of insulin (HCC)  Excellent glycemic control. We discussed the importance of avoidance of hypoglycemia. There has not been evidence of hypoglycemia. Continue glimepiride.  - blood glucose test strips (ONETOUCH VERIO) strip; 1 each by In Vitro route 3 times daily Test 2-4 times a day E11.9  Dispense: 100 each; Refill: 3  - POCT glycosylated hemoglobin (Hb A1C)    2. Essential hypertension  Well-controlled. Blood work today. Continue current medications. - Renal Function Panel    3. Pure hypercholesterolemia  Stable. Continue simvastatin. - Lipid Panel    4. BPH with urinary obstruction  Stable. Continue Flomax. 5. Chronic pain of left knee  Suspect osteoarthritis. At this time he would like to continue with conservative management. If symptoms progress I will plan to obtain plain films. I provided him with an order today so he can obtain this study if necessary. Consider Kenalog injection if symptoms progress pending x-ray results. - XR KNEE LEFT (3 VIEWS);  Future       RTO 6 months

## 2019-06-04 ENCOUNTER — ANTI-COAG VISIT (OUTPATIENT)
Dept: PHARMACY | Age: 84
End: 2019-06-04
Payer: MEDICARE

## 2019-06-04 DIAGNOSIS — I48.19 PERSISTENT ATRIAL FIBRILLATION (HCC): ICD-10-CM

## 2019-06-04 LAB
ALBUMIN SERPL-MCNC: 4.4 G/DL (ref 3.4–5)
ANION GAP SERPL CALCULATED.3IONS-SCNC: 13 MMOL/L (ref 3–16)
BUN BLDV-MCNC: 15 MG/DL (ref 7–20)
CALCIUM SERPL-MCNC: 9.7 MG/DL (ref 8.3–10.6)
CHLORIDE BLD-SCNC: 98 MMOL/L (ref 99–110)
CHOLESTEROL, TOTAL: 96 MG/DL (ref 0–199)
CO2: 27 MMOL/L (ref 21–32)
CREAT SERPL-MCNC: 0.8 MG/DL (ref 0.8–1.3)
GFR AFRICAN AMERICAN: >60
GFR NON-AFRICAN AMERICAN: >60
GLUCOSE BLD-MCNC: 147 MG/DL (ref 70–99)
HDLC SERPL-MCNC: 38 MG/DL (ref 40–60)
INTERNATIONAL NORMALIZATION RATIO, POC: 2.1
LDL CHOLESTEROL CALCULATED: 35 MG/DL
PHOSPHORUS: 3 MG/DL (ref 2.5–4.9)
POTASSIUM SERPL-SCNC: 4.4 MMOL/L (ref 3.5–5.1)
SODIUM BLD-SCNC: 138 MMOL/L (ref 136–145)
TRIGL SERPL-MCNC: 117 MG/DL (ref 0–150)
VLDLC SERPL CALC-MCNC: 23 MG/DL

## 2019-06-04 PROCEDURE — 85610 PROTHROMBIN TIME: CPT

## 2019-06-04 PROCEDURE — 99211 OFF/OP EST MAY X REQ PHY/QHP: CPT

## 2019-06-04 NOTE — PROGRESS NOTES
Mr. Tyler Abbott is a 80 y.o. y/o male with history of Afib   He presents today for anticoagulation monitoring and adjustment. Pertinent PMH: DM, CAD, HTN and Colon CA    Patient's wife  10/4/13. Patient Reported Findings:  Yes     No  [x]   []       Patient verifies current dosing regimen as listed  []   [x]       S/S bleeding/bruising/swelling/SOB -   []   [x]       Blood in urine or stool  []   [x]       Procedures scheduled in the future at this time   []   [x]       Missed Dose    []   [x]       Extra Dose  []   [x]       Change in medications - takes tylenol as needed   []   [x]       Change in health/diet/appetite-Has  Vegetables 1-2 x weekly. 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 Clinical Outcomes:  Yes     No  []   [x]       Major bleeding event  []   [x]       Thromboembolic event    Duration of warfarin Therapy: indefinite  INR Range:  2.0-3.0    INR 2.1 today  Increase weekly dose to 7.5mg on Sun & Wed and 5mg (1 tablet) every day. Encouraged to maintain a consistency of vegetables/salads.    Recheck INR in 4 weeks on     Referring cardiologist is Dr. Patrick Coleman  PCP Dr. Héctor Tse  INR (no units)   Date Value   2019 2.1   2019 2.0   2019 2.0   2019 2.3   2018 2.6   2018 2.6   2018 3   2018 2.2

## 2019-06-26 ENCOUNTER — HOSPITAL ENCOUNTER (OUTPATIENT)
Age: 84
Discharge: HOME | End: 2019-06-26

## 2019-06-26 PROCEDURE — 9900000038 HC CARDIAC REHAB PHASE 3 - MONTHLY

## 2019-07-02 ENCOUNTER — ANTI-COAG VISIT (OUTPATIENT)
Dept: PHARMACY | Age: 84
End: 2019-07-02
Payer: MEDICARE

## 2019-07-02 DIAGNOSIS — I48.19 PERSISTENT ATRIAL FIBRILLATION (HCC): ICD-10-CM

## 2019-07-02 LAB — INTERNATIONAL NORMALIZATION RATIO, POC: 1.9

## 2019-07-02 PROCEDURE — 85610 PROTHROMBIN TIME: CPT

## 2019-07-02 PROCEDURE — 99211 OFF/OP EST MAY X REQ PHY/QHP: CPT

## 2019-07-23 ENCOUNTER — ANTI-COAG VISIT (OUTPATIENT)
Dept: PHARMACY | Age: 84
End: 2019-07-23
Payer: MEDICARE

## 2019-07-23 DIAGNOSIS — I48.91 ATRIAL FIBRILLATION, UNSPECIFIED TYPE (HCC): ICD-10-CM

## 2019-07-23 LAB — INTERNATIONAL NORMALIZATION RATIO, POC: 2.2

## 2019-07-23 PROCEDURE — 99211 OFF/OP EST MAY X REQ PHY/QHP: CPT

## 2019-07-23 PROCEDURE — 85610 PROTHROMBIN TIME: CPT

## 2019-07-24 PROCEDURE — 9900000038 HC CARDIAC REHAB PHASE 3 - MONTHLY

## 2019-07-29 ENCOUNTER — HOSPITAL ENCOUNTER (OUTPATIENT)
Dept: CARDIAC REHAB | Age: 84
Discharge: HOME | End: 2019-07-29

## 2019-08-20 ENCOUNTER — TELEPHONE (OUTPATIENT)
Dept: PHARMACY | Age: 84
End: 2019-08-20

## 2019-08-20 ENCOUNTER — APPOINTMENT (OUTPATIENT)
Dept: PHARMACY | Age: 84
End: 2019-08-20
Payer: MEDICARE

## 2019-08-22 ENCOUNTER — ANTI-COAG VISIT (OUTPATIENT)
Dept: PHARMACY | Age: 84
End: 2019-08-22
Payer: MEDICARE

## 2019-08-22 DIAGNOSIS — I48.91 ATRIAL FIBRILLATION, UNSPECIFIED TYPE (HCC): ICD-10-CM

## 2019-08-22 LAB — INTERNATIONAL NORMALIZATION RATIO, POC: 2.5

## 2019-08-22 PROCEDURE — 85610 PROTHROMBIN TIME: CPT

## 2019-08-22 PROCEDURE — 99211 OFF/OP EST MAY X REQ PHY/QHP: CPT

## 2019-08-26 ENCOUNTER — HOSPITAL ENCOUNTER (OUTPATIENT)
Dept: CARDIAC REHAB | Age: 84
Discharge: HOME | End: 2019-08-26

## 2019-08-26 PROCEDURE — 9900000038 HC CARDIAC REHAB PHASE 3 - MONTHLY

## 2019-09-25 PROCEDURE — 9900000038 HC CARDIAC REHAB PHASE 3 - MONTHLY

## 2019-09-30 ENCOUNTER — HOSPITAL ENCOUNTER (OUTPATIENT)
Dept: CARDIAC REHAB | Age: 84
Discharge: HOME | End: 2019-09-30

## 2019-10-01 ENCOUNTER — TELEPHONE (OUTPATIENT)
Dept: PHARMACY | Age: 84
End: 2019-10-01

## 2019-10-03 ENCOUNTER — ANTI-COAG VISIT (OUTPATIENT)
Dept: PHARMACY | Age: 84
End: 2019-10-03
Payer: MEDICARE

## 2019-10-03 DIAGNOSIS — I48.91 ATRIAL FIBRILLATION, UNSPECIFIED TYPE (HCC): ICD-10-CM

## 2019-10-03 LAB — INTERNATIONAL NORMALIZATION RATIO, POC: 2.9

## 2019-10-03 PROCEDURE — 85610 PROTHROMBIN TIME: CPT

## 2019-10-03 PROCEDURE — 99211 OFF/OP EST MAY X REQ PHY/QHP: CPT

## 2019-10-28 ENCOUNTER — HOSPITAL ENCOUNTER (OUTPATIENT)
Dept: CARDIAC REHAB | Age: 84
Discharge: HOME | End: 2019-10-28

## 2019-10-28 PROCEDURE — 9900000038 HC CARDIAC REHAB PHASE 3 - MONTHLY

## 2019-11-01 ENCOUNTER — APPOINTMENT (OUTPATIENT)
Dept: CT IMAGING | Age: 84
End: 2019-11-01
Payer: MEDICARE

## 2019-11-01 ENCOUNTER — APPOINTMENT (OUTPATIENT)
Dept: GENERAL RADIOLOGY | Age: 84
End: 2019-11-01
Payer: MEDICARE

## 2019-11-01 ENCOUNTER — HOSPITAL ENCOUNTER (EMERGENCY)
Age: 84
Discharge: HOME | End: 2019-11-01
Attending: EMERGENCY MEDICINE
Payer: MEDICARE

## 2019-11-01 VITALS
HEIGHT: 71 IN | SYSTOLIC BLOOD PRESSURE: 150 MMHG | RESPIRATION RATE: 14 BRPM | DIASTOLIC BLOOD PRESSURE: 65 MMHG | HEART RATE: 96 BPM | OXYGEN SATURATION: 92 % | WEIGHT: 222 LBS | BODY MASS INDEX: 31.08 KG/M2 | TEMPERATURE: 98.1 F

## 2019-11-01 DIAGNOSIS — R42 DIZZINESS: Primary | ICD-10-CM

## 2019-11-01 LAB
A/G RATIO: 1.2 (ref 1.1–2.2)
ALBUMIN SERPL-MCNC: 4.6 G/DL (ref 3.4–5)
ALP BLD-CCNC: 91 U/L (ref 40–129)
ALT SERPL-CCNC: 16 U/L (ref 10–40)
ANION GAP SERPL CALCULATED.3IONS-SCNC: 9 MMOL/L (ref 3–16)
AST SERPL-CCNC: 21 U/L (ref 15–37)
BASOPHILS ABSOLUTE: 0 K/UL (ref 0–0.2)
BASOPHILS RELATIVE PERCENT: 0.7 %
BILIRUB SERPL-MCNC: 0.8 MG/DL (ref 0–1)
BUN BLDV-MCNC: 11 MG/DL (ref 7–20)
CALCIUM SERPL-MCNC: 10.4 MG/DL (ref 8.3–10.6)
CHLORIDE BLD-SCNC: 102 MMOL/L (ref 99–110)
CO2: 28 MMOL/L (ref 21–32)
CREAT SERPL-MCNC: 0.7 MG/DL (ref 0.8–1.3)
EOSINOPHILS ABSOLUTE: 0.2 K/UL (ref 0–0.6)
EOSINOPHILS RELATIVE PERCENT: 3.3 %
GFR AFRICAN AMERICAN: >60
GFR NON-AFRICAN AMERICAN: >60
GLOBULIN: 3.9 G/DL
GLUCOSE BLD-MCNC: 126 MG/DL (ref 70–99)
HCT VFR BLD CALC: 42.6 % (ref 40.5–52.5)
HEMOGLOBIN: 14.3 G/DL (ref 13.5–17.5)
INR BLD: 1.93 (ref 0.86–1.14)
LYMPHOCYTES ABSOLUTE: 1 K/UL (ref 1–5.1)
LYMPHOCYTES RELATIVE PERCENT: 15 %
MCH RBC QN AUTO: 31.5 PG (ref 26–34)
MCHC RBC AUTO-ENTMCNC: 33.6 G/DL (ref 31–36)
MCV RBC AUTO: 93.8 FL (ref 80–100)
MONOCYTES ABSOLUTE: 0.5 K/UL (ref 0–1.3)
MONOCYTES RELATIVE PERCENT: 7.1 %
NEUTROPHILS ABSOLUTE: 5.1 K/UL (ref 1.7–7.7)
NEUTROPHILS RELATIVE PERCENT: 73.9 %
PDW BLD-RTO: 14.5 % (ref 12.4–15.4)
PLATELET # BLD: 182 K/UL (ref 135–450)
PMV BLD AUTO: 7.8 FL (ref 5–10.5)
POTASSIUM SERPL-SCNC: 4.4 MMOL/L (ref 3.5–5.1)
PRO-BNP: 989 PG/ML (ref 0–449)
PROTHROMBIN TIME: 22 SEC (ref 9.8–13)
RBC # BLD: 4.55 M/UL (ref 4.2–5.9)
REASON FOR REJECTION: NORMAL
REJECTED TEST: NORMAL
SODIUM BLD-SCNC: 139 MMOL/L (ref 136–145)
TOTAL PROTEIN: 8.5 G/DL (ref 6.4–8.2)
TROPONIN: <0.01 NG/ML
WBC # BLD: 6.9 K/UL (ref 4–11)

## 2019-11-01 PROCEDURE — 71045 X-RAY EXAM CHEST 1 VIEW: CPT

## 2019-11-01 PROCEDURE — 6370000000 HC RX 637 (ALT 250 FOR IP): Performed by: NURSE PRACTITIONER

## 2019-11-01 PROCEDURE — 99284 EMERGENCY DEPT VISIT MOD MDM: CPT

## 2019-11-01 PROCEDURE — 93005 ELECTROCARDIOGRAM TRACING: CPT | Performed by: NURSE PRACTITIONER

## 2019-11-01 PROCEDURE — 83880 ASSAY OF NATRIURETIC PEPTIDE: CPT

## 2019-11-01 PROCEDURE — 85025 COMPLETE CBC W/AUTO DIFF WBC: CPT

## 2019-11-01 PROCEDURE — 85610 PROTHROMBIN TIME: CPT

## 2019-11-01 PROCEDURE — 70450 CT HEAD/BRAIN W/O DYE: CPT

## 2019-11-01 PROCEDURE — 84484 ASSAY OF TROPONIN QUANT: CPT

## 2019-11-01 PROCEDURE — 80053 COMPREHEN METABOLIC PANEL: CPT

## 2019-11-01 RX ORDER — MECLIZINE HYDROCHLORIDE 25 MG/1
25 TABLET ORAL 3 TIMES DAILY PRN
Qty: 15 TABLET | Refills: 0 | Status: SHIPPED | OUTPATIENT
Start: 2019-11-01 | End: 2019-11-11

## 2019-11-01 RX ORDER — MECLIZINE HCL 12.5 MG/1
25 TABLET ORAL ONCE
Status: COMPLETED | OUTPATIENT
Start: 2019-11-01 | End: 2019-11-01

## 2019-11-01 RX ADMIN — MECLIZINE 25 MG: 12.5 TABLET ORAL at 17:49

## 2019-11-01 ASSESSMENT — ENCOUNTER SYMPTOMS
CHEST TIGHTNESS: 0
DIARRHEA: 0
ABDOMINAL PAIN: 0
VOMITING: 0
NAUSEA: 0
SHORTNESS OF BREATH: 0

## 2019-11-02 LAB
EKG ATRIAL RATE: 111 BPM
EKG DIAGNOSIS: NORMAL
EKG Q-T INTERVAL: 360 MS
EKG QRS DURATION: 86 MS
EKG QTC CALCULATION (BAZETT): 473 MS
EKG R AXIS: 2 DEGREES
EKG T AXIS: 6 DEGREES
EKG VENTRICULAR RATE: 104 BPM

## 2019-11-02 PROCEDURE — 93010 ELECTROCARDIOGRAM REPORT: CPT | Performed by: INTERNAL MEDICINE

## 2019-11-14 ENCOUNTER — ANTI-COAG VISIT (OUTPATIENT)
Dept: PHARMACY | Age: 84
End: 2019-11-14
Payer: MEDICARE

## 2019-11-14 DIAGNOSIS — I48.91 ATRIAL FIBRILLATION, UNSPECIFIED TYPE (HCC): ICD-10-CM

## 2019-11-14 LAB — INTERNATIONAL NORMALIZATION RATIO, POC: 2.8

## 2019-11-14 PROCEDURE — 99211 OFF/OP EST MAY X REQ PHY/QHP: CPT

## 2019-11-14 PROCEDURE — 85610 PROTHROMBIN TIME: CPT

## 2019-11-21 ENCOUNTER — OFFICE VISIT (OUTPATIENT)
Dept: CARDIOLOGY CLINIC | Age: 84
End: 2019-11-21
Payer: MEDICARE

## 2019-11-21 ENCOUNTER — TELEPHONE (OUTPATIENT)
Dept: CARDIOLOGY CLINIC | Age: 84
End: 2019-11-21

## 2019-11-21 VITALS
SYSTOLIC BLOOD PRESSURE: 110 MMHG | HEIGHT: 70 IN | BODY MASS INDEX: 32.1 KG/M2 | HEART RATE: 108 BPM | WEIGHT: 224.2 LBS | DIASTOLIC BLOOD PRESSURE: 66 MMHG

## 2019-11-21 DIAGNOSIS — I48.91 ATRIAL FIBRILLATION, UNSPECIFIED TYPE (HCC): Primary | ICD-10-CM

## 2019-11-21 DIAGNOSIS — E78.00 PURE HYPERCHOLESTEROLEMIA: ICD-10-CM

## 2019-11-21 DIAGNOSIS — I25.10 CORONARY ARTERY DISEASE INVOLVING NATIVE HEART WITHOUT ANGINA PECTORIS, UNSPECIFIED VESSEL OR LESION TYPE: ICD-10-CM

## 2019-11-21 DIAGNOSIS — I77.9 BILATERAL CAROTID ARTERY DISEASE, UNSPECIFIED TYPE (HCC): ICD-10-CM

## 2019-11-21 DIAGNOSIS — I10 ESSENTIAL HYPERTENSION: ICD-10-CM

## 2019-11-21 DIAGNOSIS — E11.9 TYPE 2 DIABETES MELLITUS WITHOUT COMPLICATION, WITHOUT LONG-TERM CURRENT USE OF INSULIN (HCC): ICD-10-CM

## 2019-11-21 PROCEDURE — 4040F PNEUMOC VAC/ADMIN/RCVD: CPT | Performed by: INTERNAL MEDICINE

## 2019-11-21 PROCEDURE — 99214 OFFICE O/P EST MOD 30 MIN: CPT | Performed by: INTERNAL MEDICINE

## 2019-11-21 PROCEDURE — 1036F TOBACCO NON-USER: CPT | Performed by: INTERNAL MEDICINE

## 2019-11-21 PROCEDURE — 0296T PR EXT ECG > 48HR TO 21 DAY RCRD W/CONECT INTL RCRD: CPT | Performed by: INTERNAL MEDICINE

## 2019-11-21 PROCEDURE — G8427 DOCREV CUR MEDS BY ELIG CLIN: HCPCS | Performed by: INTERNAL MEDICINE

## 2019-11-21 PROCEDURE — G8484 FLU IMMUNIZE NO ADMIN: HCPCS | Performed by: INTERNAL MEDICINE

## 2019-11-21 PROCEDURE — G8598 ASA/ANTIPLAT THER USED: HCPCS | Performed by: INTERNAL MEDICINE

## 2019-11-21 PROCEDURE — G8417 CALC BMI ABV UP PARAM F/U: HCPCS | Performed by: INTERNAL MEDICINE

## 2019-11-21 PROCEDURE — 1123F ACP DISCUSS/DSCN MKR DOCD: CPT | Performed by: INTERNAL MEDICINE

## 2019-11-25 ENCOUNTER — HOSPITAL ENCOUNTER (OUTPATIENT)
Dept: CARDIAC REHAB | Age: 84
Discharge: HOME | End: 2019-11-25

## 2019-11-25 PROCEDURE — 9900000038 HC CARDIAC REHAB PHASE 3 - MONTHLY

## 2019-12-02 ENCOUNTER — OFFICE VISIT (OUTPATIENT)
Dept: INTERNAL MEDICINE CLINIC | Age: 84
End: 2019-12-02
Payer: MEDICARE

## 2019-12-02 VITALS
HEART RATE: 68 BPM | DIASTOLIC BLOOD PRESSURE: 68 MMHG | SYSTOLIC BLOOD PRESSURE: 108 MMHG | HEIGHT: 70 IN | BODY MASS INDEX: 32.07 KG/M2 | WEIGHT: 224 LBS

## 2019-12-02 DIAGNOSIS — I10 ESSENTIAL HYPERTENSION: ICD-10-CM

## 2019-12-02 DIAGNOSIS — E78.00 PURE HYPERCHOLESTEROLEMIA: ICD-10-CM

## 2019-12-02 DIAGNOSIS — N13.8 BPH WITH URINARY OBSTRUCTION: ICD-10-CM

## 2019-12-02 DIAGNOSIS — Z23 NEED FOR INFLUENZA VACCINATION: ICD-10-CM

## 2019-12-02 DIAGNOSIS — N40.1 BPH WITH URINARY OBSTRUCTION: ICD-10-CM

## 2019-12-02 DIAGNOSIS — E11.9 TYPE 2 DIABETES MELLITUS WITHOUT COMPLICATION, WITHOUT LONG-TERM CURRENT USE OF INSULIN (HCC): Primary | ICD-10-CM

## 2019-12-02 LAB — HBA1C MFR BLD: 6.6 %

## 2019-12-02 PROCEDURE — G8484 FLU IMMUNIZE NO ADMIN: HCPCS | Performed by: INTERNAL MEDICINE

## 2019-12-02 PROCEDURE — 90653 IIV ADJUVANT VACCINE IM: CPT | Performed by: INTERNAL MEDICINE

## 2019-12-02 PROCEDURE — 1036F TOBACCO NON-USER: CPT | Performed by: INTERNAL MEDICINE

## 2019-12-02 PROCEDURE — 1123F ACP DISCUSS/DSCN MKR DOCD: CPT | Performed by: INTERNAL MEDICINE

## 2019-12-02 PROCEDURE — G8417 CALC BMI ABV UP PARAM F/U: HCPCS | Performed by: INTERNAL MEDICINE

## 2019-12-02 PROCEDURE — G0008 ADMIN INFLUENZA VIRUS VAC: HCPCS | Performed by: INTERNAL MEDICINE

## 2019-12-02 PROCEDURE — 83036 HEMOGLOBIN GLYCOSYLATED A1C: CPT | Performed by: INTERNAL MEDICINE

## 2019-12-02 PROCEDURE — G8427 DOCREV CUR MEDS BY ELIG CLIN: HCPCS | Performed by: INTERNAL MEDICINE

## 2019-12-02 PROCEDURE — 4040F PNEUMOC VAC/ADMIN/RCVD: CPT | Performed by: INTERNAL MEDICINE

## 2019-12-02 PROCEDURE — G8598 ASA/ANTIPLAT THER USED: HCPCS | Performed by: INTERNAL MEDICINE

## 2019-12-02 PROCEDURE — 99214 OFFICE O/P EST MOD 30 MIN: CPT | Performed by: INTERNAL MEDICINE

## 2019-12-02 RX ORDER — LANCETS
2-4 EACH MISCELLANEOUS DAILY
Qty: 300 EACH | Refills: 3 | Status: SHIPPED | OUTPATIENT
Start: 2019-12-02 | End: 2019-12-18 | Stop reason: SDUPTHER

## 2019-12-10 PROCEDURE — 0298T PR EXT ECG > 48HR TO 21 DAY REVIEW AND INTERPRETATN: CPT | Performed by: INTERNAL MEDICINE

## 2019-12-11 ENCOUNTER — TELEPHONE (OUTPATIENT)
Dept: CARDIOLOGY CLINIC | Age: 84
End: 2019-12-11

## 2019-12-16 ENCOUNTER — TELEPHONE (OUTPATIENT)
Dept: INTERNAL MEDICINE CLINIC | Age: 84
End: 2019-12-16

## 2019-12-16 DIAGNOSIS — E11.9 TYPE 2 DIABETES MELLITUS WITHOUT COMPLICATION, WITHOUT LONG-TERM CURRENT USE OF INSULIN (HCC): ICD-10-CM

## 2019-12-18 RX ORDER — LANCETS
2-4 EACH MISCELLANEOUS DAILY
Qty: 300 EACH | Refills: 3 | Status: SHIPPED | OUTPATIENT
Start: 2019-12-18

## 2019-12-19 ENCOUNTER — ANTI-COAG VISIT (OUTPATIENT)
Dept: PHARMACY | Age: 84
End: 2019-12-19
Payer: MEDICARE

## 2019-12-19 DIAGNOSIS — I48.91 ATRIAL FIBRILLATION, UNSPECIFIED TYPE (HCC): ICD-10-CM

## 2019-12-19 LAB — INTERNATIONAL NORMALIZATION RATIO, POC: 2.4

## 2019-12-19 PROCEDURE — 99211 OFF/OP EST MAY X REQ PHY/QHP: CPT

## 2019-12-19 PROCEDURE — 85610 PROTHROMBIN TIME: CPT

## 2019-12-19 RX ORDER — BLOOD SUGAR DIAGNOSTIC
1 STRIP MISCELLANEOUS 4 TIMES DAILY
Qty: 300 EACH | Refills: 3 | Status: SHIPPED | OUTPATIENT
Start: 2019-12-19 | End: 2021-04-05 | Stop reason: SDUPTHER

## 2019-12-27 PROCEDURE — 9900000038 HC CARDIAC REHAB PHASE 3 - MONTHLY

## 2019-12-30 ENCOUNTER — HOSPITAL ENCOUNTER (OUTPATIENT)
Dept: CARDIAC REHAB | Age: 84
Discharge: HOME | End: 2019-12-30

## 2020-01-03 PROCEDURE — 9900000038 HC CARDIAC REHAB PHASE 3 - MONTHLY

## 2020-01-08 ENCOUNTER — HOSPITAL ENCOUNTER (OUTPATIENT)
Dept: VASCULAR LAB | Age: 85
Discharge: HOME | End: 2020-01-08
Payer: MEDICARE

## 2020-01-08 PROCEDURE — 93880 EXTRACRANIAL BILAT STUDY: CPT

## 2020-01-17 ENCOUNTER — TELEPHONE (OUTPATIENT)
Dept: VASCULAR SURGERY | Age: 85
End: 2020-01-17

## 2020-01-22 ENCOUNTER — HOSPITAL ENCOUNTER (OUTPATIENT)
Dept: VASCULAR LAB | Age: 85
Discharge: HOME | End: 2020-01-22
Payer: MEDICARE

## 2020-01-22 PROCEDURE — 93971 EXTREMITY STUDY: CPT

## 2020-01-27 ENCOUNTER — HOSPITAL ENCOUNTER (OUTPATIENT)
Dept: CARDIAC REHAB | Age: 85
Discharge: HOME | End: 2020-01-27

## 2020-01-30 ENCOUNTER — ANTI-COAG VISIT (OUTPATIENT)
Dept: PHARMACY | Age: 85
End: 2020-01-30
Payer: MEDICARE

## 2020-01-30 LAB — INTERNATIONAL NORMALIZATION RATIO, POC: 2.7

## 2020-01-30 PROCEDURE — 85610 PROTHROMBIN TIME: CPT

## 2020-01-30 PROCEDURE — 99211 OFF/OP EST MAY X REQ PHY/QHP: CPT

## 2020-01-30 NOTE — PROGRESS NOTES
Mr. Mau Dodson is a 80 y.o. y/o male with history of Afib   He presents today for anticoagulation monitoring and adjustment. Pertinent PMH: DM, CAD, HTN and Colon CA    Patient's wife  10/4/13. Patient Reported Findings:  Yes     No  [x]   []       Patient verifies current dosing regimen as listed   []   [x]       S/S bleeding/bruising/swelling/SOB - denies, no dizziness   []   [x]       Blood in urine or stool  []   [x]       Procedures scheduled in the future at this time   []   [x]       Missed Dose    []   [x]       Extra Dose  []   [x]       Change in medications - takes tylenol as needed ---> taking tylenol still for knee aches---> no changes   []   [x]       Change in health/diet/appetite-Has vegetables 1-2 x weekly. ---> no changes   []   [x]       Change in alcohol use - has an occasional beer or bourbon like usual --->a few beers   []   [x]       Change in activity  []   [x]       Hospital admission  []   [x]       Emergency department visit  []   [x]       Other complaints  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 Having knee pain- using tylenol and cane, but says improving. Clinical Outcomes:  Yes     No  []   [x]       Major bleeding event  []   [x]       Thromboembolic event    Duration of warfarin Therapy: indefinite  INR Range:  2.0-3.0    INR 2.7 today   Continue weekly dose to 7.5mg on Sun & Wed and 5mg (1 tablet) every day. Encouraged to maintain a consistency of vegetables/salads.    Recheck INR in 6 weeks, 3/12    Referring cardiologist is Dr. Rosie Bowen  PCP Dr. Rachel Grant  INR (no units)   Date Value   2020 2.7   2019 2.4   2019 2.8   2019 1.93 (H)   10/03/2019 2.9   2018 2.6   2018 2.6   2018 3

## 2020-02-14 ENCOUNTER — CARE COORDINATION (OUTPATIENT)
Dept: CARE COORDINATION | Age: 85
End: 2020-02-14

## 2020-02-24 ENCOUNTER — HOSPITAL ENCOUNTER (OUTPATIENT)
Dept: CARDIAC REHAB | Age: 85
Discharge: HOME | End: 2020-02-24

## 2020-02-24 PROCEDURE — 9900000038 HC CARDIAC REHAB PHASE 3 - MONTHLY

## 2020-03-11 RX ORDER — METOPROLOL SUCCINATE 100 MG/1
TABLET, EXTENDED RELEASE ORAL
Qty: 45 TABLET | Refills: 3 | Status: SHIPPED | OUTPATIENT
Start: 2020-03-11 | End: 2021-01-18

## 2020-03-11 RX ORDER — SIMVASTATIN 20 MG
TABLET ORAL
Qty: 90 TABLET | Refills: 3 | Status: SHIPPED | OUTPATIENT
Start: 2020-03-11 | End: 2021-05-25 | Stop reason: SDUPTHER

## 2020-03-11 RX ORDER — GLIMEPIRIDE 1 MG/1
TABLET ORAL
Qty: 90 TABLET | Refills: 3 | Status: SHIPPED | OUTPATIENT
Start: 2020-03-11 | End: 2021-01-18

## 2020-03-11 RX ORDER — DILTIAZEM HYDROCHLORIDE 120 MG/1
CAPSULE, COATED, EXTENDED RELEASE ORAL
Qty: 90 CAPSULE | Refills: 3 | Status: SHIPPED | OUTPATIENT
Start: 2020-03-11 | End: 2021-04-05 | Stop reason: SDUPTHER

## 2020-03-12 ENCOUNTER — ANTI-COAG VISIT (OUTPATIENT)
Dept: PHARMACY | Age: 85
End: 2020-03-12
Payer: MEDICARE

## 2020-03-12 LAB — INTERNATIONAL NORMALIZATION RATIO, POC: 3.1

## 2020-03-12 PROCEDURE — 85610 PROTHROMBIN TIME: CPT

## 2020-03-12 PROCEDURE — 99211 OFF/OP EST MAY X REQ PHY/QHP: CPT

## 2020-06-01 ENCOUNTER — VIRTUAL VISIT (OUTPATIENT)
Dept: INTERNAL MEDICINE CLINIC | Age: 85
End: 2020-06-01
Payer: MEDICARE

## 2020-06-01 VITALS — WEIGHT: 205 LBS | BODY MASS INDEX: 29.35 KG/M2 | HEIGHT: 70 IN

## 2020-06-01 PROCEDURE — G0438 PPPS, INITIAL VISIT: HCPCS | Performed by: INTERNAL MEDICINE

## 2020-06-01 PROCEDURE — 1123F ACP DISCUSS/DSCN MKR DOCD: CPT | Performed by: INTERNAL MEDICINE

## 2020-06-01 PROCEDURE — 4040F PNEUMOC VAC/ADMIN/RCVD: CPT | Performed by: INTERNAL MEDICINE

## 2020-06-01 ASSESSMENT — LIFESTYLE VARIABLES
HAVE YOU OR SOMEONE ELSE BEEN INJURED AS A RESULT OF YOUR DRINKING: 0
HOW MANY STANDARD DRINKS CONTAINING ALCOHOL DO YOU HAVE ON A TYPICAL DAY: 0
HOW OFTEN DURING THE LAST YEAR HAVE YOU FOUND THAT YOU WERE NOT ABLE TO STOP DRINKING ONCE YOU HAD STARTED: 0
HOW OFTEN DURING THE LAST YEAR HAVE YOU BEEN UNABLE TO REMEMBER WHAT HAPPENED THE NIGHT BEFORE BECAUSE YOU HAD BEEN DRINKING: 0
HOW OFTEN DO YOU HAVE A DRINK CONTAINING ALCOHOL: 4
HOW OFTEN DURING THE LAST YEAR HAVE YOU HAD A FEELING OF GUILT OR REMORSE AFTER DRINKING: 0
HAS A RELATIVE, FRIEND, DOCTOR, OR ANOTHER HEALTH PROFESSIONAL EXPRESSED CONCERN ABOUT YOUR DRINKING OR SUGGESTED YOU CUT DOWN: 0
AUDIT TOTAL SCORE: 4
HOW OFTEN DURING THE LAST YEAR HAVE YOU FAILED TO DO WHAT WAS NORMALLY EXPECTED FROM YOU BECAUSE OF DRINKING: 0
HOW OFTEN DO YOU HAVE SIX OR MORE DRINKS ON ONE OCCASION: 0
AUDIT-C TOTAL SCORE: 4
HOW OFTEN DURING THE LAST YEAR HAVE YOU NEEDED AN ALCOHOLIC DRINK FIRST THING IN THE MORNING TO GET YOURSELF GOING AFTER A NIGHT OF HEAVY DRINKING: 0

## 2020-06-01 ASSESSMENT — PATIENT HEALTH QUESTIONNAIRE - PHQ9
SUM OF ALL RESPONSES TO PHQ QUESTIONS 1-9: 0
SUM OF ALL RESPONSES TO PHQ QUESTIONS 1-9: 0

## 2020-06-01 NOTE — PATIENT INSTRUCTIONS
Personalized Preventive Plan for Eve Tony - 6/1/2020  Medicare offers a range of preventive health benefits. Some of the tests and screenings are paid in full while other may be subject to a deductible, co-insurance, and/or copay. Some of these benefits include a comprehensive review of your medical history including lifestyle, illnesses that may run in your family, and various assessments and screenings as appropriate. After reviewing your medical record and screening and assessments performed today your provider may have ordered immunizations, labs, imaging, and/or referrals for you. A list of these orders (if applicable) as well as your Preventive Care list are included within your After Visit Summary for your review. Other Preventive Recommendations:    · A preventive eye exam performed by an eye specialist is recommended every 1-2 years to screen for glaucoma; cataracts, macular degeneration, and other eye disorders. · A preventive dental visit is recommended every 6 months. · Try to get at least 150 minutes of exercise per week or 10,000 steps per day on a pedometer . · Order or download the FREE \"Exercise & Physical Activity: Your Everyday Guide\" from [employer]. Call 5-303.793.7322 or search [employer] online. · You need 4266-2005 mg of calcium and 0712-0252 IU of vitamin D per day. It is possible to meet your calcium requirement with diet alone, but a vitamin D supplement is usually necessary to meet this goal.  · When exposed to the sun, use a sunscreen that protects against both UVA and UVB radiation with an SPF of 30 or greater. Reapply every 2 to 3 hours or after sweating, drying off with a towel, or swimming. · Always wear a seat belt when traveling in a car. Always wear a helmet when riding a bicycle or motorcycle. 07-Aug-2018 11:48

## 2020-06-04 ENCOUNTER — ANTI-COAG VISIT (OUTPATIENT)
Dept: PHARMACY | Age: 85
End: 2020-06-04
Payer: MEDICARE

## 2020-06-04 ENCOUNTER — OFFICE VISIT (OUTPATIENT)
Dept: CARDIOLOGY CLINIC | Age: 85
End: 2020-06-04
Payer: MEDICARE

## 2020-06-04 VITALS
HEIGHT: 71 IN | HEART RATE: 88 BPM | WEIGHT: 223.6 LBS | BODY MASS INDEX: 31.3 KG/M2 | SYSTOLIC BLOOD PRESSURE: 136 MMHG | DIASTOLIC BLOOD PRESSURE: 74 MMHG

## 2020-06-04 VITALS — TEMPERATURE: 97 F

## 2020-06-04 LAB — INTERNATIONAL NORMALIZATION RATIO, POC: 2.3

## 2020-06-04 PROCEDURE — 99214 OFFICE O/P EST MOD 30 MIN: CPT | Performed by: INTERNAL MEDICINE

## 2020-06-04 PROCEDURE — 1036F TOBACCO NON-USER: CPT | Performed by: INTERNAL MEDICINE

## 2020-06-04 PROCEDURE — G8417 CALC BMI ABV UP PARAM F/U: HCPCS | Performed by: INTERNAL MEDICINE

## 2020-06-04 PROCEDURE — 4040F PNEUMOC VAC/ADMIN/RCVD: CPT | Performed by: INTERNAL MEDICINE

## 2020-06-04 PROCEDURE — G8427 DOCREV CUR MEDS BY ELIG CLIN: HCPCS | Performed by: INTERNAL MEDICINE

## 2020-06-04 PROCEDURE — 99211 OFF/OP EST MAY X REQ PHY/QHP: CPT

## 2020-06-04 PROCEDURE — 1123F ACP DISCUSS/DSCN MKR DOCD: CPT | Performed by: INTERNAL MEDICINE

## 2020-06-04 PROCEDURE — 85610 PROTHROMBIN TIME: CPT

## 2020-06-04 NOTE — PROGRESS NOTES
Medication Sig Dispense Refill    dilTIAZem (CARDIZEM CD) 120 MG extended release capsule TAKE 1 CAPSULE EVERY DAY 90 capsule 3    metoprolol succinate (TOPROL XL) 100 MG extended release tablet TAKE 1/2 TABLET EVERY DAY 45 tablet 3    glimepiride (AMARYL) 1 MG tablet TAKE 1 TABLET EVERY MORNING 90 tablet 3    simvastatin (ZOCOR) 20 MG tablet TAKE 1 TABLET EVERY NIGHT 90 tablet 3    ONETOUCH VERIO strip 1 each by In Vitro route 4 times daily Test 2-4 times a day E11.9 300 each 3    Lancets Thin MISC 2-4 Devices by Does not apply route daily 2-4 leilani daily 300 each 3    tamsulosin (FLOMAX) 0.4 MG capsule Take 1 capsule by mouth daily 90 capsule 3    bimatoprost (LUMIGAN) 0.01 % SOLN ophthalmic drops Place 1 drop into both eyes nightly      dorzolamide (TRUSOPT) 2 % ophthalmic solution Place 1 drop into both eyes 2 times daily       warfarin (COUMADIN) 5 MG tablet Take 7.5 mg (one and a half tablets) coumadin every evening until directed otherwise by Dr. Hardy Moses (Patient taking differently: Take 5 mg by mouth See Admin Instructions Take 7.5mg on Sun & Wed and 5mg all other days as directed by [facility]) 90 tablet 3    brimonidine-timolol (COMBIGAN) 0.2-0.5 % ophthalmic solution Place 1 drop into both eyes every 12 hours. No current facility-administered medications for this visit. Allergies:  Hydrochlorothiazide; Metformin and related; and Morphine     Review of Systems:   · Constitutional: there has been no unanticipated weight loss. There's been no change in energy level, sleep pattern, or activity level. · Eyes: decreased vision-macular degeneration   · ENT: No Headaches, hearing loss or vertigo. No mouth sores or sore throat. · Cardiovascular: Reviewed in HPI  · Respiratory: No cough or wheezing, no sputum production. No hematemesis. · Gastrointestinal: No abdominal pain, appetite loss, blood in stools. No change in bowel or bladder habits.   · Genitourinary: No dysuria, trouble voiding,

## 2020-07-16 ENCOUNTER — ANTI-COAG VISIT (OUTPATIENT)
Dept: PHARMACY | Age: 85
End: 2020-07-16
Payer: MEDICARE

## 2020-07-16 VITALS — TEMPERATURE: 97.3 F

## 2020-07-16 LAB — INTERNATIONAL NORMALIZATION RATIO, POC: 2.9

## 2020-07-16 PROCEDURE — 99211 OFF/OP EST MAY X REQ PHY/QHP: CPT

## 2020-07-16 PROCEDURE — 85610 PROTHROMBIN TIME: CPT

## 2020-07-16 RX ORDER — PREDNISOLONE ACETATE 10 MG/ML
1 SUSPENSION/ DROPS OPHTHALMIC 3 TIMES DAILY
COMMUNITY
Start: 2020-06-25

## 2020-07-16 NOTE — PROGRESS NOTES
Mr. Leora Guy is a 80 y.o. y/o male with history of Afib   He presents today for anticoagulation monitoring and adjustment. Pertinent PMH: DM, CAD, HTN and Colon CA    Patient's wife  10/4/13. Patient Reported Findings:  Yes     No  [x]   []       Patient verifies current dosing regimen as listed   []   [x]       S/S bleeding/bruising/swelling/SOB - denies  []   [x]       Blood in urine or stool- denies   []   [x]       Procedures scheduled in the future at this time   []   [x]       Missed Dose    []   [x]       Extra Dose- denies   []   [x]       Change in medications - takes tylenol as needed ---> taking tylenol still for knee aches---> no changes, tylenol prn    []   [x]       Change in health/diet/appetite-Has vegetables 1-2 x weekly. ---> no changes---> states less greens - down to once weekly, no NVD    []   [x]       Change in alcohol use - has an occasional beer or bourbon like usual --->a few beers---> continues with alcohol    []   [x]       Change in activity  []   [x]       Hospital admission  []   [x]       Emergency department visit  [x]   []       Other complaints  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 --> struggling with eyesight     Having knee pain- using tylenol and cane, but says improving. Clinical Outcomes:  Yes     No  []   [x]       Major bleeding event  []   [x]       Thromboembolic event    Duration of warfarin Therapy: indefinite  INR Range:  2.0-3.0    INR 2.9 today   Continue weekly dose of 7.5mg on Sun & Wed and 5mg (1 tablet) all other days. Encouraged to maintain a consistency of vegetables/salads.    Refilled warfarin at op pharmacy   Recheck INR in 6 weeks,     Referring cardiologist is Dr. Ever Painter  PCP Dr. Bianka Harmon  INR (no units)   Date Value   2020 2.9   2020 2.3   2020 3.1   2020 2.7   2019 1.93 (H)   2018 2.6   2018 2.6   2018 3     CLINICAL PHARMACY CONSULT: MED RECONCILIATION/REVIEW ADDENDUM    For Pharmacy Admin Tracking Only    PHSO: Yes  Total # of Interventions Recommended: 2  - Refills Provided #: 1  - Updated Order #: 1 Updated Order Reason(s):  Other  - Maintenance Safety Lab Monitoring #: 1  Total Interventions Accepted: 2  Time Spent (min): 15    Gypsy Barrera PharmD

## 2020-07-16 NOTE — TELEPHONE ENCOUNTER
Warfarin prescription phoned into [place] [facility] to [facility] under Dr. Ajit Joyce  Warfarin 5 mg tabs  Take 7.5 on Sun and Wed and 5 mg all other days of the week  90 days   2 refills

## 2020-08-18 ENCOUNTER — TELEPHONE (OUTPATIENT)
Dept: INTERNAL MEDICINE CLINIC | Age: 85
End: 2020-08-18

## 2020-08-18 RX ORDER — TAMSULOSIN HYDROCHLORIDE 0.4 MG/1
0.4 CAPSULE ORAL DAILY
Qty: 90 CAPSULE | Refills: 3 | Status: SHIPPED | OUTPATIENT
Start: 2020-08-18 | End: 2021-05-25 | Stop reason: SDUPTHER

## 2020-08-18 NOTE — TELEPHONE ENCOUNTER
Patient is requesting a prescription refill of tamsulosin (FLOMAX) 0.4 MG [facility] [address]

## 2020-08-27 ENCOUNTER — ANTI-COAG VISIT (OUTPATIENT)
Dept: PHARMACY | Age: 85
End: 2020-08-27
Payer: MEDICARE

## 2020-08-27 VITALS — TEMPERATURE: 97.7 F

## 2020-08-27 LAB — INTERNATIONAL NORMALIZATION RATIO, POC: 3.8

## 2020-08-27 PROCEDURE — 85610 PROTHROMBIN TIME: CPT

## 2020-08-27 PROCEDURE — 99211 OFF/OP EST MAY X REQ PHY/QHP: CPT

## 2020-08-27 NOTE — PROGRESS NOTES
Mr. Kim Saul is a 80 y.o. y/o male with history of Afib   He presents today for anticoagulation monitoring and adjustment. Pertinent PMH: DM, CAD, HTN and Colon CA    Patient's wife  10/4/13. Patient Reported Findings:  Yes     No  [x]   []       Patient verifies current dosing regimen as listed   []   [x]       S/S bleeding/bruising/swelling/SOB - denies  []   [x]       Blood in urine or stool- denies   []   [x]       Procedures scheduled in the future at this time   []   [x]       Missed Dose    []   [x]       Extra Dose- denies   []   [x]       Change in medications - takes tylenol as needed ---> taking tylenol still for knee aches---> no changes, tylenol prn --> Taking aleve   []   [x]       Change in health/diet/appetite-Has vegetables 1-2 x weekly. ---> no changes---> states less greens - down to once weekly, no NVD    []   [x]       Change in alcohol use - has an occasional beer or bourbon like usual --->a few beers---> continues with alcohol --> no changes  []   [x]       Change in activity  []   [x]       Hospital admission  []   [x]       Emergency department visit  [x]   []       Other complaints  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 --> struggling with eyesight     Having knee pain- using tylenol and cane, but says improving. Clinical Outcomes:  Yes     No  []   [x]       Major bleeding event  []   [x]       Thromboembolic event    Duration of warfarin Therapy: indefinite  INR Range:  2.0-3.0    INR 3.8 today due to aleve and alcohol   Hold tonight, then continue weekly dose of 7.5mg on Sun & Wed and 5mg (1 tablet) all other days. Encouraged to maintain a consistency of vegetables/salads.    Recheck INR in 6 weeks, 10/8    Referring cardiologist is Dr. Chauncey Nation  PCP Dr. Elver Duarte  INR (no units)   Date Value   2020 2.9   2020 2.3   2020 3.1   2020 2.7   2019 1.93 (H)   2018 2.6   2018 2.6   2018 3     CLINICAL

## 2020-10-08 ENCOUNTER — ANTI-COAG VISIT (OUTPATIENT)
Dept: PHARMACY | Age: 85
End: 2020-10-08
Payer: MEDICARE

## 2020-10-08 VITALS — TEMPERATURE: 97.5 F

## 2020-10-08 LAB — INTERNATIONAL NORMALIZATION RATIO, POC: 4

## 2020-10-08 PROCEDURE — 99211 OFF/OP EST MAY X REQ PHY/QHP: CPT

## 2020-10-08 PROCEDURE — 85610 PROTHROMBIN TIME: CPT

## 2020-10-08 NOTE — PROGRESS NOTES
Mr. Latonia Shin is a 80 y.o. y/o male with history of Afib   He presents today for anticoagulation monitoring and adjustment. Pertinent PMH: DM, CAD, HTN and Colon CA    Patient's wife  10/4/13. Patient Reported Findings:  Yes     No  [x]   []       Patient verifies current dosing regimen as listed   []   [x]       S/S bleeding/bruising/swelling/SOB - denies  []   [x]       Blood in urine or stool- denies   []   [x]       Procedures scheduled in the future at this time   []   [x]       Missed Dose-- may have missed a tablet at the end of september ()  []   [x]       Extra Dose- denies   []   [x]       Change in medications - takes tylenol as needed ---> taking tylenol still for knee aches---> no changes, tylenol prn --> Taking aleve--> occasional aleve 1 per day if needed   []   [x]       Change in health/diet/appetite-Has vegetables 1-2 x weekly. 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 --> struggling with eyesight     Having knee pain- using tylenol and cane, but says improving. Clinical Outcomes:  Yes     No  []   [x]       Major bleeding event  []   [x]       Thromboembolic event    Duration of warfarin Therapy: indefinite  INR Range:  2.0-3.0    INR 4.0 today and INR is out of range for the second appointment in a row. Per patient alcohol and aleve use has been the same and no acute changes    Hold tonight, then decrease weekly dose to 7.5mg on Wed and 5mg (1 tablet) all other days (6.3%)  Encouraged to maintain a consistency of vegetables/salads.    Recheck INR in 2 weeks, 10/22    Referring cardiologist is Dr. Jose Corado  PCP Dr. Maria Eugenia Krishnamurthy  INR (no units)   Date Value   10/08/2020 4.0   08/27/2020 3.8   07/16/2020 2.9   06/04/2020 2.3   11/01/2019 1.93 (H)   09/13/2018 2.6   08/16/2018 2.6   07/19/2018 3     CLINICAL PHARMACY CONSULT: MED RECONCILIATION/REVIEW ADDENDUM    For Pharmacy Admin Tracking Only    PHSO: Yes  Total # of Interventions Recommended: 1  - Decreased Dose #: 1  - Maintenance Safety Lab Monitoring #: 1  Total Interventions Accepted: 1  Time Spent (min): [address], PharmD

## 2020-10-22 ENCOUNTER — ANTI-COAG VISIT (OUTPATIENT)
Dept: PHARMACY | Age: 85
End: 2020-10-22
Payer: MEDICARE

## 2020-10-22 VITALS — TEMPERATURE: 97.1 F

## 2020-10-22 LAB — INTERNATIONAL NORMALIZATION RATIO, POC: 2.2

## 2020-10-22 PROCEDURE — 85610 PROTHROMBIN TIME: CPT

## 2020-10-22 PROCEDURE — 99211 OFF/OP EST MAY X REQ PHY/QHP: CPT

## 2020-10-22 NOTE — PROGRESS NOTES
Mr. Heather Kahn is a 80 y.o. y/o male with history of Afib   He presents today for anticoagulation monitoring and adjustment. Pertinent PMH: DM, CAD, HTN and Colon CA    Patient's wife  10/4/13. Patient Reported Findings:  Yes     No  [x]   []       Patient verifies current dosing regimen as listed   []   [x]       S/S bleeding/bruising/swelling/SOB - denies  []   [x]       Blood in urine or stool- denies   []   [x]       Procedures scheduled in the future at this time   []   [x]       Missed Dose   []   [x]       Extra Dose   []   [x]       Change in medications - takes tylenol as needed ---> taking tylenol still for knee aches---> no changes, tylenol prn --> Taking aleve--> occasional aleve 1 per day if needed   []   [x]       Change in health/diet/appetite-Has vegetables 1-2 x weekly. 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 --> struggling with eyesight     Having knee pain- using tylenol and cane, but says improving. Clinical Outcomes:  Yes     No  []   [x]       Major bleeding event  []   [x]       Thromboembolic event    Duration of warfarin Therapy: indefinite  INR Range:  2.0-3.0    INR is 2.2 today  Continue weekly dose of 7.5mg on Wed and 5mg (1 tablet) all other days  Encouraged to maintain a consistency of vegetables/salads.    Recheck INR in 4 weeks,  per patient request    Referring cardiologist is Dr. Jennifer Saul  PCP Dr. Cheri Warner  INR (no units)   Date Value   10/22/2020 2.2   10/08/2020 4.0   2020 3.8   2020 2.9 11/01/2019 1.93 (H)   09/13/2018 2.6   08/16/2018 2.6   07/19/2018 3     CLINICAL PHARMACY CONSULT: MED RECONCILIATION/REVIEW ADDENDUM    For Pharmacy Admin Tracking Only    PHSO: Yes  Total # of Interventions Recommended: 0  - Maintenance Safety Lab Monitoring #: 1  Total Interventions Accepted: 0  Time Spent (min): [address], PharmD

## 2020-11-19 ENCOUNTER — ANTI-COAG VISIT (OUTPATIENT)
Dept: PHARMACY | Age: 85
End: 2020-11-19
Payer: MEDICARE

## 2020-11-19 VITALS — TEMPERATURE: 96.9 F

## 2020-11-19 LAB — INTERNATIONAL NORMALIZATION RATIO, POC: 3.2

## 2020-11-19 PROCEDURE — 99211 OFF/OP EST MAY X REQ PHY/QHP: CPT

## 2020-11-19 PROCEDURE — 85610 PROTHROMBIN TIME: CPT

## 2020-11-19 NOTE — PROGRESS NOTES
Mr. Abiel Merchant is a 80 y.o. y/o male with history of Afib   He presents today for anticoagulation monitoring and adjustment. Pertinent PMH: DM, CAD, HTN and Colon CA    Patient's wife  10/4/13. Patient Reported Findings:  Yes     No  [x]   []       Patient verifies current dosing regimen as listed   []   [x]       S/S bleeding/bruising/swelling/SOB - denies  []   [x]       Blood in urine or stool- denies   []   [x]       Procedures scheduled in the future at this time   []   [x]       Missed Dose   []   [x]       Extra Dose   [x]   []       Change in medications - takes tylenol as needed for knee aches---> occasional aleve 1 per day if needed ---> has been taking 4 aleeve a day d/t knee pain acutely    []   [x]       Change in health/diet/appetite-Has vegetables 1-2 x weekly. 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 --> struggling with eyesight     Having knee pain- using tylenol and cane, but says improving. Clinical Outcomes:  Yes     No  []   [x]       Major bleeding event  []   [x]       Thromboembolic event    Duration of warfarin Therapy: indefinite  INR Range:  2.0-3.0    INR is 3.2 today after more aleeve than normal   Take 2.5 mg tonight then continue weekly dose of 7.5mg on Wed and 5mg (1 tablet) all other days  Encouraged to maintain a consistency of vegetables/salads.    Recheck INR in 3 weeks, 12/10 d/t other appt       Referring cardiologist is Dr. Frantz Hough  PCP Dr. Joss Rene  INR (no units)   Date Value   10/22/2020 2.2 10/08/2020 4.0   08/27/2020 3.8   07/16/2020 2.9   11/01/2019 1.93 (H)   09/13/2018 2.6   08/16/2018 2.6   07/19/2018 3     CLINICAL PHARMACY CONSULT: MED RECONCILIATION/REVIEW ADDENDUM    For Pharmacy Admin Tracking Only    PHSO: Yes  Total # of Interventions Recommended: 1  - Decreased Dose #: 1  - Maintenance Safety Lab Monitoring #: 1  Total Interventions Accepted: 1  Time Spent (min): 15    Cecelia AdamsD

## 2020-12-10 ENCOUNTER — ANTI-COAG VISIT (OUTPATIENT)
Dept: PHARMACY | Age: 85
End: 2020-12-10
Payer: MEDICARE

## 2020-12-10 ENCOUNTER — HOSPITAL ENCOUNTER (OUTPATIENT)
Age: 85
Discharge: HOME | End: 2020-12-10
Payer: MEDICARE

## 2020-12-10 ENCOUNTER — OFFICE VISIT (OUTPATIENT)
Dept: CARDIOLOGY CLINIC | Age: 85
End: 2020-12-10
Payer: MEDICARE

## 2020-12-10 VITALS
BODY MASS INDEX: 32.78 KG/M2 | HEART RATE: 80 BPM | DIASTOLIC BLOOD PRESSURE: 76 MMHG | SYSTOLIC BLOOD PRESSURE: 130 MMHG | HEIGHT: 70 IN | WEIGHT: 229 LBS

## 2020-12-10 VITALS — TEMPERATURE: 97.3 F

## 2020-12-10 LAB
ALBUMIN SERPL-MCNC: 4.3 G/DL (ref 3.4–5)
ALP BLD-CCNC: 84 U/L (ref 40–129)
ALT SERPL-CCNC: 12 U/L (ref 10–40)
AST SERPL-CCNC: 17 U/L (ref 15–37)
BASOPHILS ABSOLUTE: 0 K/UL (ref 0–0.2)
BASOPHILS RELATIVE PERCENT: 0.6 %
BILIRUB SERPL-MCNC: 0.7 MG/DL (ref 0–1)
BILIRUBIN DIRECT: <0.2 MG/DL (ref 0–0.3)
BILIRUBIN, INDIRECT: NORMAL MG/DL (ref 0–1)
CHOLESTEROL, TOTAL: 91 MG/DL (ref 0–199)
EOSINOPHILS ABSOLUTE: 0.2 K/UL (ref 0–0.6)
EOSINOPHILS RELATIVE PERCENT: 3.6 %
HCT VFR BLD CALC: 39.2 % (ref 40.5–52.5)
HDLC SERPL-MCNC: 40 MG/DL (ref 40–60)
HEMOGLOBIN: 13.3 G/DL (ref 13.5–17.5)
INTERNATIONAL NORMALIZATION RATIO, POC: 2.8
LDL CHOLESTEROL CALCULATED: 36 MG/DL
LYMPHOCYTES ABSOLUTE: 0.6 K/UL (ref 1–5.1)
LYMPHOCYTES RELATIVE PERCENT: 9.3 %
MCH RBC QN AUTO: 31.7 PG (ref 26–34)
MCHC RBC AUTO-ENTMCNC: 34 G/DL (ref 31–36)
MCV RBC AUTO: 93.3 FL (ref 80–100)
MONOCYTES ABSOLUTE: 0.5 K/UL (ref 0–1.3)
MONOCYTES RELATIVE PERCENT: 7.9 %
NEUTROPHILS ABSOLUTE: 5.2 K/UL (ref 1.7–7.7)
NEUTROPHILS RELATIVE PERCENT: 78.6 %
PDW BLD-RTO: 14.2 % (ref 12.4–15.4)
PLATELET # BLD: 185 K/UL (ref 135–450)
PMV BLD AUTO: 7.8 FL (ref 5–10.5)
RBC # BLD: 4.2 M/UL (ref 4.2–5.9)
TOTAL PROTEIN: 7.4 G/DL (ref 6.4–8.2)
TRIGL SERPL-MCNC: 75 MG/DL (ref 0–150)
VLDLC SERPL CALC-MCNC: 15 MG/DL
WBC # BLD: 6.6 K/UL (ref 4–11)

## 2020-12-10 PROCEDURE — 85610 PROTHROMBIN TIME: CPT

## 2020-12-10 PROCEDURE — G8427 DOCREV CUR MEDS BY ELIG CLIN: HCPCS | Performed by: INTERNAL MEDICINE

## 2020-12-10 PROCEDURE — 80061 LIPID PANEL: CPT

## 2020-12-10 PROCEDURE — 85025 COMPLETE CBC W/AUTO DIFF WBC: CPT

## 2020-12-10 PROCEDURE — G8484 FLU IMMUNIZE NO ADMIN: HCPCS | Performed by: INTERNAL MEDICINE

## 2020-12-10 PROCEDURE — 99214 OFFICE O/P EST MOD 30 MIN: CPT | Performed by: INTERNAL MEDICINE

## 2020-12-10 PROCEDURE — 80076 HEPATIC FUNCTION PANEL: CPT

## 2020-12-10 PROCEDURE — 36415 COLL VENOUS BLD VENIPUNCTURE: CPT

## 2020-12-10 PROCEDURE — 1036F TOBACCO NON-USER: CPT | Performed by: INTERNAL MEDICINE

## 2020-12-10 PROCEDURE — 99211 OFF/OP EST MAY X REQ PHY/QHP: CPT

## 2020-12-10 PROCEDURE — 4040F PNEUMOC VAC/ADMIN/RCVD: CPT | Performed by: INTERNAL MEDICINE

## 2020-12-10 PROCEDURE — G8417 CALC BMI ABV UP PARAM F/U: HCPCS | Performed by: INTERNAL MEDICINE

## 2020-12-10 PROCEDURE — 1123F ACP DISCUSS/DSCN MKR DOCD: CPT | Performed by: INTERNAL MEDICINE

## 2020-12-10 NOTE — PROGRESS NOTES
[facility]   Cardiac Follow up     Referring Provider:  Dory Ortega MD     Chief Complaint   Patient presents with    Atrial Fibrillation    Coronary Artery Disease    Hypertension      History of Present Illness:  Mr. Anson Erickson is a 80 y.o. male with a history of  CAD (JURGEN to LAD in 2005), hypertension, atrial fib, hyperlipidemia. He did have carotid surgery in Feb 2014. States he stopped smoking in the \"70's. Today, he states he is doing ok. He no longer drives. Uses a  walker for balance assist. Would like to go back to cardiac rehab. Denies exertional chest pain, FATIMA/PND, palpitations, light-headedness, edema. He is not symptomatic with afib. Past Medical History:   has a past medical history of Acute MI ([place]), Arthritis, Atrial fibrillation ([place]), CAD (coronary artery disease), Cancer of colon ([place]), Glaucoma, Gout, unspecified, Hypertension, Hypertrophy of prostate without urinary obstruction and other lower urinary tract symptoms (LUTS), Other and unspecified hyperlipidemia, and Type II or unspecified type diabetes mellitus without mention of complication, uncontrolled. Surgical History:   has a past surgical history that includes Colon surgery; Cataract removal; Coronary angioplasty with stent; other surgical history; Revision total knee arthroplasty (Right, 08/17/2011); Abdomen surgery (1992); Colonoscopy; Carotid endarterectomy (Left, 2/24/13); Cystocopy (3-27-14); joint replacement (2010); Eye surgery (Left, 03/02/2018); Eye surgery (Left, 04/13/2018); and eye surgery (Left, 2009). Social History:   reports that he quit smoking about 44 years ago. He has a 30.00 pack-year smoking history. He has never used smokeless tobacco. He reports current alcohol use of about 10.0 standard drinks of alcohol per week. He reports that he does not use drugs.      Family History:  family history includes Heart Disease in his mother; High Blood Pressure in his father and mother; · Gastrointestinal: No abdominal pain, appetite loss, blood in stools. No change in bowel or bladder habits. · Genitourinary: No dysuria, trouble voiding, or hematuria. · Musculoskeletal:  No gait disturbance, weakness. Right knee pain. · Integumentary: No rash or pruritis. · Neurological: No headache, diplopia, change in muscle strength, numbness or tingling. No change in gait, balance, coordination, mood, affect, memory, mentation, behavior. · Psychiatric: No anxiety, no depression. · Endocrine: No malaise, fatigue or temperature intolerance. No excessive thirst, fluid intake, or urination. No tremor. · Hematologic/Lymphatic: No abnormal bruising or bleeding, blood clots or swollen lymph nodes. · Allergic/Immunologic: No nasal congestion or hives. Physical Examination:    Vitals:    12/10/20 0855   BP: 130/76   Pulse: 80      Weight 223 lbs  Constitutional and General Appearance: NAD, well nourished   Skin:good turgor,intact without lesions  HEENT: EOMI ,normal  Neck:no JVD  Respiratory:  · Normal excursion and expansion without use of accessory muscles  · Resp Auscultation: Normal breath sounds without dullness  Cardiovascular:  · The apical impulses not displaced  · Heart tones are crisp and normal  · Cervical veins are not engorged  · The carotid upstroke is normal in amplitude and contour without delay or bruit  · Normal heart tone, irregular rhythm. · Peripheral pulses are symmetrical and full  · There is no clubbing, cyanosis of the extremities. · No edema  · Femoral Arteries: 2+ and equal  · Pedal Pulses: 2+ and equal   Abdomen:  · No masses or tenderness  · Liver/Spleen: No Abnormalities Noted  Neurological/Psychiatric:  · Alert and oriented in all spheres  · Moves all extremities well  · Exhibits normal gait balance and coordination  · No abnormalities of mood, affect, memory, mentation, or behavior are noted    Assessment/Plan:    1. Atrial fibrillation: Controlled. Irregular.  Remains on

## 2020-12-10 NOTE — PROGRESS NOTES
Mr. Dee Hanks is a 80 y.o. y/o male with history of Afib   He presents today for anticoagulation monitoring and adjustment. Pertinent PMH: DM, CAD, HTN and Colon CA    Patient's wife  10/4/13. Patient Reported Findings:  Yes     No  [x]   []       Patient verifies current dosing regimen as listed   []   [x]       S/S bleeding/bruising/swelling/SOB - denies  []   [x]       Blood in urine or stool- denies   []   [x]       Procedures scheduled in the future at this time   []   [x]       Missed Dose   []   [x]       Extra Dose   [x]   []       Change in medications - takes tylenol as needed for knee aches---> occasional aleve 1 per day if needed ---> has been taking 4 aleve a day d/t knee pain acutely-> no change   []   [x]       Change in health/diet/appetite-Has vegetables 1-2 x weekly. 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 --> struggling with eyesight     Having knee pain- using tylenol and cane, but says improving. Clinical Outcomes:  Yes     No  []   [x]       Major bleeding event  []   [x]       Thromboembolic event    Duration of warfarin Therapy: indefinite  INR Range:  2.0-3.0    INR is 2.8 today   Continue weekly dose of 7.5mg on Wed and 5mg all other days  Encouraged to maintain a consistency of vegetables/salads.    Recheck INR in 4 weeks,     Referring cardiologist is Dr. Epi Martinez  PCP Dr. Aby Cruz  INR (no units)   Date Value   12/10/2020 2.8   2020 3.2   10/22/2020 2.2   10/08/2020 4.0   2019 1.93 (H)

## 2020-12-17 ENCOUNTER — TELEPHONE (OUTPATIENT)
Dept: CARDIOLOGY CLINIC | Age: 85
End: 2020-12-17

## 2020-12-17 NOTE — TELEPHONE ENCOUNTER
Elizabet Troy MD  [facility] Staff             Labs are good. Let him know      Spoke with patient he verbally understood.

## 2021-01-14 ENCOUNTER — ANTI-COAG VISIT (OUTPATIENT)
Dept: PHARMACY | Age: 86
End: 2021-01-14
Payer: MEDICARE

## 2021-01-14 DIAGNOSIS — I48.91 ATRIAL FIBRILLATION, UNSPECIFIED TYPE (HCC): ICD-10-CM

## 2021-01-14 LAB — INTERNATIONAL NORMALIZATION RATIO, POC: 2.5

## 2021-01-14 PROCEDURE — 85610 PROTHROMBIN TIME: CPT

## 2021-01-14 PROCEDURE — 99211 OFF/OP EST MAY X REQ PHY/QHP: CPT

## 2021-01-14 NOTE — PROGRESS NOTES
Mr. Tammy Brenner is a 80 y.o. y/o male with history of Afib   He presents today for anticoagulation monitoring and adjustment. Pertinent PMH: DM, CAD, HTN and Colon CA    Patient's wife  10/4/13. Patient Reported Findings:  Yes     No  [x]   []       Patient verifies current dosing regimen as listed   []   [x]       S/S bleeding/bruising/swelling/SOB - denies  []   [x]       Blood in urine or stool- denies   []   [x]       Procedures scheduled in the future at this time   []   [x]       Missed Dose   []   [x]       Extra Dose   [x]   []       Change in medications - takes tylenol as needed for knee aches---> occasional aleve 1 per day if needed ---> has been taking 4 aleve a day d/t knee pain acutely-> no changes   []   [x]       Change in health/diet/appetite-Has vegetables 1-2 x weekly. 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 --> struggling with eyesight     Having knee pain- using tylenol and cane, but says improving. Clinical Outcomes:  Yes     No  []   [x]       Major bleeding event  []   [x]       Thromboembolic event    Duration of warfarin Therapy: indefinite  INR Range:  2.0-3.0    INR is 2.5 today   Continue weekly dose of 7.5mg on Wed and 5mg all other days. Encouraged to maintain a consistency of vegetables/salads.    Recheck INR in 4 weeks,     Referring cardiologist is Dr. Ana Velázquez  PCP Dr. Ronel Camacho  INR (no units)   Date Value   2021 2.5   12/10/2020 2.8   2020 3.2   10/22/2020 2.2   2019 1.93 (H) 09/13/2018 2.6   08/16/2018 2.6   07/19/2018 3     CLINICAL PHARMACY CONSULT: MED RECONCILIATION/REVIEW ADDENDUM    For Pharmacy Admin Tracking Only    PHSO: Yes  Total # of Interventions Recommended: 0  - Maintenance Safety Lab Monitoring #: 1  Total Interventions Accepted: 0  Time Spent (min): [address], PharmD

## 2021-01-18 RX ORDER — GLIMEPIRIDE 1 MG/1
TABLET ORAL
Qty: 90 TABLET | Refills: 0 | Status: SHIPPED | OUTPATIENT
Start: 2021-01-18 | Stop reason: SDUPTHER

## 2021-01-18 RX ORDER — METOPROLOL SUCCINATE 100 MG/1
TABLET, EXTENDED RELEASE ORAL
Qty: 45 TABLET | Refills: 0 | Status: SHIPPED | OUTPATIENT
Start: 2021-01-18 | Stop reason: SDUPTHER

## 2021-02-02 ENCOUNTER — HOSPITAL ENCOUNTER (OUTPATIENT)
Dept: VASCULAR LAB | Age: 86
Discharge: HOME | End: 2021-02-02
Payer: MEDICARE

## 2021-02-02 DIAGNOSIS — I65.23 CAROTID ATHEROSCLEROSIS, BILATERAL: ICD-10-CM

## 2021-02-02 PROCEDURE — 93880 EXTRACRANIAL BILAT STUDY: CPT

## 2021-02-05 ENCOUNTER — TELEPHONE (OUTPATIENT)
Dept: VASCULAR SURGERY | Age: 86
End: 2021-02-05

## 2021-02-05 DIAGNOSIS — I65.23 CAROTID ATHEROSCLEROSIS, BILATERAL: Primary | ICD-10-CM

## 2021-02-05 NOTE — TELEPHONE ENCOUNTER
Discussed results of carotid duplex with patient which shows no significant progression in carotid artery disease with less than 50% stenosis of bilateral ICAs. Plan to continue surveillance with repeat carotid duplex in 1 year.     Electronically signed by SULEMAN Garcia CNP on 2/5/2021 at 11:18 AM

## 2021-02-11 ENCOUNTER — ANTI-COAG VISIT (OUTPATIENT)
Dept: PHARMACY | Age: 86
End: 2021-02-11
Payer: MEDICARE

## 2021-02-11 VITALS — TEMPERATURE: 96.2 F

## 2021-02-11 DIAGNOSIS — I48.91 ATRIAL FIBRILLATION, UNSPECIFIED TYPE (HCC): ICD-10-CM

## 2021-02-11 LAB — INTERNATIONAL NORMALIZATION RATIO, POC: 2.9

## 2021-02-11 PROCEDURE — 99211 OFF/OP EST MAY X REQ PHY/QHP: CPT

## 2021-02-11 PROCEDURE — 85610 PROTHROMBIN TIME: CPT

## 2021-02-11 NOTE — PROGRESS NOTES
Mr. Jessica Chavez is a 80 y.o. y/o male with history of Afib   He presents today for anticoagulation monitoring and adjustment. Pertinent PMH: DM, CAD, HTN and Colon CA    Patient's wife  10/4/13. Patient Reported Findings:  Yes     No  [x]   []       Patient verifies current dosing regimen as listed   []   [x]       S/S bleeding/bruising/swelling/SOB - denies  []   [x]       Blood in urine or stool- denies   []   [x]       Procedures scheduled in the future at this time   []   [x]       Missed Dose   []   [x]       Extra Dose   []   [x]       Change in medications - takes tylenol as needed for knee aches---> occasional aleve 1 per day if needed ---> has been taking 4 aleve a day d/t knee pain acutely-> no changes   []   [x]       Change in health/diet/appetite-Has vegetables 1-2 x weekly. 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 --> struggling with eyesight     Having knee pain- using tylenol and cane, but says improving. Clinical Outcomes:  Yes     No  []   [x]       Major bleeding event  []   [x]       Thromboembolic event    Duration of warfarin Therapy: indefinite  INR Range:  2.0-3.0    INR is 2.9 today   Continue weekly dose of 7.5mg on Wed and 5mg all other days. Encouraged to maintain a consistency of vegetables/salads.    Recheck INR in 4 weeks, 3/11    Referring cardiologist is Dr. Sandy Braden  PCP Dr. Marcella Owens  INR (no units)   Date Value   2021 2.5   12/10/2020 2.8   2020 3.2   10/22/2020 2.2   2019 1.93 (H) 09/13/2018 2.6   08/16/2018 2.6   07/19/2018 3     CLINICAL PHARMACY CONSULT: MED RECONCILIATION/REVIEW ADDENDUM    For Pharmacy Admin Tracking Only    PHSO: Yes  Total # of Interventions Recommended: 0  - Maintenance Safety Lab Monitoring #: 1  Total Interventions Accepted: 0  Time Spent (min): 15    Adriane Kim, PharmD

## 2021-03-11 ENCOUNTER — ANTI-COAG VISIT (OUTPATIENT)
Dept: PHARMACY | Age: 86
End: 2021-03-11
Payer: MEDICARE

## 2021-03-11 ENCOUNTER — APPOINTMENT (OUTPATIENT)
Dept: PHARMACY | Age: 86
End: 2021-03-11
Payer: MEDICARE

## 2021-03-11 DIAGNOSIS — I48.91 ATRIAL FIBRILLATION, UNSPECIFIED TYPE (HCC): ICD-10-CM

## 2021-03-11 LAB — INTERNATIONAL NORMALIZATION RATIO, POC: 3.4

## 2021-03-11 PROCEDURE — 99211 OFF/OP EST MAY X REQ PHY/QHP: CPT

## 2021-03-11 PROCEDURE — 85610 PROTHROMBIN TIME: CPT

## 2021-03-11 NOTE — PROGRESS NOTES
Mr. Roxanne Watson is a 80 y.o. y/o male with history of Afib   He presents today for anticoagulation monitoring and adjustment. Pertinent PMH: DM, CAD, HTN and Colon CA    Patient's wife  10/4/13. Patient Reported Findings:  Yes     No  [x]   []       Patient verifies current dosing regimen as listed   []   [x]       S/S bleeding/bruising/swelling/SOB - denies  []   [x]       Blood in urine or stool- denies   []   [x]       Procedures scheduled in the future at this time   []   [x]       Missed Dose - denies   []   [x]       Extra Dose - denies   []   [x]       Change in medications - takes tylenol as needed for knee aches---> occasional aleve 1 per day if needed ---> has been taking 4 aleve a day d/t knee pain acutely-> no changes   []   [x]       Change in health/diet/appetite-Has vegetables 1-2 x weekly. 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 --> struggling with eyesight     Having knee pain- using tylenol and cane, but says improving. Clinical Outcomes:  Yes     No  []   [x]       Major bleeding event  []   [x]       Thromboembolic event    Duration of warfarin Therapy: indefinite  INR Range:  2.0-3.0    INR is 3.4 today d/t less greens in diet. Patient plans to add greens back in 1-2 days a week. Take 2.5mg tomorrow then continue weekly dose of 7.5mg on Wed and 5mg all other days. Encouraged to maintain a consistency of vegetables/salads.    Recheck INR in 4 weeks,     Referring cardiologist is  [place]  PCP Dr. Nain Edmond  INR (no units)   Date Value   02/11/2021 2.9   01/14/2021 2.5   12/10/2020 2.8   11/19/2020 3.2   11/01/2019 1.93 (H)   09/13/2018 2.6   08/16/2018 2.6   07/19/2018 3     CLINICAL PHARMACY CONSULT: MED RECONCILIATION/REVIEW ADDENDUM    For Pharmacy Admin Tracking Only    PHSO: Yes  Total # of Interventions Recommended: 1  - Decreased Dose #: 1  - Maintenance Safety Lab Monitoring #: 1  Total Interventions Accepted: 1  Time Spent (min): [address]err[address], PharmD

## 2021-04-03 RX ORDER — GLIMEPIRIDE 1 MG/1
TABLET ORAL
Qty: 90 TABLET | Refills: 0 | Status: SHIPPED | OUTPATIENT
Start: 2021-04-03 | End: 2021-05-25 | Stop reason: SDUPTHER

## 2021-04-03 RX ORDER — METOPROLOL SUCCINATE 100 MG/1
TABLET, EXTENDED RELEASE ORAL
Qty: 45 TABLET | Refills: 0 | Status: SHIPPED | OUTPATIENT
Start: 2021-04-03 | End: 2021-05-25 | Stop reason: SDUPTHER

## 2021-04-05 ENCOUNTER — TELEPHONE (OUTPATIENT)
Dept: INTERNAL MEDICINE CLINIC | Age: 86
End: 2021-04-05

## 2021-04-05 DIAGNOSIS — E11.9 TYPE 2 DIABETES MELLITUS WITHOUT COMPLICATION, WITHOUT LONG-TERM CURRENT USE OF INSULIN (HCC): ICD-10-CM

## 2021-04-05 RX ORDER — DILTIAZEM HYDROCHLORIDE 120 MG/1
CAPSULE, COATED, EXTENDED RELEASE ORAL
Qty: 90 CAPSULE | Refills: 3 | Status: SHIPPED | OUTPATIENT
Start: 2021-04-05 | End: 2021-05-25 | Stop reason: SDUPTHER

## 2021-04-05 RX ORDER — BLOOD SUGAR DIAGNOSTIC
1 STRIP MISCELLANEOUS 4 TIMES DAILY
Qty: 300 EACH | Refills: 3 | Status: SHIPPED | OUTPATIENT
Start: 2021-04-05 | End: 2022-03-22 | Stop reason: SDUPTHER

## 2021-04-08 ENCOUNTER — ANTI-COAG VISIT (OUTPATIENT)
Dept: PHARMACY | Age: 86
End: 2021-04-08
Payer: MEDICARE

## 2021-04-08 DIAGNOSIS — I48.91 ATRIAL FIBRILLATION, UNSPECIFIED TYPE (HCC): ICD-10-CM

## 2021-04-08 LAB — INTERNATIONAL NORMALIZATION RATIO, POC: 3.8

## 2021-04-08 PROCEDURE — 85610 PROTHROMBIN TIME: CPT

## 2021-04-08 PROCEDURE — 99211 OFF/OP EST MAY X REQ PHY/QHP: CPT

## 2021-04-08 NOTE — PROGRESS NOTES
Mr. Naomie Pérez is a 80 y.o. y/o male with history of Afib   He presents today for anticoagulation monitoring and adjustment. Pertinent PMH: DM, CAD, HTN and Colon CA    Patient's wife  10/4/13. Patient Reported Findings:  Yes     No  [x]   []       Patient verifies current dosing regimen as listed   []   [x]       S/S bleeding/bruising/swelling/SOB - denies  []   [x]       Blood in urine or stool- denies   []   [x]       Procedures scheduled in the future at this time   []   [x]       Missed Dose - denies   []   [x]       Extra Dose - denies   []   [x]       Change in medications - takes tylenol as needed for knee aches---> occasional aleve 1 per day if needed ---> has been taking 4 aleve a day d/t knee pain acutely-> no changes---> some aleve recently dt knee pain    []   [x]       Change in health/diet/appetite-Has vegetables 1-2 x weekly. 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 --> struggling with eyesight     Having knee pain- using tylenol and cane, but says improving. Clinical Outcomes:  Yes     No  []   [x]       Major bleeding event  []   [x]       Thromboembolic event    Duration of warfarin Therapy: indefinite  INR Range:  2.0-3.0    INR is 3.8 today   INR has been elevated recently, will decrease dose. Hold tonight then decrease dose to 5mg daily (6.7%). Encouraged to maintain a consistency of vegetables/salads.    Recheck INR in 2 weeks,  Referring cardiologist is Dr. Ila Lujan  PCP Dr. Johana Fermin  INR (no units)   Date Value   04/08/2021 3.8   03/11/2021 3.4   02/11/2021 2.9   01/14/2021 2.5   11/01/2019 1.93 (H)   09/13/2018 2.6   08/16/2018 2.6   07/19/2018 3     CLINICAL PHARMACY CONSULT: MED RECONCILIATION/REVIEW ADDENDUM    For Pharmacy Admin Tracking Only    PHSO: Yes  Total # of Interventions Recommended: 1  - Decreased Dose #: 1  - Maintenance Safety Lab Monitoring #: 1  Total Interventions Accepted: 1  Time Spent (min): [address], PharmD

## 2021-04-22 ENCOUNTER — ANTI-COAG VISIT (OUTPATIENT)
Dept: PHARMACY | Age: 86
End: 2021-04-22
Payer: MEDICARE

## 2021-04-22 DIAGNOSIS — I48.91 ATRIAL FIBRILLATION, UNSPECIFIED TYPE (HCC): ICD-10-CM

## 2021-04-22 LAB — INTERNATIONAL NORMALIZATION RATIO, POC: 2.9

## 2021-04-22 PROCEDURE — 85610 PROTHROMBIN TIME: CPT

## 2021-04-22 PROCEDURE — 99211 OFF/OP EST MAY X REQ PHY/QHP: CPT

## 2021-04-22 NOTE — PROGRESS NOTES
Mr. Alvaro Laguna is a 80 y.o. y/o male with history of Afib   He presents today for anticoagulation monitoring and adjustment. Pertinent PMH: DM, CAD, HTN and Colon CA    Patient's wife  10/4/13. Patient Reported Findings:  Yes     No  [x]   []       Patient verifies current dosing regimen as listed   []   [x]       S/S bleeding/bruising/swelling/SOB - denies  []   [x]       Blood in urine or stool- denies   []   [x]       Procedures scheduled in the future at this time   []   [x]       Missed Dose - denies   []   [x]       Extra Dose - denies   []   [x]       Change in medications - takes tylenol as needed for knee aches---> occasional aleve 1 per day if needed ---> has been taking 4 aleve a day d/t knee pain acutely---> some aleve recently dt knee pain --> no changes  []   [x]       Change in health/diet/appetite-Has vegetables 1-2 x weekly. ---> states less greens - down to once weekly, no NVD-->greens not even once a week--> less greens lately, no other changes, no NVD--> had broccoli once in past week, states no NVD ---> no changes   [x]   []       Change in alcohol use - has an occasional beer or bourbon like usual ---> continues with alcohol --> 1 or 2 beers per day   []   [x]       Change in activity  []   [x]       Hospital admission  []   [x]       Emergency department visit  []   [x]       Other complaints  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 --> struggling with eyesight     Clinical Outcomes:  Yes     No  []   [x]       Major bleeding event  []   [x]       Thromboembolic event    Duration of warfarin Therapy: indefinite  INR Range:  2.0-3.0    INR is 2.9 today   Continue dose of 5mg daily    Encouraged to maintain a consistency of vegetables/salads.    Recheck INR in 4 weeks,     Referring cardiologist is Dr. Marquis Merlos  PCP Dr. Karime Smith  INR (no units)   Date Value   2021 3.8   2021 3.4   2021 2.9   2021 2.5   2019 1.93 (H) 09/13/2018 2.6   08/16/2018 2.6   07/19/2018 3     CLINICAL PHARMACY CONSULT: MED RECONCILIATION/REVIEW ADDENDUM    For Pharmacy Admin Tracking Only    PHSO: Yes  Total # of Interventions Recommended: 0  - Maintenance Safety Lab Monitoring #: 1  Total Interventions Accepted: 0  Time Spent (min): 15    Ruth Bustos PharmD

## 2021-05-20 ENCOUNTER — ANTI-COAG VISIT (OUTPATIENT)
Dept: PHARMACY | Age: 86
End: 2021-05-20
Payer: MEDICARE

## 2021-05-20 DIAGNOSIS — I48.91 ATRIAL FIBRILLATION, UNSPECIFIED TYPE (HCC): Primary | ICD-10-CM

## 2021-05-20 LAB — INTERNATIONAL NORMALIZATION RATIO, POC: 1.9

## 2021-05-20 PROCEDURE — 85610 PROTHROMBIN TIME: CPT

## 2021-05-20 PROCEDURE — 99211 OFF/OP EST MAY X REQ PHY/QHP: CPT

## 2021-05-20 NOTE — PROGRESS NOTES
Mr. Hortencia Gutierrez is a 80 y.o. y/o male with history of Afib   He presents today for anticoagulation monitoring and adjustment. Pertinent PMH: DM, CAD, HTN and Colon CA    Patient's wife  10/4/13. Patient Reported Findings:  Yes     No  [x]   []       Patient verifies current dosing regimen as listed   []   [x]       S/S bleeding/bruising/swelling/SOB - denies  []   [x]       Blood in urine or stool- denies   []   [x]       Procedures scheduled in the future at this time   []   [x]       Missed Dose - denies   []   [x]       Extra Dose - denies   []   [x]       Change in medications - takes tylenol as needed for knee aches---> occasional aleve 1 per day if needed ---> has been taking 4 aleve a day d/t knee pain acutely---> some aleve recently dt knee pain --> no changes  []   [x]       Change in health/diet/appetite-Has vegetables 1-2 x weekly. 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 --> struggling with eyesight     Clinical Outcomes:  Yes     No  []   [x]       Major bleeding event  []   [x]       Thromboembolic event    Duration of warfarin Therapy: indefinite  INR Range:  2.0-3.0    INR is 1.9 today   Continue dose of 5mg daily    Encouraged to maintain a consistency of vegetables/salads.    Refilled warfarin at op pharmacy  Recheck INR in 4 weeks, 6/15    Referring cardiologist is Dr. Shira Guan  PCP Dr. Jessica Bobby  INR (no units)   Date Value   2021 2.9   2021 3.8   2021 3.4   2021 2.9   11/01/2019 1.93 (H)   09/13/2018 2.6   08/16/2018 2.6   07/19/2018 3     For [address] Intervention Detail: Refill(s) Provided   Total # of Interventions Recommended: 1   Total # of Interventions Accepted: 1   Time Spent (min): 15

## 2021-05-20 NOTE — TELEPHONE ENCOUNTER
Warfarin prescription phoned into [facility] to [facility] under Dr. Isak Ruelas  Warfarin 5 mg tabs  Take 5 mg daily   90 days   2 refills

## 2021-05-25 ENCOUNTER — OFFICE VISIT (OUTPATIENT)
Dept: INTERNAL MEDICINE CLINIC | Age: 86
End: 2021-05-25
Payer: MEDICARE

## 2021-05-25 VITALS
WEIGHT: 226 LBS | SYSTOLIC BLOOD PRESSURE: 118 MMHG | BODY MASS INDEX: 32.35 KG/M2 | DIASTOLIC BLOOD PRESSURE: 72 MMHG | HEART RATE: 84 BPM | HEIGHT: 70 IN

## 2021-05-25 DIAGNOSIS — N40.1 BENIGN PROSTATIC HYPERPLASIA WITH URINARY FREQUENCY: ICD-10-CM

## 2021-05-25 DIAGNOSIS — E11.9 TYPE 2 DIABETES MELLITUS WITHOUT COMPLICATION, WITHOUT LONG-TERM CURRENT USE OF INSULIN (HCC): Primary | ICD-10-CM

## 2021-05-25 DIAGNOSIS — I10 ESSENTIAL HYPERTENSION: ICD-10-CM

## 2021-05-25 DIAGNOSIS — E78.00 PURE HYPERCHOLESTEROLEMIA: ICD-10-CM

## 2021-05-25 DIAGNOSIS — R53.82 CHRONIC FATIGUE: ICD-10-CM

## 2021-05-25 DIAGNOSIS — R35.0 BENIGN PROSTATIC HYPERPLASIA WITH URINARY FREQUENCY: ICD-10-CM

## 2021-05-25 LAB
ALBUMIN SERPL-MCNC: 4.3 G/DL (ref 3.4–5)
ANION GAP SERPL CALCULATED.3IONS-SCNC: 10 MMOL/L (ref 3–16)
BASOPHILS ABSOLUTE: 0.1 K/UL (ref 0–0.2)
BASOPHILS RELATIVE PERCENT: 1.2 %
BUN BLDV-MCNC: 17 MG/DL (ref 7–20)
CALCIUM SERPL-MCNC: 9.4 MG/DL (ref 8.3–10.6)
CHLORIDE BLD-SCNC: 103 MMOL/L (ref 99–110)
CO2: 26 MMOL/L (ref 21–32)
CREAT SERPL-MCNC: 0.8 MG/DL (ref 0.8–1.3)
EOSINOPHILS ABSOLUTE: 0.2 K/UL (ref 0–0.6)
EOSINOPHILS RELATIVE PERCENT: 3.2 %
GFR AFRICAN AMERICAN: >60
GFR NON-AFRICAN AMERICAN: >60
GLUCOSE BLD-MCNC: 189 MG/DL (ref 70–99)
HBA1C MFR BLD: 7.3 %
HCT VFR BLD CALC: 36.8 % (ref 40.5–52.5)
HEMOGLOBIN: 12.6 G/DL (ref 13.5–17.5)
LYMPHOCYTES ABSOLUTE: 0.6 K/UL (ref 1–5.1)
LYMPHOCYTES RELATIVE PERCENT: 12 %
MCH RBC QN AUTO: 32.3 PG (ref 26–34)
MCHC RBC AUTO-ENTMCNC: 34.3 G/DL (ref 31–36)
MCV RBC AUTO: 94.1 FL (ref 80–100)
MONOCYTES ABSOLUTE: 0.5 K/UL (ref 0–1.3)
MONOCYTES RELATIVE PERCENT: 9.5 %
NEUTROPHILS ABSOLUTE: 3.8 K/UL (ref 1.7–7.7)
NEUTROPHILS RELATIVE PERCENT: 74.1 %
PDW BLD-RTO: 15.5 % (ref 12.4–15.4)
PHOSPHORUS: 3 MG/DL (ref 2.5–4.9)
PLATELET # BLD: 170 K/UL (ref 135–450)
PMV BLD AUTO: 8 FL (ref 5–10.5)
POTASSIUM SERPL-SCNC: 4.2 MMOL/L (ref 3.5–5.1)
RBC # BLD: 3.91 M/UL (ref 4.2–5.9)
SODIUM BLD-SCNC: 139 MMOL/L (ref 136–145)
TSH REFLEX FT4: 3.92 UIU/ML (ref 0.27–4.2)
WBC # BLD: 5.1 K/UL (ref 4–11)

## 2021-05-25 PROCEDURE — 1123F ACP DISCUSS/DSCN MKR DOCD: CPT | Performed by: INTERNAL MEDICINE

## 2021-05-25 PROCEDURE — 83036 HEMOGLOBIN GLYCOSYLATED A1C: CPT | Performed by: INTERNAL MEDICINE

## 2021-05-25 PROCEDURE — G8417 CALC BMI ABV UP PARAM F/U: HCPCS | Performed by: INTERNAL MEDICINE

## 2021-05-25 PROCEDURE — 3051F HG A1C>EQUAL 7.0%<8.0%: CPT | Performed by: INTERNAL MEDICINE

## 2021-05-25 PROCEDURE — 1036F TOBACCO NON-USER: CPT | Performed by: INTERNAL MEDICINE

## 2021-05-25 PROCEDURE — G8427 DOCREV CUR MEDS BY ELIG CLIN: HCPCS | Performed by: INTERNAL MEDICINE

## 2021-05-25 PROCEDURE — 4040F PNEUMOC VAC/ADMIN/RCVD: CPT | Performed by: INTERNAL MEDICINE

## 2021-05-25 PROCEDURE — 99214 OFFICE O/P EST MOD 30 MIN: CPT | Performed by: INTERNAL MEDICINE

## 2021-05-25 RX ORDER — TAMSULOSIN HYDROCHLORIDE 0.4 MG/1
0.4 CAPSULE ORAL DAILY
Qty: 90 CAPSULE | Refills: 1 | Status: ON HOLD | OUTPATIENT
Start: 2021-05-25 | End: 2021-08-19 | Stop reason: HOSPADM

## 2021-05-25 RX ORDER — GLIMEPIRIDE 1 MG/1
TABLET ORAL
Qty: 90 TABLET | Refills: 1 | Status: SHIPPED | OUTPATIENT
Start: 2021-05-25 | End: 2021-11-08

## 2021-05-25 RX ORDER — SIMVASTATIN 20 MG
20 TABLET ORAL NIGHTLY
Qty: 90 TABLET | Refills: 1 | Status: SHIPPED | OUTPATIENT
Start: 2021-05-25 | End: 2021-10-15 | Stop reason: SDUPTHER

## 2021-05-25 RX ORDER — DILTIAZEM HYDROCHLORIDE 120 MG/1
CAPSULE, COATED, EXTENDED RELEASE ORAL
Qty: 90 CAPSULE | Refills: 3 | Status: SHIPPED | OUTPATIENT
Start: 2021-05-25 | End: 2022-04-04

## 2021-05-25 RX ORDER — METOPROLOL SUCCINATE 100 MG/1
TABLET, EXTENDED RELEASE ORAL
Qty: 45 TABLET | Refills: 1 | Status: ON HOLD | OUTPATIENT
Start: 2021-05-25 | End: 2021-08-19 | Stop reason: HOSPADM

## 2021-05-25 SDOH — ECONOMIC STABILITY: FOOD INSECURITY: WITHIN THE PAST 12 MONTHS, YOU WORRIED THAT YOUR FOOD WOULD RUN OUT BEFORE YOU GOT MONEY TO BUY MORE.: NEVER TRUE

## 2021-05-25 SDOH — ECONOMIC STABILITY: FOOD INSECURITY: WITHIN THE PAST 12 MONTHS, THE FOOD YOU BOUGHT JUST DIDN'T LAST AND YOU DIDN'T HAVE MONEY TO GET MORE.: NEVER TRUE

## 2021-05-25 ASSESSMENT — PATIENT HEALTH QUESTIONNAIRE - PHQ9
SUM OF ALL RESPONSES TO PHQ QUESTIONS 1-9: 0
SUM OF ALL RESPONSES TO PHQ QUESTIONS 1-9: 0
SUM OF ALL RESPONSES TO PHQ9 QUESTIONS 1 & 2: 0
1. LITTLE INTEREST OR PLEASURE IN DOING THINGS: 0
SUM OF ALL RESPONSES TO PHQ QUESTIONS 1-9: 0

## 2021-05-25 ASSESSMENT — SOCIAL DETERMINANTS OF HEALTH (SDOH): HOW HARD IS IT FOR YOU TO PAY FOR THE VERY BASICS LIKE FOOD, HOUSING, MEDICAL CARE, AND HEATING?: NOT HARD AT ALL

## 2021-05-25 NOTE — PROGRESS NOTES
Chief Complaint   Patient presents with    Hypertension    Hyperlipidemia    Benign Prostatic Hypertrophy    Diabetes      HPI:  T2DM: he reports excellent glycemic control at home. He denies hypoglycemia. He takes 1mg of glimepiride as prescribed. HTN: The patient is tolerating blood pressure medication well and taking them as directed. BP control outside of the office is well controlled. No symptoms concerning for end organ damage are present. HLD: He takes simvastatin as directed. He denies side effects. BPH: he is using Flomax. He is urinating without difficulty. [address] [facility]     He was seen in the ER on 19 for BPV. He followed up with cardiology. He completed a 7 day heart monitor study. Vertigo has resolved. Social History     Tobacco Use    Smoking status: Former Smoker     Packs/day: 1.00     Years: 30.00     Pack years: 30.00     Quit date: 1976     Years since quittin.8    Smokeless tobacco: Never Used   Vaping Use    Vaping Use: Never used   Substance Use Topics    Alcohol use: Yes     Alcohol/week: 10.0 standard drinks     Types: 10 Cans of beer per week    Drug use: No        ROS:  Positive for fatigue  CV: Neg for chest pain  RESP: neg for dyspnea   GI: occasional constipation  : no urinary problems         EXAM:  /72   Pulse 84   Ht 5' 10\" (1.778 m)   Wt 226 lb (102.5 kg)   BMI 32.43 kg/m²    GEN: WN/WD, NAD  CV: irregular rhythm, normal rate  Resp: normal effort, clear auscultation bilaterally  Abd: soft, nontender to palpation.      Lab Results   Component Value Date    LABA1C 6.6 2019               Lab Results   Component Value Date    CREATININE 0.7 (L) 2019    BUN 11 2019     2019    K 4.4 2019     2019    CO2 28 2019     Lab Results   Component Value Date    CHOL 91 12/10/2020    CHOL 96 2019    CHOL 111 2018     Lab Results   Component Value Date    TRIG 75 12/10/2020    TRIG

## 2021-06-15 ENCOUNTER — ANTI-COAG VISIT (OUTPATIENT)
Dept: PHARMACY | Age: 86
End: 2021-06-15
Payer: MEDICARE

## 2021-06-15 ENCOUNTER — OFFICE VISIT (OUTPATIENT)
Dept: CARDIOLOGY CLINIC | Age: 86
End: 2021-06-15
Payer: MEDICARE

## 2021-06-15 VITALS
WEIGHT: 225 LBS | SYSTOLIC BLOOD PRESSURE: 122 MMHG | HEART RATE: 91 BPM | DIASTOLIC BLOOD PRESSURE: 60 MMHG | HEIGHT: 70 IN | BODY MASS INDEX: 32.21 KG/M2 | OXYGEN SATURATION: 96 %

## 2021-06-15 DIAGNOSIS — E11.9 TYPE 2 DIABETES MELLITUS WITHOUT COMPLICATION, WITHOUT LONG-TERM CURRENT USE OF INSULIN (HCC): ICD-10-CM

## 2021-06-15 DIAGNOSIS — I77.9 BILATERAL CAROTID ARTERY DISEASE, UNSPECIFIED TYPE (HCC): ICD-10-CM

## 2021-06-15 DIAGNOSIS — I48.91 ATRIAL FIBRILLATION, UNSPECIFIED TYPE (HCC): Primary | ICD-10-CM

## 2021-06-15 DIAGNOSIS — I10 ESSENTIAL HYPERTENSION: ICD-10-CM

## 2021-06-15 DIAGNOSIS — I25.10 CORONARY ARTERY DISEASE INVOLVING NATIVE HEART WITHOUT ANGINA PECTORIS, UNSPECIFIED VESSEL OR LESION TYPE: Primary | ICD-10-CM

## 2021-06-15 DIAGNOSIS — I48.91 ATRIAL FIBRILLATION, UNSPECIFIED TYPE (HCC): ICD-10-CM

## 2021-06-15 DIAGNOSIS — E78.5 HYPERLIPIDEMIA, UNSPECIFIED HYPERLIPIDEMIA TYPE: ICD-10-CM

## 2021-06-15 LAB — INTERNATIONAL NORMALIZATION RATIO, POC: 2

## 2021-06-15 PROCEDURE — G8417 CALC BMI ABV UP PARAM F/U: HCPCS | Performed by: INTERNAL MEDICINE

## 2021-06-15 PROCEDURE — G8427 DOCREV CUR MEDS BY ELIG CLIN: HCPCS | Performed by: INTERNAL MEDICINE

## 2021-06-15 PROCEDURE — 1036F TOBACCO NON-USER: CPT | Performed by: INTERNAL MEDICINE

## 2021-06-15 PROCEDURE — 99211 OFF/OP EST MAY X REQ PHY/QHP: CPT

## 2021-06-15 PROCEDURE — 99214 OFFICE O/P EST MOD 30 MIN: CPT | Performed by: INTERNAL MEDICINE

## 2021-06-15 PROCEDURE — 4040F PNEUMOC VAC/ADMIN/RCVD: CPT | Performed by: INTERNAL MEDICINE

## 2021-06-15 PROCEDURE — 85610 PROTHROMBIN TIME: CPT

## 2021-06-15 PROCEDURE — 3051F HG A1C>EQUAL 7.0%<8.0%: CPT | Performed by: INTERNAL MEDICINE

## 2021-06-15 PROCEDURE — 1123F ACP DISCUSS/DSCN MKR DOCD: CPT | Performed by: INTERNAL MEDICINE

## 2021-06-15 NOTE — PROGRESS NOTES
Mr. Katelynn Shipman is a 80 y.o. y/o male with history of Afib   He presents today for anticoagulation monitoring and adjustment. Pertinent PMH: DM, CAD, HTN and Colon CA    Patient's wife  10/4/13. Patient Reported Findings:  Yes     No  [x]   []       Patient verifies current dosing regimen as listed   []   [x]       S/S bleeding/bruising/swelling/SOB - denies  []   [x]       Blood in urine or stool- denies   []   [x]       Procedures scheduled in the future at this time   []   [x]       Missed Dose - denies   []   [x]       Extra Dose - denies   []   [x]       Change in medications - takes tylenol as needed for knee aches---> occasional aleve 1 per day if needed ---> has been taking 4 aleve a day d/t knee pain acutely---> some aleve recently dt knee pain --> no changes  []   [x]       Change in health/diet/appetite-Has vegetables 1-2 x weekly. ---> states less greens - down to once weekly, no NVD-->greens not even once a week--> less greens lately, no other changes, no NVD--> had broccoli once in past week, states no NVD ---> no changes---> had more greens recently    []   [x]       Change in alcohol use - has an occasional beer or bourbon like usual ---> continues with alcohol --> 1 or 2 beers per day   []   [x]       Change in activity  []   [x]       Hospital admission  []   [x]       Emergency department visit  []   [x]       Other complaints  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 --> struggling with eyesight     Clinical Outcomes:  Yes     No  []   [x]       Major bleeding event  []   [x]       Thromboembolic event    Duration of warfarin Therapy: indefinite  INR Range:  2.0-3.0    INR is 2.0 today   Continue dose of 5mg daily. Encouraged to maintain a consistency of vegetables/salads.    Recheck INR in 4 weeks, 7/15-  work best for son     Referring cardiologist is Dr. Ila Lujan  PCP Dr. Johana Fermin  INR (no units)   Date Value   06/15/2021 2.0   2021 1.9 04/22/2021 2.9   04/08/2021 3.8   11/01/2019 1.93 (H)   09/13/2018 2.6   08/16/2018 2.6   07/19/2018 3     For Pharmacy Admin Tracking Only     Total # of Interventions Recommended: 0   Total # of Interventions Accepted: 0   Time Spent (min): 15

## 2021-06-15 NOTE — PROGRESS NOTES
[address]   Cardiac Follow up     Referring Provider:  Long Ovalle MD     Chief Complaint   Patient presents with    Atrial Fibrillation    Hypertension    Hyperlipidemia      History of Present Illness:  Mr. Libia Camacho is a 80 y.o. male with a history of  CAD (JURGEN to LAD in 2005), hypertension, atrial fib, hyperlipidemia. He did have carotid surgery in Feb 2014. States he stopped smoking in the \"70's. Today, he states he is doing ok. Uses a  walker for balance assist. Would like to go back to cardiac rehab, exercises at home. Denies exertional chest pain, FATIMA/PND, palpitations, light-headedness, edema. He is not symptomatic with afib. Past Medical History:   has a past medical history of Acute MI ([facility] [address] Arthritis, Atrial fibrillation ([facility] [address] CAD (coronary artery disease), Cancer of colon ([facility] [address] Glaucoma, Gout, unspecified, Hypertension, Hypertrophy of prostate without urinary obstruction and other lower urinary tract symptoms (LUTS), Other and unspecified hyperlipidemia, and Type II or unspecified type diabetes mellitus without mention of complication, uncontrolled. Surgical History:   has a past surgical history that includes Colon surgery; Cataract removal; Coronary angioplasty with stent; other surgical history; Revision total knee arthroplasty (Right, 08/17/2011); Abdomen surgery (1992); Colonoscopy; Carotid endarterectomy (Left, 2/24/13); Cystocopy (3-27-14); joint replacement (2010); Eye surgery (Left, 03/02/2018); Eye surgery (Left, 04/13/2018); and eye surgery (Left, 2009). Social History:   reports that he quit smoking about 44 years ago. He has a 30.00 pack-year smoking history. He has never used smokeless tobacco. He reports current alcohol use of about 10.0 standard drinks of alcohol per week. He reports that he does not use drugs.      Family History:  family history includes Heart Disease in his mother; High Blood Pressure in his father and mother; Stroke in his mother. Current Outpatient Medications   Medication Sig Dispense Refill    simvastatin (ZOCOR) 20 MG tablet Take 1 tablet by mouth nightly 90 tablet 1    tamsulosin (FLOMAX) 0.4 MG capsule Take 1 capsule by mouth daily 90 capsule 1    metoprolol succinate (TOPROL XL) 100 MG extended release tablet Take 1/2 tablet every day 45 tablet 1    glimepiride (AMARYL) 1 MG tablet Take 1 tablet daily 90 tablet 1    dilTIAZem (CARDIZEM CD) 120 MG extended release capsule Take 1 capsule daily 90 capsule 3    ONETOUCH VERIO strip 1 each by In Vitro route 4 times daily Test 2-4 times a day E11.9 300 each 3    prednisoLONE acetate (PRED FORTE) 1 % ophthalmic suspension Apply 1 drop to eye 4 times daily      Lancets Thin MISC 2-4 Devices by Does not apply route daily 2-4 leilani daily 300 each 3    bimatoprost (LUMIGAN) 0.01 % SOLN ophthalmic drops Place 1 drop into both eyes nightly      dorzolamide (TRUSOPT) 2 % ophthalmic solution Place 1 drop into both eyes 2 times daily       warfarin (COUMADIN) 5 MG tablet Take 7.5 mg (one and a half tablets) coumadin every evening until directed otherwise by Dr. Martha Craig (Patient taking differently: Take 5 mg by mouth See Admin Instructions Take 7.5mg on Sun & Wed and 5mg all other days as directed by [facility]) 90 tablet 3    brimonidine-timolol (COMBIGAN) 0.2-0.5 % ophthalmic solution Place 1 drop into both eyes every 12 hours. No current facility-administered medications for this visit. Allergies:  Hydrochlorothiazide, Metformin and related, and Morphine     Review of Systems:   · Constitutional: there has been no unanticipated weight loss. There's been no change in energy level, sleep pattern, or activity level. · Eyes: decreased vision-macular degeneration   · ENT: No Headaches, hearing loss or vertigo. No mouth sores or sore throat. · Cardiovascular: Reviewed in HPI  · Respiratory: No cough or wheezing, no sputum production. No hematemesis.     · Gastrointestinal: No abdominal pain, appetite loss, blood in stools. No change in bowel or bladder habits. · Genitourinary: No dysuria, trouble voiding, or hematuria. · Musculoskeletal:  No gait disturbance, weakness. Right knee pain. · Integumentary: No rash or pruritis. · Neurological: No headache, diplopia, change in muscle strength, numbness or tingling. No change in gait, balance, coordination, mood, affect, memory, mentation, behavior. · Psychiatric: No anxiety, no depression. · Endocrine: No malaise, fatigue or temperature intolerance. No excessive thirst, fluid intake, or urination. No tremor. · Hematologic/Lymphatic: No abnormal bruising or bleeding, blood clots or swollen lymph nodes. · Allergic/Immunologic: No nasal congestion or hives. Physical Examination:    Vitals:    06/15/21 0839   BP: 122/60   Pulse: 91   SpO2: 96%      Weight 223 lbs  Constitutional and General Appearance: NAD, well nourished   Skin:good turgor,intact without lesions  HEENT: EOMI ,normal  Neck:no JVD  Respiratory:  · Normal excursion and expansion without use of accessory muscles  · Resp Auscultation: Normal breath sounds without dullness  Cardiovascular:  · The apical impulses not displaced  · Heart tones are crisp and normal  · Cervical veins are not engorged  · The carotid upstroke is normal in amplitude and contour without delay or bruit  · Normal heart tone, irregular rhythm. · Peripheral pulses are symmetrical and full  · There is no clubbing, cyanosis of the extremities. · No edema  · Femoral Arteries: 2+ and equal  · Pedal Pulses: 2+ and equal   Abdomen:  · No masses or tenderness  · Liver/Spleen: No Abnormalities Noted  Neurological/Psychiatric:  · Alert and oriented in all spheres  · Moves all extremities well  · Exhibits normal gait balance and coordination  · No abnormalities of mood, affect, memory, mentation, or behavior are noted    Assessment/Plan:    1. Atrial fibrillation: Controlled. Irregular. Remains on Coumadin. INR managed by [facility] clinic. 2. CAD (coronary artery disease): Stable. No anginal symptoms. 12/05 cath> JURGEN to LAD. 3. Hypertension:  Stable. Blood pressure 122/60, pulse 91, height 5' 10\" (1.778 m), weight 225 lb (102.1 kg), SpO2 96 %. 4. Other and unspecified hyperlipidemia: Checked by PCP. Controlled - Zocor 20mg    5. Carotid artery disease: s/p left CEA 2/2014. Managed by Dr. Enrique Claros. 12/18/15 Dopplers> 1-15% ja. Currently on Zocor. 6.  DM w/o complication type II: Followed by PCP. Plan:   Regla Willams has a stable cardiac status. Cardiac test and lab results personally reviewed by me during this office visit and discussed. No med changes. He would like to return to cardiac rehab. Continue risk factor modifications. Call for any change in symptoms. Return for regular follow up in 6 months. I appreciate the opportunity of cooperating in the care of this individual.    Yelena Kaur. Joseluis Joyce M.D., [address] [facility]    Patient's problem list, medications, allergies, past medical, surgical, social and family histories were reviewed and updated as appropriate. Scribe's attestation: This note was scribed in the presence of Dr. Joseluis Joyce MD, by Misty Martines RN. The scribe's documentation has been prepared under my direction and personally reviewed by me in its entirety. I confirm that the note above accurately reflects all work, treatment, procedures, and medical decision making performed by me.

## 2021-07-15 ENCOUNTER — TELEPHONE (OUTPATIENT)
Dept: PHARMACY | Age: 86
End: 2021-07-15

## 2021-07-19 ENCOUNTER — ANTI-COAG VISIT (OUTPATIENT)
Dept: PHARMACY | Age: 86
End: 2021-07-19
Payer: MEDICARE

## 2021-07-19 DIAGNOSIS — I48.91 ATRIAL FIBRILLATION, UNSPECIFIED TYPE (HCC): Primary | ICD-10-CM

## 2021-07-19 LAB — INTERNATIONAL NORMALIZATION RATIO, POC: 1.9

## 2021-07-19 PROCEDURE — 99211 OFF/OP EST MAY X REQ PHY/QHP: CPT

## 2021-07-19 PROCEDURE — 85610 PROTHROMBIN TIME: CPT

## 2021-07-19 NOTE — PROGRESS NOTES
Mr. Lissette Comer is a 80 y.o. y/o male with history of Afib   He presents today for anticoagulation monitoring and adjustment. Pertinent PMH: DM, CAD, HTN and Colon CA    Patient's wife  10/4/13. Patient Reported Findings:  Yes     No  [x]   []       Patient verifies current dosing regimen as listed   []   [x]       S/S bleeding/bruising/swelling/SOB - denies  []   [x]       Blood in urine or stool- denies   []   [x]       Procedures scheduled in the future at this time   []   [x]       Missed Dose - denies   []   [x]       Extra Dose - denies   []   [x]       Change in medications - takes tylenol as needed for knee aches---> occasional aleve 1 per day if needed ---> has been taking 4 aleve a day d/t knee pain acutely---> some aleve recently dt knee pain --> no changes  [x]   []       Change in health/diet/appetite-Has vegetables 1-2 x weekly. ---> states less greens - down to once weekly, no NVD-->greens not even once a week--> less greens lately, no other changes, no NVD--> had broccoli once in past week, states no NVD ---> no changes---> had more greens recently --> states that continues with more vit k    []   [x]       Change in alcohol use - has an occasional beer or bourbon like usual ---> continues with alcohol --> 1 or 2 beers per day   []   [x]       Change in activity  []   [x]       Hospital admission  []   [x]       Emergency department visit  []   [x]       Other complaints  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 --> struggling with eyesight     Clinical Outcomes:  Yes     No  []   [x]       Major bleeding event  []   [x]       Thromboembolic event    Duration of warfarin Therapy: indefinite  INR Range:  2.0-3.0    INR is 1.9 today   Continue dose of 5mg daily. Encouraged to maintain a consistency of vegetables/salads.    Recheck INR in 4 weeks,      Referring cardiologist is Dr. Stephanie Anderson  PCP Dr. Boris Joyce  INR (no units)   Date Value   06/15/2021 2.0 05/20/2021 1.9   04/22/2021 2.9   04/08/2021 3.8   11/01/2019 1.93 (H)   09/13/2018 2.6   08/16/2018 2.6   07/19/2018 3     For Pharmacy Admin Tracking Only     Total # of Interventions Recommended: 0   Total # of Interventions Accepted: 0   Time Spent (min): 15

## 2021-08-14 ENCOUNTER — APPOINTMENT (OUTPATIENT)
Dept: GENERAL RADIOLOGY | Age: 86
DRG: 177 | End: 2021-08-14
Payer: MEDICARE

## 2021-08-14 ENCOUNTER — APPOINTMENT (OUTPATIENT)
Dept: CT IMAGING | Age: 86
DRG: 177 | End: 2021-08-14
Payer: MEDICARE

## 2021-08-14 ENCOUNTER — HOSPITAL ENCOUNTER (INPATIENT)
Age: 86
LOS: 5 days | Discharge: HOME HEALTH | DRG: 177 | End: 2021-08-19
Attending: EMERGENCY MEDICINE | Admitting: EMERGENCY MEDICINE
Payer: MEDICARE

## 2021-08-14 DIAGNOSIS — R53.1 GENERAL WEAKNESS: ICD-10-CM

## 2021-08-14 DIAGNOSIS — R79.1 SUPRATHERAPEUTIC INR: ICD-10-CM

## 2021-08-14 DIAGNOSIS — J18.9 PNEUMONIA DUE TO INFECTIOUS ORGANISM, UNSPECIFIED LATERALITY, UNSPECIFIED PART OF LUNG: ICD-10-CM

## 2021-08-14 DIAGNOSIS — I25.10 CORONARY ARTERY DISEASE INVOLVING NATIVE HEART WITHOUT ANGINA PECTORIS, UNSPECIFIED VESSEL OR LESION TYPE: ICD-10-CM

## 2021-08-14 DIAGNOSIS — J96.01 ACUTE RESPIRATORY FAILURE WITH HYPOXIA (HCC): Primary | ICD-10-CM

## 2021-08-14 PROBLEM — J96.00 ACUTE RESPIRATORY FAILURE (HCC): Status: ACTIVE | Noted: 2021-08-14

## 2021-08-14 LAB
A/G RATIO: 1.2 (ref 1.1–2.2)
ALBUMIN SERPL-MCNC: 4.1 G/DL (ref 3.4–5)
ALP BLD-CCNC: 83 U/L (ref 40–129)
ALT SERPL-CCNC: 10 U/L (ref 10–40)
ANION GAP SERPL CALCULATED.3IONS-SCNC: 9 MMOL/L (ref 3–16)
AST SERPL-CCNC: 16 U/L (ref 15–37)
BACTERIA: ABNORMAL /HPF
BASOPHILS ABSOLUTE: 0.4 K/UL (ref 0–0.2)
BASOPHILS RELATIVE PERCENT: 3.9 %
BILIRUB SERPL-MCNC: 0.9 MG/DL (ref 0–1)
BILIRUBIN URINE: NEGATIVE
BLOOD, URINE: ABNORMAL
BUN BLDV-MCNC: 16 MG/DL (ref 7–20)
CALCIUM SERPL-MCNC: 9.4 MG/DL (ref 8.3–10.6)
CHLORIDE BLD-SCNC: 104 MMOL/L (ref 99–110)
CLARITY: ABNORMAL
CO2: 24 MMOL/L (ref 21–32)
COLOR: YELLOW
CREAT SERPL-MCNC: 0.8 MG/DL (ref 0.8–1.3)
EOSINOPHILS ABSOLUTE: 0 K/UL (ref 0–0.6)
EOSINOPHILS RELATIVE PERCENT: 0.5 %
EPITHELIAL CELLS, UA: 2 /HPF (ref 0–5)
FERRITIN: 217.4 NG/ML (ref 30–400)
GFR AFRICAN AMERICAN: >60
GFR NON-AFRICAN AMERICAN: >60
GLOBULIN: 3.4 G/DL
GLUCOSE BLD-MCNC: 239 MG/DL (ref 70–99)
GLUCOSE BLD-MCNC: 260 MG/DL (ref 70–99)
GLUCOSE BLD-MCNC: 320 MG/DL (ref 70–99)
GLUCOSE URINE: >=1000 MG/DL
HCT VFR BLD CALC: 39.7 % (ref 40.5–52.5)
HEMOGLOBIN: 13.5 G/DL (ref 13.5–17.5)
HYALINE CASTS: 5 /LPF (ref 0–8)
INR BLD: 5.81 (ref 0.88–1.12)
KETONES, URINE: NEGATIVE MG/DL
LACTATE DEHYDROGENASE: 234 U/L (ref 100–190)
LACTIC ACID, SEPSIS: 2.1 MMOL/L (ref 0.4–1.9)
LEUKOCYTE ESTERASE, URINE: ABNORMAL
LYMPHOCYTES ABSOLUTE: 0.2 K/UL (ref 1–5.1)
LYMPHOCYTES RELATIVE PERCENT: 2.1 %
MCH RBC QN AUTO: 32.1 PG (ref 26–34)
MCHC RBC AUTO-ENTMCNC: 34.1 G/DL (ref 31–36)
MCV RBC AUTO: 94.1 FL (ref 80–100)
MICROSCOPIC EXAMINATION: YES
MONOCYTES ABSOLUTE: 0.5 K/UL (ref 0–1.3)
MONOCYTES RELATIVE PERCENT: 4.6 %
NEUTROPHILS ABSOLUTE: 9.5 K/UL (ref 1.7–7.7)
NEUTROPHILS RELATIVE PERCENT: 88.9 %
NITRITE, URINE: POSITIVE
PDW BLD-RTO: 14.7 % (ref 12.4–15.4)
PERFORMED ON: ABNORMAL
PERFORMED ON: ABNORMAL
PH UA: 7.5 (ref 5–8)
PLATELET # BLD: 157 K/UL (ref 135–450)
PMV BLD AUTO: 7.6 FL (ref 5–10.5)
POTASSIUM REFLEX MAGNESIUM: 4.2 MMOL/L (ref 3.5–5.1)
PRO-BNP: 643 PG/ML (ref 0–449)
PROCALCITONIN: 0.18 NG/ML (ref 0–0.15)
PROTEIN UA: 30 MG/DL
PROTHROMBIN TIME: 70.6 SEC (ref 9.9–12.7)
RBC # BLD: 4.22 M/UL (ref 4.2–5.9)
RBC UA: ABNORMAL /HPF (ref 0–4)
SODIUM BLD-SCNC: 137 MMOL/L (ref 136–145)
SPECIFIC GRAVITY UA: 1.02 (ref 1–1.03)
TOTAL CK: 77 U/L (ref 39–308)
TOTAL PROTEIN: 7.5 G/DL (ref 6.4–8.2)
TROPONIN: <0.01 NG/ML
TROPONIN: <0.01 NG/ML
URINE REFLEX TO CULTURE: YES
URINE TYPE: ABNORMAL
UROBILINOGEN, URINE: 0.2 E.U./DL
WBC # BLD: 10.6 K/UL (ref 4–11)
WBC UA: 767 /HPF (ref 0–5)

## 2021-08-14 PROCEDURE — 2580000003 HC RX 258: Performed by: PHYSICIAN ASSISTANT

## 2021-08-14 PROCEDURE — 82728 ASSAY OF FERRITIN: CPT

## 2021-08-14 PROCEDURE — 51702 INSERT TEMP BLADDER CATH: CPT

## 2021-08-14 PROCEDURE — 6370000000 HC RX 637 (ALT 250 FOR IP): Performed by: HOSPITALIST

## 2021-08-14 PROCEDURE — 85610 PROTHROMBIN TIME: CPT

## 2021-08-14 PROCEDURE — 87040 BLOOD CULTURE FOR BACTERIA: CPT

## 2021-08-14 PROCEDURE — 99285 EMERGENCY DEPT VISIT HI MDM: CPT

## 2021-08-14 PROCEDURE — 81001 URINALYSIS AUTO W/SCOPE: CPT

## 2021-08-14 PROCEDURE — 6370000000 HC RX 637 (ALT 250 FOR IP): Performed by: PHYSICIAN ASSISTANT

## 2021-08-14 PROCEDURE — 94761 N-INVAS EAR/PLS OXIMETRY MLT: CPT

## 2021-08-14 PROCEDURE — 80053 COMPREHEN METABOLIC PANEL: CPT

## 2021-08-14 PROCEDURE — 83615 LACTATE (LD) (LDH) ENZYME: CPT

## 2021-08-14 PROCEDURE — 94640 AIRWAY INHALATION TREATMENT: CPT

## 2021-08-14 PROCEDURE — 72125 CT NECK SPINE W/O DYE: CPT

## 2021-08-14 PROCEDURE — U0005 INFEC AGEN DETEC AMPLI PROBE: HCPCS

## 2021-08-14 PROCEDURE — 83605 ASSAY OF LACTIC ACID: CPT

## 2021-08-14 PROCEDURE — 6360000002 HC RX W HCPCS: Performed by: PHYSICIAN ASSISTANT

## 2021-08-14 PROCEDURE — 83880 ASSAY OF NATRIURETIC PEPTIDE: CPT

## 2021-08-14 PROCEDURE — 93005 ELECTROCARDIOGRAM TRACING: CPT | Performed by: EMERGENCY MEDICINE

## 2021-08-14 PROCEDURE — 82550 ASSAY OF CK (CPK): CPT

## 2021-08-14 PROCEDURE — 6360000002 HC RX W HCPCS: Performed by: HOSPITALIST

## 2021-08-14 PROCEDURE — 2700000000 HC OXYGEN THERAPY PER DAY

## 2021-08-14 PROCEDURE — 84484 ASSAY OF TROPONIN QUANT: CPT

## 2021-08-14 PROCEDURE — 2580000003 HC RX 258: Performed by: HOSPITALIST

## 2021-08-14 PROCEDURE — 70450 CT HEAD/BRAIN W/O DYE: CPT

## 2021-08-14 PROCEDURE — 87086 URINE CULTURE/COLONY COUNT: CPT

## 2021-08-14 PROCEDURE — 2500000003 HC RX 250 WO HCPCS: Performed by: INTERNAL MEDICINE

## 2021-08-14 PROCEDURE — 1200000000 HC SEMI PRIVATE

## 2021-08-14 PROCEDURE — 2580000003 HC RX 258: Performed by: INTERNAL MEDICINE

## 2021-08-14 PROCEDURE — 90471 IMMUNIZATION ADMIN: CPT | Performed by: PHYSICIAN ASSISTANT

## 2021-08-14 PROCEDURE — 73590 X-RAY EXAM OF LOWER LEG: CPT

## 2021-08-14 PROCEDURE — 85025 COMPLETE CBC W/AUTO DIFF WBC: CPT

## 2021-08-14 PROCEDURE — 90715 TDAP VACCINE 7 YRS/> IM: CPT | Performed by: PHYSICIAN ASSISTANT

## 2021-08-14 PROCEDURE — 84145 PROCALCITONIN (PCT): CPT

## 2021-08-14 PROCEDURE — 2060000000 HC ICU INTERMEDIATE R&B

## 2021-08-14 PROCEDURE — 84443 ASSAY THYROID STIM HORMONE: CPT

## 2021-08-14 PROCEDURE — 71045 X-RAY EXAM CHEST 1 VIEW: CPT

## 2021-08-14 PROCEDURE — U0003 INFECTIOUS AGENT DETECTION BY NUCLEIC ACID (DNA OR RNA); SEVERE ACUTE RESPIRATORY SYNDROME CORONAVIRUS 2 (SARS-COV-2) (CORONAVIRUS DISEASE [COVID-19]), AMPLIFIED PROBE TECHNIQUE, MAKING USE OF HIGH THROUGHPUT TECHNOLOGIES AS DESCRIBED BY CMS-2020-01-R: HCPCS

## 2021-08-14 RX ORDER — SODIUM CHLORIDE 9 MG/ML
INJECTION, SOLUTION INTRAVENOUS CONTINUOUS
Status: DISCONTINUED | OUTPATIENT
Start: 2021-08-14 | End: 2021-08-14

## 2021-08-14 RX ORDER — DORZOLAMIDE HCL 20 MG/ML
1 SOLUTION/ DROPS OPHTHALMIC 2 TIMES DAILY
Status: DISCONTINUED | OUTPATIENT
Start: 2021-08-14 | End: 2021-08-15

## 2021-08-14 RX ORDER — ACETAMINOPHEN 325 MG/1
650 TABLET ORAL EVERY 6 HOURS PRN
Status: DISCONTINUED | OUTPATIENT
Start: 2021-08-14 | End: 2021-08-19 | Stop reason: HOSPADM

## 2021-08-14 RX ORDER — POLYETHYLENE GLYCOL 3350 17 G/17G
17 POWDER, FOR SOLUTION ORAL DAILY PRN
Status: DISCONTINUED | OUTPATIENT
Start: 2021-08-14 | End: 2021-08-19 | Stop reason: HOSPADM

## 2021-08-14 RX ORDER — PREDNISOLONE ACETATE 10 MG/ML
1 SUSPENSION/ DROPS OPHTHALMIC EVERY 6 HOURS SCHEDULED
Status: DISCONTINUED | OUTPATIENT
Start: 2021-08-15 | End: 2021-08-15

## 2021-08-14 RX ORDER — DILTIAZEM HYDROCHLORIDE 120 MG/1
120 CAPSULE, COATED, EXTENDED RELEASE ORAL DAILY
Status: DISCONTINUED | OUTPATIENT
Start: 2021-08-14 | End: 2021-08-14

## 2021-08-14 RX ORDER — ONDANSETRON 4 MG/1
4 TABLET, ORALLY DISINTEGRATING ORAL EVERY 8 HOURS PRN
Status: DISCONTINUED | OUTPATIENT
Start: 2021-08-14 | End: 2021-08-14

## 2021-08-14 RX ORDER — DILTIAZEM HYDROCHLORIDE 5 MG/ML
10 INJECTION INTRAVENOUS ONCE
Status: COMPLETED | OUTPATIENT
Start: 2021-08-14 | End: 2021-08-14

## 2021-08-14 RX ORDER — ACETAMINOPHEN 650 MG/1
650 SUPPOSITORY RECTAL EVERY 6 HOURS PRN
Status: DISCONTINUED | OUTPATIENT
Start: 2021-08-14 | End: 2021-08-19 | Stop reason: HOSPADM

## 2021-08-14 RX ORDER — SODIUM CHLORIDE 9 MG/ML
25 INJECTION, SOLUTION INTRAVENOUS PRN
Status: DISCONTINUED | OUTPATIENT
Start: 2021-08-14 | End: 2021-08-14

## 2021-08-14 RX ORDER — FUROSEMIDE 10 MG/ML
INJECTION INTRAMUSCULAR; INTRAVENOUS
Status: DISPENSED
Start: 2021-08-14 | End: 2021-08-15

## 2021-08-14 RX ORDER — DEXTROSE MONOHYDRATE 25 G/50ML
12.5 INJECTION, SOLUTION INTRAVENOUS PRN
Status: DISCONTINUED | OUTPATIENT
Start: 2021-08-14 | End: 2021-08-19 | Stop reason: HOSPADM

## 2021-08-14 RX ORDER — BRIMONIDINE TARTRATE, TIMOLOL MALEATE 2; 5 MG/ML; MG/ML
1 SOLUTION/ DROPS OPHTHALMIC EVERY 12 HOURS
Status: DISCONTINUED | OUTPATIENT
Start: 2021-08-14 | End: 2021-08-14

## 2021-08-14 RX ORDER — METOPROLOL SUCCINATE 50 MG/1
50 TABLET, EXTENDED RELEASE ORAL DAILY
Status: DISCONTINUED | OUTPATIENT
Start: 2021-08-15 | End: 2021-08-17

## 2021-08-14 RX ORDER — DEXTROSE MONOHYDRATE 50 MG/ML
100 INJECTION, SOLUTION INTRAVENOUS PRN
Status: DISCONTINUED | OUTPATIENT
Start: 2021-08-14 | End: 2021-08-19 | Stop reason: HOSPADM

## 2021-08-14 RX ORDER — SODIUM CHLORIDE 0.9 % (FLUSH) 0.9 %
5-40 SYRINGE (ML) INJECTION PRN
Status: DISCONTINUED | OUTPATIENT
Start: 2021-08-14 | End: 2021-08-19 | Stop reason: HOSPADM

## 2021-08-14 RX ORDER — ONDANSETRON 2 MG/ML
4 INJECTION INTRAMUSCULAR; INTRAVENOUS EVERY 6 HOURS PRN
Status: DISCONTINUED | OUTPATIENT
Start: 2021-08-14 | End: 2021-08-14

## 2021-08-14 RX ORDER — TAMSULOSIN HYDROCHLORIDE 0.4 MG/1
0.4 CAPSULE ORAL DAILY
Status: DISCONTINUED | OUTPATIENT
Start: 2021-08-15 | End: 2021-08-19 | Stop reason: HOSPADM

## 2021-08-14 RX ORDER — ATORVASTATIN CALCIUM 10 MG/1
10 TABLET, FILM COATED ORAL NIGHTLY
Status: DISCONTINUED | OUTPATIENT
Start: 2021-08-14 | End: 2021-08-19 | Stop reason: HOSPADM

## 2021-08-14 RX ORDER — 0.9 % SODIUM CHLORIDE 0.9 %
1000 INTRAVENOUS SOLUTION INTRAVENOUS ONCE
Status: COMPLETED | OUTPATIENT
Start: 2021-08-14 | End: 2021-08-14

## 2021-08-14 RX ORDER — NICOTINE POLACRILEX 4 MG
15 LOZENGE BUCCAL PRN
Status: DISCONTINUED | OUTPATIENT
Start: 2021-08-14 | End: 2021-08-19 | Stop reason: HOSPADM

## 2021-08-14 RX ORDER — FUROSEMIDE 10 MG/ML
40 INJECTION INTRAMUSCULAR; INTRAVENOUS ONCE
Status: COMPLETED | OUTPATIENT
Start: 2021-08-14 | End: 2021-08-14

## 2021-08-14 RX ORDER — ALBUTEROL SULFATE 90 UG/1
2 AEROSOL, METERED RESPIRATORY (INHALATION) 4 TIMES DAILY
Status: DISCONTINUED | OUTPATIENT
Start: 2021-08-14 | End: 2021-08-16

## 2021-08-14 RX ORDER — BRIMONIDINE TARTRATE 2 MG/ML
1 SOLUTION/ DROPS OPHTHALMIC 2 TIMES DAILY
Status: DISCONTINUED | OUTPATIENT
Start: 2021-08-14 | End: 2021-08-14

## 2021-08-14 RX ORDER — BRIMONIDINE TARTRATE, TIMOLOL MALEATE 2; 5 MG/ML; MG/ML
1 SOLUTION/ DROPS OPHTHALMIC EVERY 12 HOURS
Status: DISCONTINUED | OUTPATIENT
Start: 2021-08-14 | End: 2021-08-15

## 2021-08-14 RX ORDER — SODIUM CHLORIDE 0.9 % (FLUSH) 0.9 %
5-40 SYRINGE (ML) INJECTION EVERY 12 HOURS SCHEDULED
Status: DISCONTINUED | OUTPATIENT
Start: 2021-08-14 | End: 2021-08-19 | Stop reason: HOSPADM

## 2021-08-14 RX ORDER — TIMOLOL MALEATE 5 MG/ML
1 SOLUTION/ DROPS OPHTHALMIC 2 TIMES DAILY
Status: DISCONTINUED | OUTPATIENT
Start: 2021-08-14 | End: 2021-08-14

## 2021-08-14 RX ORDER — IPRATROPIUM BROMIDE AND ALBUTEROL SULFATE 2.5; .5 MG/3ML; MG/3ML
1 SOLUTION RESPIRATORY (INHALATION)
Status: DISCONTINUED | OUTPATIENT
Start: 2021-08-14 | End: 2021-08-14

## 2021-08-14 RX ORDER — INSULIN LISPRO 100 [IU]/ML
0-3 INJECTION, SOLUTION INTRAVENOUS; SUBCUTANEOUS NIGHTLY
Status: DISCONTINUED | OUTPATIENT
Start: 2021-08-14 | End: 2021-08-19 | Stop reason: HOSPADM

## 2021-08-14 RX ORDER — INSULIN LISPRO 100 [IU]/ML
0-6 INJECTION, SOLUTION INTRAVENOUS; SUBCUTANEOUS
Status: DISCONTINUED | OUTPATIENT
Start: 2021-08-15 | End: 2021-08-19 | Stop reason: HOSPADM

## 2021-08-14 RX ORDER — DILTIAZEM HYDROCHLORIDE 5 MG/ML
INJECTION INTRAVENOUS
Status: DISPENSED
Start: 2021-08-14 | End: 2021-08-15

## 2021-08-14 RX ADMIN — FUROSEMIDE 40 MG: 10 INJECTION, SOLUTION INTRAMUSCULAR; INTRAVENOUS at 18:21

## 2021-08-14 RX ADMIN — CEFEPIME HYDROCHLORIDE 2000 MG: 2 INJECTION, POWDER, FOR SOLUTION INTRAVENOUS at 13:08

## 2021-08-14 RX ADMIN — DILTIAZEM HYDROCHLORIDE 10 MG: 5 INJECTION INTRAVENOUS at 19:00

## 2021-08-14 RX ADMIN — Medication 10 ML: at 23:22

## 2021-08-14 RX ADMIN — INSULIN LISPRO 1 UNITS: 100 INJECTION, SOLUTION INTRAVENOUS; SUBCUTANEOUS at 23:23

## 2021-08-14 RX ADMIN — ATORVASTATIN CALCIUM 10 MG: 10 TABLET, FILM COATED ORAL at 23:22

## 2021-08-14 RX ADMIN — DILTIAZEM HYDROCHLORIDE 5 MG/HR: 5 INJECTION, SOLUTION INTRAVENOUS at 21:27

## 2021-08-14 RX ADMIN — Medication 2 PUFF: at 19:57

## 2021-08-14 RX ADMIN — SODIUM CHLORIDE 1000 ML: 9 INJECTION, SOLUTION INTRAVENOUS at 13:06

## 2021-08-14 RX ADMIN — BRIMONIDINE TARTRATE, TIMOLOL MALEATE 1 DROP: 2; 5 SOLUTION/ DROPS OPHTHALMIC at 23:20

## 2021-08-14 RX ADMIN — VANCOMYCIN HYDROCHLORIDE 1500 MG: 5 INJECTION, POWDER, LYOPHILIZED, FOR SOLUTION INTRAVENOUS at 18:58

## 2021-08-14 RX ADMIN — TETANUS TOXOID, REDUCED DIPHTHERIA TOXOID AND ACELLULAR PERTUSSIS VACCINE, ADSORBED 0.5 ML: 5; 2.5; 8; 8; 2.5 SUSPENSION INTRAMUSCULAR at 12:43

## 2021-08-14 RX ADMIN — PREDNISOLONE ACETATE 1 DROP: 10 SUSPENSION/ DROPS OPHTHALMIC at 23:21

## 2021-08-14 RX ADMIN — DILTIAZEM HYDROCHLORIDE 5 MG/HR: 5 INJECTION, SOLUTION INTRAVENOUS at 18:25

## 2021-08-14 ASSESSMENT — ENCOUNTER SYMPTOMS
NAUSEA: 0
CONSTIPATION: 0
DIARRHEA: 0
SHORTNESS OF BREATH: 0
COLOR CHANGE: 0
ABDOMINAL PAIN: 0
VOMITING: 0
PHOTOPHOBIA: 0
CHEST TIGHTNESS: 0
RESPIRATORY NEGATIVE: 1
COUGH: 0
BACK PAIN: 0

## 2021-08-14 ASSESSMENT — PAIN DESCRIPTION - LOCATION: LOCATION: LEG

## 2021-08-14 ASSESSMENT — PAIN DESCRIPTION - PAIN TYPE: TYPE: ACUTE PAIN

## 2021-08-14 ASSESSMENT — PAIN SCALES - GENERAL: PAINLEVEL_OUTOF10: 5

## 2021-08-14 ASSESSMENT — PAIN DESCRIPTION - ORIENTATION: ORIENTATION: LEFT;LOWER

## 2021-08-14 NOTE — ED PROVIDER NOTES
This patient was seen by the Mid-Level Provider. I have seen and examined the patient, agree with the workup, evaluation, management and diagnosis. Care plan has been discussed. My assessment reveals a 79-year-old male who presents with some weakness. This is a 79-year-old male who apparently fell earlier this morning and the children called EMS who brought the patient here for further evaluation. Patient does not quite remember what happened this morning. He was unable to get up on his own. Radiology results:    CT CERVICAL SPINE WO CONTRAST   Final Result   No acute fracture or subluxation. C4-5 mild degenerative anterolisthesis. Overall moderate multilevel spondylosis. CT HEAD WO CONTRAST   Final Result   No acute intracranial abnormality. Stable pattern of atrophy and microvascular ischemic disease in the cerebral   white matter. XR TIBIA FIBULA LEFT (2 VIEWS)   Final Result   No acute osseous injury or osteomyelitis. XR CHEST PORTABLE   Final Result   Interstitial and alveolar opacities in the mid and lower lungs, equivocal for   pulmonary edema or pneumonia.                LABS:    Labs Reviewed   CBC WITH AUTO DIFFERENTIAL - Abnormal; Notable for the following components:       Result Value    Hematocrit 39.7 (*)     Neutrophils Absolute 9.5 (*)     Lymphocytes Absolute 0.2 (*)     Basophils Absolute 0.4 (*)     All other components within normal limits    Narrative:     Performed at:  [facility]  [address] [facility], [address]   Phone [phone]   COMPREHENSIVE METABOLIC PANEL W/ REFLEX TO MG FOR LOW K - Abnormal; Notable for the following components:    Glucose 320 (*)     All other components within normal limits    Narrative:     Performed at:  [facility]  [address] [facility], [address]   Phone [phone]   BRAIN NATRIURETIC PEPTIDE - Abnormal; Notable for the following beats a minute with a rapid ventricular response. Left bundle-branch block. Exam:    Well-nourished male no acute distress. He appeared to be alert and oriented with no focal neurological motor or sensory deficits throughout. Medical decision makin-year-old male who presents with some weakness and falling earlier. The patient's work-up today is consistent with multifocal pneumonia. However, the patient has been vaccinated with maternal vaccine. The patient was hypoxic. He is doing better with oxygen on board. Patient also had an injury to his left lower extremity and hip that work-up was unremarkable. Patient is tachypneic, tachycardic and febrile. The patient admitted for further care, antibiotics and consultation. He is in stable condition. FINAL IMPRESSION:    1. Acute respiratory failure with hypoxia ([address]    2. Supratherapeutic INR    3. Pneumonia due to infectious organism, unspecified laterality, unspecified part of lung    4. General weakness      Critical CARE time: 40 tablets of critical care time spent with this patient with multiple visits to the bedside.      Ana Maria Burkett MD  [phone]

## 2021-08-14 NOTE — PROGRESS NOTES
Pt to 5906, on stretcher with transport at bedside. Pt cyanotic, sp02 was 62% on 3L. Immediately placed on 15L high bo and nonrebreather. sats up to 95%. Pt tachy 150s, afib rvr. Dr Alka Lund called to bedside and is en route,     1810 Dr Monique Rodriguez at bedside, ordered 40mg lasix, newman, with an immediate 1800ml out, cardizem bolus and infusion started. IV placed in R hand, vancomycin given.

## 2021-08-14 NOTE — ED PROVIDER NOTES
[facility]        Pt Name: Ruiz Winters  MRN: [id number]  Gilbertgfadiel 6/9/1930  Date of evaluation: 8/14/2021  Provider: LEONEL Yuan  PCP: Jeyson Herrera MD  Note Started: 10:59 AM EDT        I have seen and evaluated this patient with my supervising physician Shari Brush MD.    [facility]       Chief Complaint   Patient presents with    Fatigue     Pt. in per [facility] EMS with report of pt. falling this am and unable to get up, call for lift assist but pt's children wanted him evaluated. HISTORY OF PRESENT ILLNESS   (Location, Timing/Onset, Context/Setting, Quality, Duration, Modifying Factors, Severity, Associated Signs and Symptoms)  Note limiting factors. Chief Complaint: Rito Johnson is a 80 y.o. male with past medical history of previous MI, CAD, atrial fibrillation on warfarin, hypertension, diabetes and hyperlipidemia who presents to the ED with complaint of weakness and fatigue. Patient arrived from home by EMS with complaints of weakness and fatigue. Fell this morning. Unable to get up and children wanted patient sent to the emergency department for further evaluation and treatment. Patient denies any injury from the fall this morning. States he did fall yesterday and has a abrasion/wound to his left posterior leg/calf. Patient denies any pain over this area. Patient denies any head injury or loss of conscious. Denies headache, neck pain, visual changes, speech disturbances or numbness/tingling. Denies any lightheadedness or dizziness. Denies chest pain, cough, shortness of breath, abdominal pain, nausea/vomiting, urinary symptoms or changes in bowel movements. Denies any fever chills. Denies any rashes or lesions. Denies any other injury throughout.     Nursing Notes were all reviewed and agreed with or any disagreements were addressed in the HPI.    REVIEW OF SYSTEMS    (2-9 systems for level 4, 10 or more for level 5)     Review of Systems   Constitutional: Positive for activity change and fatigue. Negative for appetite change, chills, diaphoresis and fever. Eyes: Negative for photophobia and visual disturbance. Respiratory: Negative. Negative for cough, chest tightness and shortness of breath. Cardiovascular: Negative. Negative for chest pain, palpitations and leg swelling. Gastrointestinal: Negative for abdominal pain, constipation, diarrhea, nausea and vomiting. Genitourinary: Negative for decreased urine volume, difficulty urinating, dysuria, flank pain, frequency, hematuria and urgency. Musculoskeletal: Positive for myalgias. Negative for arthralgias, back pain, gait problem, joint swelling, neck pain and neck stiffness. Skin: Positive for wound. Negative for color change, pallor and rash. Neurological: Positive for weakness. Negative for dizziness, tremors, seizures, syncope, facial asymmetry, speech difficulty, light-headedness, numbness and headaches. Positives and Pertinent negatives as per HPI. Except as noted above in the ROS, all other systems were reviewed and negative.        PAST MEDICAL HISTORY     Past Medical History:   Diagnosis Date    [facility] [facility]     Arthritis     Atrial fibrillation ([facility] [facility]     CAD (coronary artery disease)     Cancer of colon ([facility] [facility]     Glaucoma     Dr. Aileen Brock Gout, unspecified     Hypertension     Hypertrophy of prostate without urinary obstruction and other lower urinary tract symptoms (LUTS)     Other and unspecified hyperlipidemia     Type II or unspecified type diabetes mellitus without mention of complication, uncontrolled          SURGICAL HISTORY     Past Surgical History:   Procedure Laterality Date    ABDOMEN SURGERY  1992    colon    CAROTID ENDARTERECTOMY Left 2/24/13    CATARACT REMOVAL      left    COLON SURGERY      COLONOSCOPY      CORONARY ANGIOPLASTY WITH STENT PLACEMENT      6 stents  12/24/05    CYSTOSCOPY  3-27-14    EYE SURGERY Left 03/02/2018    glacoma open angle 0S baerveldt implant 0S    EYE SURGERY Left 04/13/2018    posterior sclerotomy and aspiartion choroidal fluid     EYE SURGERY Left 2009    drain    JOINT REPLACEMENT  2010    right knee    OTHER SURGICAL HISTORY      post-op reaction to pain med- very combative    REVISION TOTAL KNEE ARTHROPLASTY Right 08/17/2011    Tibial liner exchange & synovectomy         CURRENTMEDICATIONS       Previous Medications    BIMATOPROST (LUMIGAN) 0.01 % SOLN OPHTHALMIC DROPS    Place 1 drop into both eyes nightly    BRIMONIDINE-TIMOLOL (COMBIGAN) 0.2-0.5 % OPHTHALMIC SOLUTION    Place 1 drop into both eyes every 12 hours.     DILTIAZEM (CARDIZEM CD) 120 MG EXTENDED RELEASE CAPSULE    Take 1 capsule daily    DORZOLAMIDE (TRUSOPT) 2 % OPHTHALMIC SOLUTION    Place 1 drop into both eyes 2 times daily     GLIMEPIRIDE (AMARYL) 1 MG TABLET    Take 1 tablet daily    LANCETS THIN MISC    2-4 Devices by Does not apply route daily 2-4 leilani daily    METOPROLOL SUCCINATE (TOPROL XL) 100 MG EXTENDED RELEASE TABLET    Take 1/2 tablet every day    ONETOUCH VERIO STRIP    1 each by In Vitro route 4 times daily Test 2-4 times a day E11.9    PREDNISOLONE ACETATE (PRED FORTE) 1 % OPHTHALMIC SUSPENSION    Apply 1 drop to eye 4 times daily    SIMVASTATIN (ZOCOR) 20 MG TABLET    Take 1 tablet by mouth nightly    TAMSULOSIN (FLOMAX) 0.4 MG CAPSULE    Take 1 capsule by mouth daily    WARFARIN (COUMADIN) 5 MG TABLET    Take 7.5 mg (one and a half tablets) coumadin every evening until directed otherwise by Dr. Geni Brooks     Hydrochlorothiazide, Metformin and related, and Morphine    FAMILYHISTORY       Family History   Problem Relation Age of Onset    High Blood Pressure Mother     Heart Disease Mother     Stroke Mother     High Blood Pressure Father     High Cholesterol Neg Hx           SOCIAL HISTORY       Social History     Tobacco Use    Smoking status: Former Smoker     Packs/day: 1.00     Years: 30.00     Pack years: 30.00     Quit date: 1976     Years since quittin.0    Smokeless tobacco: Never Used   Vaping Use    Vaping Use: Never used   Substance Use Topics    Alcohol use: Yes     Alcohol/week: 10.0 standard drinks     Types: 10 Cans of beer per week    Drug use: No       SCREENINGS             PHYSICAL EXAM    (up to 7 for level 4, 8 or more for level 5)     ED Triage Vitals [21 1017]   BP Temp Temp Source Pulse Resp SpO2 Height Weight   (!) 142/98 100.6 °F (38.1 °C) Oral 131 (!) 32 (!) 87 % 5' 11\" (1.803 m) 214 lb (97.1 kg)       Physical Exam  Constitutional:       General: He is not in acute distress. Appearance: Normal appearance. He is well-developed. He is not ill-appearing, toxic-appearing or diaphoretic. HENT:      Head: Normocephalic and atraumatic. Right Ear: External ear normal.      Left Ear: External ear normal.   Eyes:      General:         Right eye: No discharge. Left eye: No discharge. Extraocular Movements: Extraocular movements intact. Conjunctiva/sclera: Conjunctivae normal.      Pupils: Pupils are equal, round, and reactive to light. Cardiovascular:      Rate and Rhythm: Tachycardia present. Rhythm irregular. Pulses: Normal pulses. Heart sounds: Normal heart sounds. No murmur heard. No friction rub. No gallop. Comments: Irregularly irregular rate and rhythm. 2+ radial pulses bilaterally. There is no pedal edema. There is no calf tenderness. No JVD. Pulmonary:      Effort: Respiratory distress present. Breath sounds: No stridor. No wheezing, rhonchi or rales. Comments: Tachypnea noted. Patient 87% on room air upon arrival.  On 3 L nasal cannula oxygen with oxygen 95%. No conversational dyspnea noted on oxygen. Chest:      Chest wall: No tenderness. Abdominal:      General: Abdomen is flat.  Bowel sounds are normal. There is no distension. Palpations: Abdomen is soft. There is no mass. Tenderness: There is no abdominal tenderness. There is no right CVA tenderness, left CVA tenderness, guarding or rebound. Hernia: No hernia is present. Musculoskeletal:         General: Normal range of motion. Cervical back: Normal range of motion and neck supple. Comments: Wound to the left posterior leg/calf. There is some warmth noted. No erythema. Ecchymoses noted. No other abrasion or laceration throughout. No bony tenderness. Full range of motion strength. Distal neurovascular intact. Gait deferred. Skin:     General: Skin is warm and dry. Coloration: Skin is not pale. Findings: No erythema or rash. Neurological:      General: No focal deficit present. Mental Status: He is alert. GCS: GCS eye subscore is 4. GCS verbal subscore is 5. GCS motor subscore is 6. Cranial Nerves: Cranial nerves are intact. No cranial nerve deficit, dysarthria or facial asymmetry. Sensory: Sensation is intact. No sensory deficit. Motor: Motor function is intact. Comments: Gait deferred.    Psychiatric:         Behavior: Behavior normal.         DIAGNOSTIC RESULTS   LABS:    Labs Reviewed   CBC WITH AUTO DIFFERENTIAL - Abnormal; Notable for the following components:       Result Value    Hematocrit 39.7 (*)     Neutrophils Absolute 9.5 (*)     Lymphocytes Absolute 0.2 (*)     Basophils Absolute 0.4 (*)     All other components within normal limits    Narrative:     Performed at:  [facility] [address], [place], [facility] [place]   Phone [phone]   COMPREHENSIVE METABOLIC PANEL W/ REFLEX TO MG FOR LOW K - Abnormal; Notable for the following components:    Glucose 320 (*)     All other components within normal limits    Narrative:     Performed at:  [facility] [address], [place], [facility] [place]   Phone [phone]   BRAIN NATRIURETIC PEPTIDE - Abnormal; Notable for the following components:    Pro- (*)     All other components within normal limits    Narrative:     Performed at:  [facility] [address]   Phone [phone]   PROTIME-INR - Abnormal; Notable for the following components:    Protime 70.6 (*)     INR 5.81 (*)     All other components within normal limits    Narrative:     CALL  [facility] tel. [phone],  Coag results called to and read back by librado velazquez, 08/14/2021 11:15, by  Jeb Negron  Performed at:  [facility] [address]   Phone [phone]   LACTATE, SEPSIS - Abnormal; Notable for the following components:    Lactic Acid, Sepsis 2.1 (*)     All other components within normal limits    Narrative:     Performed at:  [facility] [address]   Phone [phone]   PROCALCITONIN - Abnormal; Notable for the following components:    Procalcitonin 0.18 (*)     All other components within normal limits    Narrative:     Performed at:  [facility] [address], [place]   Phone [phone]   LACTATE DEHYDROGENASE - Abnormal; Notable for the following components:     (*)     All other components within normal limits    Narrative:     Performed at:  [facility] [address], [employer] [place]   Phone [phone]   CULTURE, BLOOD 1   CULTURE, BLOOD 2   TROPONIN    Narrative:     Performed at:  [facility] [address], [place] [place]   Phone [phone]   CK    Narrative:     Performed at:  [facility] [address]   Phone [phone]   URINE RT REFLEX  [address], SEPSIS   FERRITIN   COVID-19   COVID-19 COVID-19   COVID-19       When ordered only abnormal lab results are displayed. All other labs were within normal range or not returned as of this dictation. EKG: When ordered, EKG's are interpreted by the Emergency Department Physician in the absence of a cardiologist.  Please see their note for interpretation of EKG. RADIOLOGY:   Non-plain film images such as CT, Ultrasound and MRI are read by the radiologist. Plain radiographic images are visualized and preliminarily interpreted by the ED Provider with the below findings:        Interpretation per the Radiologist below, if available at the time of this note:    CT CERVICAL SPINE WO CONTRAST   Final Result   No acute fracture or subluxation. C4-5 mild degenerative anterolisthesis. Overall moderate multilevel spondylosis. CT HEAD WO CONTRAST   Final Result   No acute intracranial abnormality. Stable pattern of atrophy and microvascular ischemic disease in the cerebral   white matter. XR TIBIA FIBULA LEFT (2 VIEWS)   Final Result   No acute osseous injury or osteomyelitis. XR CHEST PORTABLE   Final Result   Interstitial and alveolar opacities in the mid and lower lungs, equivocal for   pulmonary edema or pneumonia. No results found. PROCEDURES   Unless otherwise noted below, none     Procedures    CRITICAL CARE TIME   The total critical care time spent while evaluating and treating this patient was 35 minutes. This excludes time spent doing separately billable procedures. This includes time at the bedside, data interpretation, medication management, obtaining critical history from collateral sources if the patient is unable to provide it directly, and physician consultation. Specifics of interventions taken and potentially life-threatening diagnostic considerations are listed above in the medical decision making.         CONSULTS:  None      EMERGENCY DEPARTMENT COURSE and DIFFERENTIAL DIAGNOSIS/MDM: Vitals:    Vitals:    08/14/21 1017 08/14/21 1024 08/14/21 1045   BP: (!) 142/98  136/72   Pulse: 131  125   Resp: (!) 32 (!) 32    Temp: 100.6 °F (38.1 °C)     TempSrc: Oral     SpO2: (!) 87% 95% 98%   Weight: 214 lb (97.1 kg)     Height: 5' 11\" (1.803 m)         Patient was given the following medications:  Medications   0.9 % sodium chloride bolus (has no administration in time range)   cefepime (MAXIPIME) 2000 mg IVPB minibag (has no administration in time range)   vancomycin (VANCOCIN) 1,500 mg in dextrose 5 % 250 mL IVPB (has no administration in time range)   Tetanus-Diphth-Acell Pertussis (BOOSTRIX) injection 0.5 mL (0.5 mLs Intramuscular Given 8/14/21 1243)           Patient is a 80-year-old male who presents to the ED implant of generalized weakness and fatigue. Patient is suffering from generalized weakness with associated fall. Unable to get up after the fall. He is anticoagulated secondary to history of atrial fibrillation. INR was supratherapeutic at 5.81. Patient upon arrival noted to be tachypneic, hypoxic on room air at 87%, tachycardic and febrile with temp of 100.6. Patient on 3 L nasal cannula oxygen with oxygen saturation in the upper 90s. IV was established and blood work obtained. Does have what appears to be healing skin tear to the left posterior leg. There is some warmth around this but there is no erythema. Low suspicion for underlying cellulitis. Tetanus was updated given unknown status. CBC did show normal white count, hemoglobin and platelets. CMP showed glucose of 320 otherwise unremarkable. Troponin normal. . Lactic acid 2.1. Procalcitonin 0.18. CK normal. . Ferritin pending. CT of the head and cervical spine obtained given supratherapeutic INR with weakness and fall. CT of the cervical spine showed no acute abnormality. CT of the head showed no acute abnormality. X-ray of the left hip/fib showed no acute injury or osteomyelitis.  Chest x-ray showed interstitial and alveolar opacities equivocal for pulmonary edema or pneumonia. Does not appear clinically to represent fluid overload. Believe patient's fever, hypoxia, tachycardia and tachypnea most likely secondary to underlying pneumonia. We will start empiric antibiotics with cefepime and vancomycin. Given fluids here in the ED. Patient has been vaccinated for Covid with but are not x2. We will obtain Covid swab because patient will require admission. Case we discussed with hospital service for admission for generalized weakness with acute respiratory failure/hypoxia with what appears to be pneumonia. FINAL IMPRESSION      1. Acute respiratory failure with hypoxia ([address]    2. Supratherapeutic INR    3. Pneumonia due to infectious organism, unspecified laterality, unspecified part of lung    4. General weakness          DISPOSITION/PLAN   DISPOSITION Decision To Admit 08/14/2021 12:21:46 PM      PATIENT REFERRED TO:  No follow-up provider specified.     DISCHARGE MEDICATIONS:  New Prescriptions    No medications on file       DISCONTINUED MEDICATIONS:  Discontinued Medications    No medications on file              (Please note that portions of this note were completed with a voice recognition program.  Efforts were made to edit the dictations but occasionally words are mis-transcribed.)    LEONEL Gooden (electronically signed)          [facility], [place]  08/14/21 [id number]

## 2021-08-14 NOTE — H&P
HOSPITALISTS HISTORY AND PHYSICAL    8/14/2021 12:52 PM    Patient Information:  Kianna Silva is a 80 y.o. male [id number]  PCP:  Elisa Diaz MD (Tel: [phone] )    Chief complaint:    Chief Complaint   Patient presents with    Fatigue     Pt. in [facility] EMS with report of pt. falling this am and unable to get up, call for lift assist but pt's children wanted him evaluated. History of Present Illness:  Rajat Farris is a 80 y.o. male who presented with fell at home unable to get up. Patient said PERRL, conjunctiva normal having generalized fatigue weakness. And fever. No sick contacts. Patient was able to get up after the fall. On warfarin. Denies hitting head. Patient having shortness of breath cough. Nonproductive. Patient on arrival to the ER was found to have tachycardic hypoxic with fever needing 3 days nasal cannula. Nothing that makes it better or worse. REVIEW OF SYSTEMS:   Constitutional: Negative for fever,chills or night sweats  ENT: Negative for rhinorrhea, epistaxis, hoarseness, sore throat. Respiratory: Negative for shortness of breath,wheezing  Cardiovascular: Negative for chest pain, palpitations   Gastrointestinal: Negative for nausea, vomiting, diarrhea  Genitourinary: Negative for polyuria, dysuria   Hematologic/Lymphatic: Negative for bleeding tendency, easy bruising  Musculoskeletal: Negative for myalgias and arthralgias  Neurologic: Negative for confusion,dysarthria. Skin: Negative for itching,rash, good capillary refill. Psychiatric: Negative for depression,anxiety, agitation. Endocrine: Negative for polydipsia,polyuria,heat /cold intolerance.     Past Medical History:   has a past medical history of Acute MI ([facility] [address] Arthritis, Atrial fibrillation ([facility] [address] CAD (coronary artery disease), Cancer of colon ([facility] [address] Glaucoma, Gout, unspecified, Hypertension, Hypertrophy of prostate without urinary obstruction and other lower urinary tract symptoms (LUTS), Other and unspecified hyperlipidemia, and Type II or unspecified type diabetes mellitus without mention of complication, uncontrolled. Past Surgical History:   has a past surgical history that includes Colon surgery; Cataract removal; Coronary angioplasty with stent; other surgical history; Revision total knee arthroplasty (Right, 08/17/2011); Abdomen surgery (1992); Colonoscopy; Carotid endarterectomy (Left, 2/24/13); Cystocopy (3-27-14); joint replacement (2010); Eye surgery (Left, 03/02/2018); Eye surgery (Left, 04/13/2018); and eye surgery (Left, 2009). Medications:  No current facility-administered medications on file prior to encounter.      Current Outpatient Medications on File Prior to Encounter   Medication Sig Dispense Refill    simvastatin (ZOCOR) 20 MG tablet Take 1 tablet by mouth nightly 90 tablet 1    tamsulosin (FLOMAX) 0.4 MG capsule Take 1 capsule by mouth daily 90 capsule 1    metoprolol succinate (TOPROL XL) 100 MG extended release tablet Take 1/2 tablet every day 45 tablet 1    glimepiride (AMARYL) 1 MG tablet Take 1 tablet daily 90 tablet 1    dilTIAZem (CARDIZEM CD) 120 MG extended release capsule Take 1 capsule daily 90 capsule 3    ONETOUCH VERIO strip 1 each by In Vitro route 4 times daily Test 2-4 times a day E11.9 300 each 3    prednisoLONE acetate (PRED FORTE) 1 % ophthalmic suspension Apply 1 drop to eye 4 times daily      Lancets Thin MISC 2-4 Devices by Does not apply route daily 2-4 leilani daily 300 each 3    bimatoprost (LUMIGAN) 0.01 % SOLN ophthalmic drops Place 1 drop into both eyes nightly      dorzolamide (TRUSOPT) 2 % ophthalmic solution Place 1 drop into both eyes 2 times daily       warfarin (COUMADIN) 5 MG tablet Take 7.5 mg (one and a half tablets) coumadin every evening until directed otherwise by Dr. Bran Zapata (Patient taking differently: Take 5 mg by mouth See Admin Instructions Take 7.5mg on Sun & Wed and 5mg all other days as directed by F CC) 90 tablet 3    brimonidine-timolol (COMBIGAN) 0.2-0.5 % ophthalmic solution Place 1 drop into both eyes every 12 hours.  [DISCONTINUED] glimepiride (AMARYL) 1 MG tablet TAKE 1 TABLET EVERY MORNING 90 tablet 0    [DISCONTINUED] metoprolol succinate (TOPROL XL) 100 MG extended release tablet TAKE 1/2 TABLET EVERY DAY 45 tablet 0       Allergies: Allergies   Allergen Reactions    Hydrochlorothiazide Other (See Comments)     Gout.  Metformin And Related Diarrhea    Morphine      PCA, caused confusion        Social History:   reports that he quit smoking about 45 years ago. He has a 30.00 pack-year smoking history. He has never used smokeless tobacco. He reports current alcohol use of about 10.0 standard drinks of alcohol per week. He reports that he does not use drugs. Family History:  family history includes Heart Disease in his mother; High Blood Pressure in his father and mother; Stroke in his mother. ,     Physical Exam:  /72   Pulse 125   Temp 100.6 °F (38.1 °C) (Oral)   Resp (!) 32   Ht 5' 11\" (1.803 m)   Wt 214 lb (97.1 kg)   SpO2 98%   BMI 29.85 kg/m²     General appearance:  Appears comfortable. Well nourished  Eyes: Sclera clear, pupils equal  ENT: Moist mucus membranes, no thrush. Trachea midline. Cardiovascular: Regular rhythm, normal S1, S2. No murmur, gallop, rub. No edema in lower extremities  Respiratory: Clear to auscultation bilaterally, no wheeze, good inspiratory effort  Gastrointestinal: Abdomen soft, non-tender, not distended, normal bowel sounds  Musculoskeletal: No cyanosis in digits, neck supple  Neurology: Cranial nerves grossly intact. Alert and oriented in time, place and person. No speech or motor deficits  Psychiatry: Appropriate affect.  Not agitated  Skin: Warm, dry, normal turgor, no rash    Labs:  CBC:   Lab Results   Component Value Date    WBC 10.6 08/14/2021    RBC 4.22 08/14/2021    HGB 13.5 08/14/2021 HCT 39.7 08/14/2021    MCV 94.1 08/14/2021    MCH 32.1 08/14/2021    MCHC 34.1 08/14/2021    RDW 14.7 08/14/2021     08/14/2021    MPV 7.6 08/14/2021     BMP:    Lab Results   Component Value Date     08/14/2021    K 4.2 08/14/2021     08/14/2021    CO2 24 08/14/2021    BUN 16 08/14/2021    CREATININE 0.8 08/14/2021    CALCIUM 9.4 08/14/2021    GFRAA >60 08/14/2021    GFRAA >60 08/02/2011    LABGLOM >60 08/14/2021    GLUCOSE 320 08/14/2021       Chest Xray:   EKG:    I visualized CXR images and EKG strips     Discussed  with      Problem List  Active Problems:    Acute respiratory failure (HCC)  Resolved Problems:    * No resolved hospital problems. *        Assessment/Plan:   Hypoxic respiratory failure  -Likely concerning for pneumonia.  -Patient has been started on IV antibiotics which will continue.  -Monitor closely. Pneumonia  -Patient has been vaccinated against Covid  -Procalcitonin is high likely bacterial  -IV antibiotics. -Covid has been ordered    A.  Fib  -Uncontrolled currently due to  -Resume Cardizem and beta-blocker if uncontrolled we may need to start the drip to control the heart rate temporarily    Supratherapeutic INR  -INR is 5.8 on Coumadin-  Monitor closely        Moncho Doyle MD    8/14/2021 12:52 PM

## 2021-08-14 NOTE — ED NOTES
Bed: 07  Expected date:   Expected time:   Means of arrival:   Comments:  Julio Colbert, MARIAH  23/32/70 [id number]

## 2021-08-14 NOTE — PROGRESS NOTES
Called by nursing staff patient arrived hypoxic with sats in the 60's from the ED with hr in the 140-150s bp 140/80  -placed on hiflow sats up to 95 %    Patient lying in bed tachypnic, hr in the 140's  Is aaox3  Irregular irregular hr no murmunrs aprpeciated  Cap refill< 2secs + JVD  Lung bilateral crackles  Abdomen soft nt  Trace edema BLE    Acute hypoxic respiratory failure secondary to likely chf exacerbation and pna  - 40mg iv lasix stat monitor output  - vanc dose still pending not given in ed will give now, did receive cefepime  - covid pending, although vaccinated    PAF with RVR  - troponin, echo  - bolus 10mg of dilt and titrate for HR< 110  - tsh  - cards consult    supratherapeutic inr: no signs ofbleeding monitor        I spent 32 minutes providing critical care services to this hemodynamically unstable patient

## 2021-08-14 NOTE — ED NOTES
PAGE TO DEYANIRA NAVA AT BEDSIDE ATTEMPTED TO HELP PT WITH URINAL, UNABLE TO URINATE, WOULD LIKE A CATHETER.       Marshell Duane, RN  08/14/21 3179

## 2021-08-14 NOTE — ED NOTES
REPAGE TO DR CHAVARRIA CONCERNING PT REQUEST FOR CATHETER. DR CHAVARRIA UPSET PT IS NOT UPSTAIRS ON INPT SIDE, RN EXPLAINED THE SHORT STAFFING IN ED AND 5C. PT'S SON VERBALLY AGGRESSIVE OVER HIS FATHER NOT URINATING SINCE 0800, POINTED FINGER IN THIS RN'S FACE AGRESSIVELY \"CAN YOU GO THAT LONG WITHOUT URINATING\" AT THIS TIME RN REITERATED COVID PTS AREN'T SUPPOSED TO HAVE VISITORS AND ASKED HIM TO LEAVE, SLAMMED THE DOOR ON ME, SECURITY NOTIFIED.  LEONEL SIGALA STATES PT IS SEPTIC AND NOT ABLE TO [address] [facility] [facility] [address], RN  08/14/21 8560

## 2021-08-14 NOTE — PROGRESS NOTES
Perfect serve to Dr Caitlin Vasques as follows   0'\"   [phone] Hospital or Facility: F From: [facility] RE: Abigail Henry please come [phone] now. pt from ed, needs MD STAT  8/14/21 6:06 PM   spo02 was 62, HR 140s-150s , placed on 15l high bo and non rebreather.  sats now up 95%  Needs eval

## 2021-08-14 NOTE — PROGRESS NOTES
Clinical Pharmacy Note: Pharmacy to Dose Warfarin    Pharmacy consulted by Dr. Mccormick Forward to dose warfarin. Merry Hinton is a 80 y.o. male  is receiving warfarin for indication: Afib    INR Goal Range: 2.0 - 3.0   Prior to admission warfarin dosing regimen: 5 mg daily  INR today:   Lab Results   Component Value Date    INR 5.81 08/14/2021       Assessment/Plan:  INR is supratherapeutic on prior to admission dosing regimen. Based on today's assessment, hold warfarin for today. Daily INR is ordered. Pharmacy will continue to monitor and make adjustments to regimen as necessary.      Thank you for the consult,     Evan Luna, PharmD  PGY-1 Pharmacy Resident  [id number]

## 2021-08-15 LAB
ANION GAP SERPL CALCULATED.3IONS-SCNC: 9 MMOL/L (ref 3–16)
BUN BLDV-MCNC: 17 MG/DL (ref 7–20)
CALCIUM SERPL-MCNC: 9.2 MG/DL (ref 8.3–10.6)
CHLORIDE BLD-SCNC: 102 MMOL/L (ref 99–110)
CO2: 28 MMOL/L (ref 21–32)
CREAT SERPL-MCNC: 0.8 MG/DL (ref 0.8–1.3)
EKG ATRIAL RATE: 129 BPM
EKG DIAGNOSIS: NORMAL
EKG Q-T INTERVAL: 376 MS
EKG QRS DURATION: 146 MS
EKG QTC CALCULATION (BAZETT): 520 MS
EKG R AXIS: -49 DEGREES
EKG T AXIS: 103 DEGREES
EKG VENTRICULAR RATE: 115 BPM
GFR AFRICAN AMERICAN: >60
GFR NON-AFRICAN AMERICAN: >60
GLUCOSE BLD-MCNC: 120 MG/DL (ref 70–99)
GLUCOSE BLD-MCNC: 128 MG/DL (ref 70–99)
GLUCOSE BLD-MCNC: 169 MG/DL (ref 70–99)
GLUCOSE BLD-MCNC: 175 MG/DL (ref 70–99)
GLUCOSE BLD-MCNC: 180 MG/DL (ref 70–99)
GLUCOSE BLD-MCNC: 186 MG/DL (ref 70–99)
HCT VFR BLD CALC: 37.3 % (ref 40.5–52.5)
HEMOGLOBIN: 12.4 G/DL (ref 13.5–17.5)
INR BLD: 6.52 (ref 0.88–1.12)
MCH RBC QN AUTO: 31.2 PG (ref 26–34)
MCHC RBC AUTO-ENTMCNC: 33.3 G/DL (ref 31–36)
MCV RBC AUTO: 93.8 FL (ref 80–100)
PDW BLD-RTO: 14.7 % (ref 12.4–15.4)
PERFORMED ON: ABNORMAL
PLATELET # BLD: 159 K/UL (ref 135–450)
PMV BLD AUTO: 7.5 FL (ref 5–10.5)
POTASSIUM REFLEX MAGNESIUM: 4 MMOL/L (ref 3.5–5.1)
PROTHROMBIN TIME: 79.7 SEC (ref 9.9–12.7)
RBC # BLD: 3.98 M/UL (ref 4.2–5.9)
SODIUM BLD-SCNC: 139 MMOL/L (ref 136–145)
TSH REFLEX: 2.26 UIU/ML (ref 0.27–4.2)
URINE CULTURE, ROUTINE: NORMAL
WBC # BLD: 13.3 K/UL (ref 4–11)

## 2021-08-15 PROCEDURE — 2580000003 HC RX 258: Performed by: INTERNAL MEDICINE

## 2021-08-15 PROCEDURE — 6370000000 HC RX 637 (ALT 250 FOR IP): Performed by: HOSPITALIST

## 2021-08-15 PROCEDURE — 94761 N-INVAS EAR/PLS OXIMETRY MLT: CPT

## 2021-08-15 PROCEDURE — 36415 COLL VENOUS BLD VENIPUNCTURE: CPT

## 2021-08-15 PROCEDURE — 2700000000 HC OXYGEN THERAPY PER DAY

## 2021-08-15 PROCEDURE — 6370000000 HC RX 637 (ALT 250 FOR IP): Performed by: PHYSICIAN ASSISTANT

## 2021-08-15 PROCEDURE — 2060000000 HC ICU INTERMEDIATE R&B

## 2021-08-15 PROCEDURE — 2500000003 HC RX 250 WO HCPCS: Performed by: INTERNAL MEDICINE

## 2021-08-15 PROCEDURE — 85610 PROTHROMBIN TIME: CPT

## 2021-08-15 PROCEDURE — 94640 AIRWAY INHALATION TREATMENT: CPT

## 2021-08-15 PROCEDURE — 80048 BASIC METABOLIC PNL TOTAL CA: CPT

## 2021-08-15 PROCEDURE — 85027 COMPLETE CBC AUTOMATED: CPT

## 2021-08-15 PROCEDURE — 2580000003 HC RX 258: Performed by: HOSPITALIST

## 2021-08-15 PROCEDURE — 93010 ELECTROCARDIOGRAM REPORT: CPT | Performed by: INTERNAL MEDICINE

## 2021-08-15 PROCEDURE — 6360000002 HC RX W HCPCS: Performed by: HOSPITALIST

## 2021-08-15 RX ORDER — IPRATROPIUM BROMIDE AND ALBUTEROL SULFATE 2.5; .5 MG/3ML; MG/3ML
1 SOLUTION RESPIRATORY (INHALATION)
Status: DISCONTINUED | OUTPATIENT
Start: 2021-08-15 | End: 2021-08-15

## 2021-08-15 RX ORDER — METHYLPREDNISOLONE SODIUM SUCCINATE 125 MG/2ML
60 INJECTION, POWDER, LYOPHILIZED, FOR SOLUTION INTRAMUSCULAR; INTRAVENOUS ONCE
Status: DISCONTINUED | OUTPATIENT
Start: 2021-08-15 | End: 2021-08-15

## 2021-08-15 RX ORDER — DORZOLAMIDE HCL 20 MG/ML
1 SOLUTION/ DROPS OPHTHALMIC 3 TIMES DAILY
Status: DISCONTINUED | OUTPATIENT
Start: 2021-08-15 | End: 2021-08-19 | Stop reason: HOSPADM

## 2021-08-15 RX ORDER — PREDNISOLONE ACETATE 10 MG/ML
1 SUSPENSION/ DROPS OPHTHALMIC 3 TIMES DAILY
Status: DISCONTINUED | OUTPATIENT
Start: 2021-08-15 | End: 2021-08-19 | Stop reason: HOSPADM

## 2021-08-15 RX ORDER — BRIMONIDINE TARTRATE, TIMOLOL MALEATE 2; 5 MG/ML; MG/ML
1 SOLUTION/ DROPS OPHTHALMIC EVERY 12 HOURS
Status: DISCONTINUED | OUTPATIENT
Start: 2021-08-15 | End: 2021-08-19 | Stop reason: HOSPADM

## 2021-08-15 RX ORDER — FUROSEMIDE 10 MG/ML
40 INJECTION INTRAMUSCULAR; INTRAVENOUS ONCE
Status: DISCONTINUED | OUTPATIENT
Start: 2021-08-15 | End: 2021-08-15

## 2021-08-15 RX ORDER — DILTIAZEM HYDROCHLORIDE 120 MG/1
120 CAPSULE, COATED, EXTENDED RELEASE ORAL DAILY
Status: DISCONTINUED | OUTPATIENT
Start: 2021-08-15 | End: 2021-08-19 | Stop reason: HOSPADM

## 2021-08-15 RX ADMIN — DORZOLAMIDE HCL 1 DROP: 20 SOLUTION/ DROPS OPHTHALMIC at 08:04

## 2021-08-15 RX ADMIN — ACETAMINOPHEN 650 MG: 325 TABLET ORAL at 21:29

## 2021-08-15 RX ADMIN — Medication 2 PUFF: at 20:34

## 2021-08-15 RX ADMIN — Medication 2 PUFF: at 17:44

## 2021-08-15 RX ADMIN — CEFEPIME HYDROCHLORIDE 2000 MG: 2 INJECTION, POWDER, FOR SOLUTION INTRAVENOUS at 13:28

## 2021-08-15 RX ADMIN — INSULIN LISPRO 1 UNITS: 100 INJECTION, SOLUTION INTRAVENOUS; SUBCUTANEOUS at 18:01

## 2021-08-15 RX ADMIN — Medication 2 PUFF: at 12:15

## 2021-08-15 RX ADMIN — PREDNISOLONE ACETATE 1 DROP: 10 SUSPENSION/ DROPS OPHTHALMIC at 16:09

## 2021-08-15 RX ADMIN — BRIMONIDINE TARTRATE, TIMOLOL MALEATE 1 DROP: 2; 5 SOLUTION/ DROPS OPHTHALMIC at 18:29

## 2021-08-15 RX ADMIN — ATORVASTATIN CALCIUM 10 MG: 10 TABLET, FILM COATED ORAL at 21:29

## 2021-08-15 RX ADMIN — DORZOLAMIDE HCL 1 DROP: 20 SOLUTION/ DROPS OPHTHALMIC at 18:30

## 2021-08-15 RX ADMIN — DILTIAZEM HYDROCHLORIDE 5 MG/HR: 5 INJECTION, SOLUTION INTRAVENOUS at 08:25

## 2021-08-15 RX ADMIN — METOPROLOL SUCCINATE 50 MG: 50 TABLET, EXTENDED RELEASE ORAL at 08:05

## 2021-08-15 RX ADMIN — DORZOLAMIDE HCL 1 DROP: 20 SOLUTION/ DROPS OPHTHALMIC at 16:08

## 2021-08-15 RX ADMIN — Medication 2 PUFF: at 20:33

## 2021-08-15 RX ADMIN — INSULIN LISPRO 1 UNITS: 100 INJECTION, SOLUTION INTRAVENOUS; SUBCUTANEOUS at 21:41

## 2021-08-15 RX ADMIN — INSULIN LISPRO 1 UNITS: 100 INJECTION, SOLUTION INTRAVENOUS; SUBCUTANEOUS at 13:28

## 2021-08-15 RX ADMIN — PREDNISOLONE ACETATE 1 DROP: 10 SUSPENSION/ DROPS OPHTHALMIC at 08:04

## 2021-08-15 RX ADMIN — DILTIAZEM HYDROCHLORIDE 120 MG: 120 CAPSULE, EXTENDED RELEASE ORAL at 16:08

## 2021-08-15 RX ADMIN — TAMSULOSIN HYDROCHLORIDE 0.4 MG: 0.4 CAPSULE ORAL at 08:05

## 2021-08-15 RX ADMIN — CEFEPIME HYDROCHLORIDE 2000 MG: 2 INJECTION, POWDER, FOR SOLUTION INTRAVENOUS at 01:23

## 2021-08-15 RX ADMIN — Medication 10 ML: at 21:30

## 2021-08-15 RX ADMIN — PREDNISOLONE ACETATE 1 DROP: 10 SUSPENSION/ DROPS OPHTHALMIC at 18:30

## 2021-08-15 ASSESSMENT — PAIN SCALES - GENERAL
PAINLEVEL_OUTOF10: 0
PAINLEVEL_OUTOF10: 1
PAINLEVEL_OUTOF10: 0

## 2021-08-15 NOTE — FLOWSHEET NOTE
08/15/21 1455   Provider Notification   Reason for Communication Evaluate   Provider Name Rianna Carbajal   Provider Notification Physician   Method of Communication Secure Message   Response Waiting for response   Notification Time [phone] [id number]   Pt has HR controlled on Cardizem gtt with rates . Do you want to switch to Cardizem PO at this time? Pt takes Cardizem CD 120mg extended release daily at home.     Yes lets give that dose now and wean down the gtt to hr below 110

## 2021-08-15 NOTE — PROGRESS NOTES
Pharmacy to Dose Warfarin    Pharmacy consulted to dose warfarin for Afib. INR Goal: 23    INR today: 6.52    Assessment/Plan:  - INR supratherapeutic today  - Will continue holding warfarin  - Daily INR ordered    Pharmacy will continue to follow.     Wang Hamilton, PharmD, BCPS  Clinical Pharmacist  [id number]

## 2021-08-15 NOTE — PROGRESS NOTES
Morning assessment complete, see doc flow sheets. VSS, morning medications given, see MAR. HFNC 8L. Spoke with son to retime and change eye drop schedule. No further needs expressed, up to chair for lunch, call light within reach, video monitoring for safety.

## 2021-08-15 NOTE — PROGRESS NOTES
[facility] PROGRESS NOTE    8/15/2021 1:29 PM        Name: Tho Hill . Admitted: 8/14/2021  Primary Care Provider: Jorden Spatz, MD (Tel: [phone])                        Subjective:  . No acute events overnight. Resting well. Pain control. Diet ok. Labs reviewed  Denies any chest pain sob.      Reviewed interval ancillary notes    Current Medications  bimatoprost (LUMIGAN) 0.01 % ophthalmic drops 1 drop -- PATIENT SUPPLY, Nightly  brimonidine-timolol (COMBIGAN) 0.2-0.5 % ophthalmic solution 1 drop -- PATIENT SUPPLY, Q12H  dorzolamide (TRUSOPT) 2 % ophthalmic solution 1 drop -- PATIENT SUPPLY, TID  prednisoLONE acetate (PRED FORTE) 1 % ophthalmic suspension 1 drop -- PATIENT SUPPLY, TID  metoprolol succinate (TOPROL XL) extended release tablet 50 mg, Daily  atorvastatin (LIPITOR) tablet 10 mg, Nightly  tamsulosin (FLOMAX) capsule 0.4 mg, Daily  cefepime (MAXIPIME) 2000 mg IVPB minibag, Q12H  sodium chloride flush 0.9 % injection 5-40 mL, 2 times per day  sodium chloride flush 0.9 % injection 5-40 mL, PRN  polyethylene glycol (GLYCOLAX) packet 17 g, Daily PRN  acetaminophen (TYLENOL) tablet 650 mg, Q6H PRN   Or  acetaminophen (TYLENOL) suppository 650 mg, Q6H PRN  warfarin (COUMADIN) daily dosing (placeholder), RX Placeholder  dilTIAZem 125 mg in dextrose 5 % 125 mL infusion, Continuous  perflutren lipid microspheres (DEFINITY) injection 1.65 mg, ONCE PRN  ipratropium (ATROVENT HFA) 17 MCG/ACT inhaler 2 puff, 4x daily  albuterol sulfate  (90 Base) MCG/ACT inhaler 2 puff, 4x daily  insulin lispro (1 Unit Dial) 0-6 Units, TID WC  insulin lispro (1 Unit Dial) 0-3 Units, Nightly  glucose (GLUTOSE) 40 % oral gel 15 g, PRN  dextrose 50 % IV solution, PRN  glucagon (rDNA) injection 1 mg, PRN  dextrose 5 % solution, PRN        Objective:  /62 Pulse 83   Temp 96.6 °F (35.9 °C) (Temporal)   Resp 18   Ht 5' 11\" (1.803 m)   Wt 217 lb (98.4 kg)   SpO2 97%   BMI 30.27 kg/m²     Intake/Output Summary (Last 24 hours) at 8/15/2021 1329  Last data filed at 8/15/2021 0507  Gross per 24 hour   Intake 1063.77 ml   Output 4155 ml   Net -3091.23 ml      Wt Readings from Last 3 Encounters:   08/15/21 217 lb (98.4 kg)   06/15/21 225 lb (102.1 kg)   05/25/21 226 lb (102.5 kg)       General appearance:  Appears comfortable  Eyes: Sclera clear. Pupils equal.  ENT: Moist oral mucosa. Trachea midline, no adenopathy. Cardiovascular: Irregular tachycardia, normal S1, S2. No murmur. No edema in lower extremities  Respiratory: Bilateral crackles. .   GI: Abdomen soft, no tenderness, not distended, normal bowel sounds  Musculoskeletal: No cyanosis in digits, neck supple    Left leg  -Still has some serous drainage fluid. Soaking through the dressing. Patient did have lower cut on the lower calf. There is venous stasis blood strain but no obvious tenderness. Does have erythema  Neurology: CN 2-12 grossly intact. No speech or motor deficits    Psych: Normal affect. Alert and oriented in time, place and person  Skin: Warm, dry, normal turgor    Labs and Tests:  CBC:   Recent Labs     08/14/21  1042 08/15/21  0533   WBC 10.6 13.3*   HGB 13.5 12.4*    159     BMP:    Recent Labs     08/14/21  1042 08/15/21  0533    139   K 4.2 4.0    102   CO2 24 28   BUN 16 17   CREATININE 0.8 0.8   GLUCOSE 320* 120*     Hepatic:   Recent Labs     08/14/21  1042   AST 16   ALT 10   BILITOT 0.9   ALKPHOS 83       Discussed care with family and patient             Spent 30  minutes with patient and family at bedside and on unit reviewing medical records and labs, spent greater than 50% time counseling patient and family on diagnosis and plan   Problem List  Active Problems:    Acute respiratory failure ([address]  Resolved Problems:    * No resolved hospital problems.  * Assessment & Plan:   1. Acute hypoxic respiratory failure  -Likely multifactorial in setting up A. fib RVR also with underlying pneumonia  -Patient also with some concern for CHF. -Patient has been treated with IV Lasix antibiotics for now. A. fib with RVR  -Continue Cardizem drip wean as tolerated  -IV Lasix  -Cardiology consult  -Anticoagulation on Coumadin with supratherapeutic INR. Supratherapeutic INR  -INR is higher today's as expected. Hopefully will continue to trend down tomorrow no signs of bleeding so we will monitor    CHF exacerbation  -Likely secondary to above. Cellulitis of the leg  -Patient apparently had cath getting out of the car few days back now having some serous drainage but no purulent drainage noted  -Patient likely has some venous stasis but had some cotton now with some serous drainage  -I believe this is more of venous stasis pain rather than actual cellulitis  -On cefepime for now which will continue empirically for now hopefully we can wean it down  -Monitor closely for any worsening symptoms      Diet: ADULT DIET;  Regular  Code:Full Code  DVT PPX lovenox       Angel Gill MD   8/15/2021 1:29 PM

## 2021-08-15 NOTE — PROGRESS NOTES
4 Eyes Skin Assessment     NAME:  Janet Ponce  YOB: 1930  MEDICAL RECORD NUMBER:  [id number]    The patient is being assess for  Admission    I agree that 2 RN's have performed a thorough Head to Toe Skin Assessment on the patient. ALL assessment sites listed below have been assessed. Areas assessed by both nurses:    Head, Face, Ears, Shoulders, Back, Chest, Arms, Elbows, Hands, Sacrum. Buttock, Coccyx, Ischium and Legs. Feet and Heels        Does the Patient have a Wound?  Yes         Gutierrez Prevention initiated:  Yes   Wound Care Orders initiated:  Yes    Pressure Injury (Stage 3,4, Unstageable, DTI, NWPT, and Complex wounds) if present place consult order under [de-identified] NA    New and Established Ostomies if present place consult order under : NA      Nurse 1 eSignature: Electronically signed by Eh Meyer RN on 8/15/21 at 5:38 AM EDT    **SHARE this note so that the co-signing nurse is able to place an eSignature**    Nurse 2 eSignature: Electronically signed by Sunny Iqbal RN on 8/15/21 at 6:03 AM EDT 99

## 2021-08-15 NOTE — PROGRESS NOTES
Admission assessment completed. Cardizem gtt infusing. Monae catheter in place and draining. SPO2 97% on 15 L HFNC. Fall precautions in place. Call light and bedside table within reach. Patient denies further needs at this time.

## 2021-08-15 NOTE — PROGRESS NOTES
ADVANCED CARE PLANNING    Name:Brayan Munguia       :  1930              MRN:  [id number]      Purpose of Encounter: Advanced care planning in light of problem listed above   Parties in attendance: :Jenaro Aldana MD, Family members:  Decisional Capacity:Yes    Diagnosis: Active Problems:    Acute respiratory failure ([address]  Resolved Problems:    * No resolved hospital problems. *    Patients Medical Story:Presented with worsening symptom of dx above. With at risk for life threatening event. Procedure and testing as noted in progress noted. We discussed patient long term goal and also wishes and aggressive care. Discussed in detail about code status and what it means with detailed explanation. Goals of Care Determinations: Patient wishes to focus on full code with aggressive care, CPR, intubation long term vent and facility as well. Plan: Will notify Yumiko Luevano MD of change in care plan. Will look at further interventions as needed. Code Status: At this time patient wishes to be Full Code  Time Spent with Patient: 21 minutes      Electronically signed by Jenaro River MD on 8/15/2021 at 1:57 PM  Thank you Yumiko Luevano MD for the opportunity to be involved in this patient's care.

## 2021-08-16 LAB
ANION GAP SERPL CALCULATED.3IONS-SCNC: 9 MMOL/L (ref 3–16)
BUN BLDV-MCNC: 17 MG/DL (ref 7–20)
CALCIUM SERPL-MCNC: 9 MG/DL (ref 8.3–10.6)
CHLORIDE BLD-SCNC: 104 MMOL/L (ref 99–110)
CO2: 24 MMOL/L (ref 21–32)
CREAT SERPL-MCNC: 0.7 MG/DL (ref 0.8–1.3)
GFR AFRICAN AMERICAN: >60
GFR NON-AFRICAN AMERICAN: >60
GLUCOSE BLD-MCNC: 131 MG/DL (ref 70–99)
GLUCOSE BLD-MCNC: 132 MG/DL (ref 70–99)
GLUCOSE BLD-MCNC: 180 MG/DL (ref 70–99)
GLUCOSE BLD-MCNC: 235 MG/DL (ref 70–99)
GLUCOSE BLD-MCNC: 237 MG/DL (ref 70–99)
HCT VFR BLD CALC: 34.5 % (ref 40.5–52.5)
HEMOGLOBIN: 11.8 G/DL (ref 13.5–17.5)
INR BLD: 3.31 (ref 0.88–1.12)
LV EF: 58 %
LVEF MODALITY: NORMAL
MCH RBC QN AUTO: 32.1 PG (ref 26–34)
MCHC RBC AUTO-ENTMCNC: 34.2 G/DL (ref 31–36)
MCV RBC AUTO: 93.9 FL (ref 80–100)
PDW BLD-RTO: 14.8 % (ref 12.4–15.4)
PERFORMED ON: ABNORMAL
PLATELET # BLD: 146 K/UL (ref 135–450)
PMV BLD AUTO: 7.3 FL (ref 5–10.5)
POTASSIUM SERPL-SCNC: 3.7 MMOL/L (ref 3.5–5.1)
PROTHROMBIN TIME: 39.3 SEC (ref 9.9–12.7)
RBC # BLD: 3.68 M/UL (ref 4.2–5.9)
SARS-COV-2: NOT DETECTED
SODIUM BLD-SCNC: 137 MMOL/L (ref 136–145)
WBC # BLD: 8 K/UL (ref 4–11)

## 2021-08-16 PROCEDURE — 6360000002 HC RX W HCPCS: Performed by: HOSPITALIST

## 2021-08-16 PROCEDURE — 92610 EVALUATE SWALLOWING FUNCTION: CPT

## 2021-08-16 PROCEDURE — 93306 TTE W/DOPPLER COMPLETE: CPT

## 2021-08-16 PROCEDURE — 6370000000 HC RX 637 (ALT 250 FOR IP): Performed by: INTERNAL MEDICINE

## 2021-08-16 PROCEDURE — 6370000000 HC RX 637 (ALT 250 FOR IP): Performed by: HOSPITALIST

## 2021-08-16 PROCEDURE — 99222 1ST HOSP IP/OBS MODERATE 55: CPT | Performed by: NURSE PRACTITIONER

## 2021-08-16 PROCEDURE — 51702 INSERT TEMP BLADDER CATH: CPT

## 2021-08-16 PROCEDURE — 94640 AIRWAY INHALATION TREATMENT: CPT

## 2021-08-16 PROCEDURE — 80048 BASIC METABOLIC PNL TOTAL CA: CPT

## 2021-08-16 PROCEDURE — 2700000000 HC OXYGEN THERAPY PER DAY

## 2021-08-16 PROCEDURE — 94761 N-INVAS EAR/PLS OXIMETRY MLT: CPT

## 2021-08-16 PROCEDURE — 2580000003 HC RX 258: Performed by: HOSPITALIST

## 2021-08-16 PROCEDURE — 2060000000 HC ICU INTERMEDIATE R&B

## 2021-08-16 PROCEDURE — 36415 COLL VENOUS BLD VENIPUNCTURE: CPT

## 2021-08-16 PROCEDURE — 85027 COMPLETE CBC AUTOMATED: CPT

## 2021-08-16 PROCEDURE — 85610 PROTHROMBIN TIME: CPT

## 2021-08-16 RX ORDER — IPRATROPIUM BROMIDE AND ALBUTEROL SULFATE 2.5; .5 MG/3ML; MG/3ML
1 SOLUTION RESPIRATORY (INHALATION)
Status: DISCONTINUED | OUTPATIENT
Start: 2021-08-16 | End: 2021-08-19 | Stop reason: HOSPADM

## 2021-08-16 RX ADMIN — Medication 10 ML: at 20:55

## 2021-08-16 RX ADMIN — ATORVASTATIN CALCIUM 10 MG: 10 TABLET, FILM COATED ORAL at 20:54

## 2021-08-16 RX ADMIN — PREDNISOLONE ACETATE 1 DROP: 10 SUSPENSION/ DROPS OPHTHALMIC at 08:11

## 2021-08-16 RX ADMIN — PREDNISOLONE ACETATE 1 DROP: 10 SUSPENSION/ DROPS OPHTHALMIC at 18:49

## 2021-08-16 RX ADMIN — Medication 2 PUFF: at 13:05

## 2021-08-16 RX ADMIN — DORZOLAMIDE HCL 1 DROP: 20 SOLUTION/ DROPS OPHTHALMIC at 18:49

## 2021-08-16 RX ADMIN — BRIMONIDINE TARTRATE, TIMOLOL MALEATE 1 DROP: 2; 5 SOLUTION/ DROPS OPHTHALMIC at 18:50

## 2021-08-16 RX ADMIN — BRIMONIDINE TARTRATE, TIMOLOL MALEATE 1 DROP: 2; 5 SOLUTION/ DROPS OPHTHALMIC at 08:14

## 2021-08-16 RX ADMIN — INSULIN LISPRO 2 UNITS: 100 INJECTION, SOLUTION INTRAVENOUS; SUBCUTANEOUS at 17:09

## 2021-08-16 RX ADMIN — Medication 10 ML: at 08:22

## 2021-08-16 RX ADMIN — DORZOLAMIDE HCL 1 DROP: 20 SOLUTION/ DROPS OPHTHALMIC at 15:07

## 2021-08-16 RX ADMIN — CEFEPIME HYDROCHLORIDE 2000 MG: 2 INJECTION, POWDER, FOR SOLUTION INTRAVENOUS at 02:00

## 2021-08-16 RX ADMIN — INSULIN LISPRO 1 UNITS: 100 INJECTION, SOLUTION INTRAVENOUS; SUBCUTANEOUS at 21:02

## 2021-08-16 RX ADMIN — DILTIAZEM HYDROCHLORIDE 120 MG: 120 CAPSULE, EXTENDED RELEASE ORAL at 08:15

## 2021-08-16 RX ADMIN — Medication 2 PUFF: at 08:57

## 2021-08-16 RX ADMIN — METOPROLOL SUCCINATE 50 MG: 50 TABLET, EXTENDED RELEASE ORAL at 08:15

## 2021-08-16 RX ADMIN — DORZOLAMIDE HCL 1 DROP: 20 SOLUTION/ DROPS OPHTHALMIC at 08:12

## 2021-08-16 RX ADMIN — PREDNISOLONE ACETATE 1 DROP: 10 SUSPENSION/ DROPS OPHTHALMIC at 15:07

## 2021-08-16 RX ADMIN — CEFEPIME HYDROCHLORIDE 2000 MG: 2 INJECTION, POWDER, FOR SOLUTION INTRAVENOUS at 12:40

## 2021-08-16 RX ADMIN — TAMSULOSIN HYDROCHLORIDE 0.4 MG: 0.4 CAPSULE ORAL at 08:16

## 2021-08-16 RX ADMIN — INSULIN LISPRO 1 UNITS: 100 INJECTION, SOLUTION INTRAVENOUS; SUBCUTANEOUS at 12:24

## 2021-08-16 RX ADMIN — IPRATROPIUM BROMIDE AND ALBUTEROL SULFATE 1 AMPULE: .5; 3 SOLUTION RESPIRATORY (INHALATION) at 19:51

## 2021-08-16 ASSESSMENT — PAIN SCALES - GENERAL
PAINLEVEL_OUTOF10: 0

## 2021-08-16 NOTE — CONSULTS
[facility]   Electrophysiology Nurse Practitioner  Consult    Date: 8/16/2021  Date of admission: 8/14/2021 10:08 AM  Reason for Admission: Acute respiratory failure ([address] [J96.00]  General weakness [R53.1]  Supratherapeutic INR [R79.1]  Acute respiratory failure with hypoxia ([address] [J96.01]  Pneumonia due to infectious organism, unspecified laterality, unspecified part of lung [J18.9]    Consult Requesting Physician: Avril South MD    -Reason for Consultation: Atrial fibrillation    Chief Complaint   Patient presents with    Fatigue     Pt. in [facility] with report of pt. falling this am and unable to get up, call for lift assist but pt's children wanted him evaluated. HISTORY OF PRESENT ILLNESS: History obtained from patient and medical record. Wilmar Marie is a 80 y.o. male with a past medical history of CAD s/p PCI to LAD (2005), chronic permanent atrial fibrillation, HTN, carotid stenosis and HLD. Pt presented to hospital with worsening fatigue and weakness. He had a fall at home, but was unable to get up. His children sent him to ER to be evaluated. His daughter states he \"not himself\" and very ashen after the episode. They report no recent illness or infection. He reportedly scraped his left leg a few days prior to the episode, but they deny any other issues. No report of his vital signs on EMS arrival are available. On arrival to ER, he was noted to be in AF with RVR and his BP was 142/98. He was hypoxic on arrival and was placed on oxygen. His covid testing was negative. He was noted to have elevated lactic acid and procalcitonin on admission. His BNP was 634 with supratherapeutic INR. A UA was positive for UTI and he was started on ABX. EP consulted due to atrial fibrillation. Per chart, pt has longstanding atrial fibrillation since 2014. At the time of my visit, he has no complaints, including CP, SOB, dizziness, or lightheadedness.      Interval Hx: Today, he is being seen for consult. He is in atrial fibrillation, rate fairly well controlled. His BP is stable. His daughter is concerned about him getting weak. Patient seen and examined. Clinical notes reviewed. Telemetry reviewed. No new complaints today. No major events overnight. Denies having chest pain, palpitations, shortness of breath, orthopnea, cough, or dizziness at the time of this visit. Allergies: Allergies   Allergen Reactions    Hydrochlorothiazide Other (See Comments)     Gout.  Metformin And Related Diarrhea    Morphine      PCA, caused confusion     Home Meds:  Prior to Visit Medications    Medication Sig Taking?  Authorizing Provider   simvastatin (ZOCOR) 20 MG tablet Take 1 tablet by mouth nightly Yes Lorraine Bermeo MD   tamsulosin [facility]) 0.4 MG capsule Take 1 capsule by mouth daily Yes Lorraine Bermeo MD   metoprolol succinate (TOPROL XL) 100 MG extended release tablet Take 1/2 tablet every day Yes Lorraine Bermeo MD   glimepiride (AMARYL) 1 MG tablet Take 1 tablet daily Yes Lorraine Bermeo MD   dilTIAZem (CARDIZEM CD) 120 MG extended release capsule Take 1 capsule daily Yes Lorraine Bermeo MD   [facility] VERIO strip 1 each by In Vitro route 4 times daily Test 2-4 times a day E11.9 Yes SULEMAN Valera CNP   prednisoLONE acetate (PRED FORTE) 1 % ophthalmic suspension Place 1 drop into the left eye 3 times daily  Yes Historical Provider, MD   Lancets Thin MISC 2-4 Devices by Does not apply route daily 2-4 leilani daily Yes SULEMAN Mireles CNP   bimatoprost (LUMIGAN) 0.01 % SOLN ophthalmic drops Place 1 drop into the right eye nightly  Yes Historical Provider, MD   dorzolamide (TRUSOPT) 2 % ophthalmic solution Place 1 drop into the right eye 2 times daily  Yes Historical Provider, MD   warfarin (COUMADIN) 5 MG tablet Take 7.5 mg (one and a half tablets) coumadin every evening until directed otherwise by Dr. Brooke Sosa  Patient taking differently: Take 5 mg by mouth daily  Yes Merline López MD   brimonidine-timolol (COMBIGAN) 0.2-0.5 % ophthalmic solution Place 1 drop into the right eye every 12 hours  Yes Historical Provider, MD   glimepiride (AMARYL) 1 MG tablet TAKE 1 TABLET EVERY MORNING  Marce Layne MD   metoprolol succinate (TOPROL XL) 100 MG extended release tablet TAKE 1/2 TABLET EVERY DAY  Marce Layne MD      Past Medical History:  Past Medical History:   Diagnosis Date    [facility]     Arthritis     Atrial fibrillation ([facility]     CAD (coronary artery disease)     Cancer of colon ([facility]     Glaucoma     Dr. Jaspreet Aaron Gout, unspecified     Hypertension     Hypertrophy of prostate without urinary obstruction and other lower urinary tract symptoms (LUTS)     Other and unspecified hyperlipidemia     Type II or unspecified type diabetes mellitus without mention of complication, uncontrolled       Past Surgical History:    has a past surgical history that includes Colon surgery; Cataract removal; Coronary angioplasty with stent; other surgical history; Revision total knee arthroplasty (Right, 08/17/2011); Abdomen surgery (1992); Colonoscopy; Carotid endarterectomy (Left, 2/24/13); Cystocopy (3-27-14); joint replacement (2010); Eye surgery (Left, 03/02/2018); Eye surgery (Left, 04/13/2018); and eye surgery (Left, 2009). Social History:  Reviewed. reports that he quit smoking about 45 years ago. He has a 30.00 pack-year smoking history. He has never used smokeless tobacco. He reports current alcohol use of about 10.0 standard drinks of alcohol per week. He reports that he does not use drugs. Family History:  Reviewed. family history includes Heart Disease in his mother; High Blood Pressure in his father and mother; Stroke in his mother.      Review of System:  · Constitutional: Negative for fever, night sweats, chills, weight changes, or weakness  · Skin: Negative for rash, dry skin, pruritus, bruising, bleeding, blood clots, or changes in skin pigment  · HEENT: Negative for vision changes, ringing in the ears, sore throat, dysphagia, or swollen lymph nodes  · Respiratory: Reviewed in HPI  · Cardiovascular: Reviewed in HPI  · Gastrointestinal: Negative for abdominal pain, N/V/D, constipation, or black/tarry stools  · Genito-Urinary: Negative for dysuria, incontinence, urgency, or hematuria  · Musculoskeletal: Positive for weakness. Negative for joint swelling, muscle pain, or injuries  · Neurological/Psych: Negative for confusion, seizures, headaches, balance issues or TIA-like symptoms. No anxiety, depression, or insomnia    Physical Examination:  Vitals:    08/16/21 0859   BP:    Pulse:    Resp: 17   Temp:    SpO2:       In: 457.9 [P.O.:240; I.V.:117.9]  Out: 1200    Wt Readings from Last 3 Encounters:   08/16/21 216 lb 0.8 oz (98 kg)   06/15/21 225 lb (102.1 kg)   05/25/21 226 lb (102.5 kg)       Telemetry: Personally Reviewed  - Atrial fibrillation, rate 80-100s  · Constitutional: Cooperative and in no apparent distress, and appears stated age  · Skin: Warm and pink; no pallor, cyanosis, bruising, or clubbing  · HEENT: Symmetric and normocephalic. PERRL, EOM intact. Conjunctiva pink with clear sclera. Mucus membranes pink and moist. Teeth intact. Thyroid smooth without nodules or goiter. · Cardiovascular: Regular rate and irregular rhythm. S1/S2 present without murmurs, rubs, or gallops. Peripheral pulses 2+, capillary refill < 3 seconds. No peripheral edema, no elevation of JVP  · Respiratory: Respirations symmetric and unlabored. Lungs clear to auscultation bilaterally, no wheezing, crackles, or rhonchi. On 2L of oxygen  · Gastrointestinal: Abdomen soft and rotund. Bowel sounds normoactive in all quadrants without tenderness or masses. · Musculoskeletal: + generalized weakness  · Neurologic/Psych: Awake and orientated to person, place and time.  Calm affect, appropriate mood    Pertinent labs, diagnostic, device, and imaging results reviewed as a part of this visit    Labs:  BMP:   Recent Labs     21  1042 08/15/21  0533 21  0628    139 137   K 4.2 4.0 3.7    102 104   CO2 24 28 24   BUN 16 17 17   CREATININE 0.8 0.8 0.7*     Estimated Creatinine Clearance: 82 mL/min (A) (based on SCr of 0.7 mg/dL (L)). CBC:   Recent Labs     21  1042 08/15/21  0533 21  0628   WBC 10.6 13.3* 8.0   HGB 13.5 12.4* 11.8*   HCT 39.7* 37.3* 34.5*   MCV 94.1 93.8 93.9    159 146     Thyroid:   Lab Results   Component Value Date    TSH 1.85 2011     Lipids:  Lab Results   Component Value Date    CHOL 91 12/10/2020    HDL 40 12/10/2020    TRIG 75 12/10/2020     LFTS:   Lab Results   Component Value Date    ALT 10 2021    AST 16 2021    ALKPHOS 83 2021    PROT 7.5 2021    PROT 6.8 2010    AGRATIO 1.2 2021    BILITOT 0.9 2021     Cardiac Enzymes:   Lab Results   Component Value Date    CKTOTAL 77 2021    TROPONINI <0.01 2021    TROPONINI <0.01 2021    TROPONINI <0.01 2019     Coags:   Lab Results   Component Value Date    PROTIME 39.3 2021    PROTIME 31 2018    INR 3.31 2021       EC21  Atrial fibrillation with RVR and LBBB    ECHO:   The patient appears to be in atrial fibrillation. Normal left ventricle size and systolic function with an EF of 55%. Mild concentric left ventricular hypertrophy.    Trivial mitral regurgitation is present. The left atrium is mildly dilated. The aortic valve appears sclerotic but opens well. Mild tricuspid regurgitation with RVSP estimated at 33 mmHg. Cath:   JURGEN to LAD    CXR: 21  Interstitial and alveolar opacities in the mid and lower lungs, equivocal for   pulmonary edema or pneumonia.         CT Head: 21  No acute intracranial abnormality.       Stable pattern of atrophy and microvascular ischemic disease in the cerebral   white matter.      Problem List: Patient Active Problem List    Diagnosis Date Noted    HTN (hypertension) 01/25/2013    Atrial fibrillation ([facility] 06/24/2011    CAD (coronary artery disease) 06/24/2011    Hyperlipidemia 06/24/2011    Acute respiratory failure ([facility] 08/14/2021    Benign prostatic hyperplasia with urinary frequency 01/11/2016    Glaucoma 01/12/2015    Cataract 08/04/2014    Carotid artery disease ([facility] 02/24/2014    Type 2 diabetes mellitus without complication, without long-term current use of insulin ([facility]     Osteoarthritis of knee 12/15/2010        Assessment and Plan:     1. Chronic permanent Atrial Fibrillation  - Currently in AF, rate fairly controlled  - Continue Toprol XL 50 mg QD, cardizem 120 mg QD  - [id number] score:high. High risk for stroke and thromboembolism. Anticoagulation is recommended  ~ Coumadin on hold; appreciate pharmacy assistance with dosing      - Poor candidate for EP procedures given advanced age and long standing nature of his AF    2. LBBB   - Intermittent   - No work up needed     - Consider outpatient holter monitoring to assess HR profile after resolution of acute issues    3. CAD  - Hx of JURGEN to LAD (2005)  - Stable  - No complaints of angina  - Continue BB, and statin   ~ No ASA due to coumadin  - Followed by Dr. Pete Dennison as OP    4. HTN  - Controlled   ~ Goal <140/80  - Continue current medications    5. Hypoxia   - CXR concerning for edema vs PNA    ~ No s/s of overt HF; weight below prior cardiology office weights   - Stable, down to 1L of 02   - No need for further diuretic at this time   - Encourage IS and ambulation    6. Supratherapeutic INR   - ? Cause (doubt compliance as usually stable)   - No s/s of bleeding   - INR trending down    7. Cellulitis, UTI   - On ABX   - Management per hospitalist    No further EP recommendations. Will arrange follow up with Dr. Pete Dennison or general cardiology in next month. EP will sign off. Please call if there are any questions.  Thank you for

## 2021-08-16 NOTE — PROGRESS NOTES
Facility/Department: [address]  Initial Assessment  DYSPHAGIA BEDSIDE SWALLOW EVALUATION     Patient: Shyam Ramirez   : 1930   MRN: [id number]      Evaluation Date: 2021   Admitting Diagnosis: Acute respiratory failure ([address] [J96.00]  General weakness [R53.1]  Supratherapeutic INR [R79.1]  Acute respiratory failure with hypoxia ([address] [J96.01]  Pneumonia due to infectious organism, unspecified laterality, unspecified part of lung [J18.9]  Pain: Denied                         H&P:   Shyam Ramirez is a 80 y.o. male who presented with fell at home unable to get up. Patient said PERRL, conjunctiva normal having generalized fatigue weakness. And fever. No sick contacts. Patient was able to get up after the fall. On warfarin. Denies hitting head. Patient having shortness of breath cough. Nonproductive. Patient on arrival to the ER was found to have tachycardic hypoxic with fever needing 3 days nasal cannula. Nothing that makes it better or worse. Recent Chest xray:  Interstitial and alveolar opacities in the mid and lower lungs, equivocal for   pulmonary edema or pneumonia. Recent Head CT:  No acute intracranial abnormality.       Stable pattern of atrophy and microvascular ischemic disease in the cerebral   white matter. History/Prior Level of Function:   Living Status:  Pt lives alone. He is independent with self care. Prior Dysphagia History:  None - pt reported he occasionally has trouble with dry cereal in the mornings. Dysphagia Impressions/Diagnosis: Oropharyngeal Dysphagia   Pt presents with minimal oropharyngeal dysphagia. He is currently alert, oriented, sitting upright in bed, on 1L of O2 with saturation ranging between 85-92%. He denied any difficulty with eating/drinking. He accepted regular solid, soft solid, puree and thin liquid. Pt tolerated all presented consistencies. Timing and strength of laryngeal elevation was minimally reduced.  He demonstrated one weak delayed cough, unable to correlate with a certain consistency. Pt's aspiration risks correlate with his oxygen support, recent respiratory decline and limited mobility. An MBS was ordered but does not appear necessary at this time - will continue to monitor diet tolerance at bedside and recommend instrumental assessment if/when appropriate. Recommended Diet and Intervention 8/16/2021:  Diet Solids Recommendation:  Regular  Liquid Consistency Recommendation: Thin  Recommended form of Meds:   Whole with water      Compensatory Swallowing Strategies:  Upright as possible with all PO intake , Small bites/sips , Eat/feed slowly    SHORT TERM DYSPHAGIA GOALS/PLAN OF CARE: Speech therapy for dysphagia tx 3-5 times per week during acute care stay. 1. Pt will functionally tolerate recommended diet with no overt clinical s/s of aspiration  2. Pt will demonstrate understanding of aspiration risk and precautions via education/demonstration with occasional prompting  3. Pt will advance to least restrictive diet as indicated   4. If clinical s/s of aspiration/penetration continue to be noted, Pt will participate in Modified Barium Swallow Study     Dysphagia Therapeutic Intervention:  Diet Tolerance Monitoring , Patient/Family Education     Discharge Recommendations: Recommend ongoing speech therapy for dysphagia therapy upon discharge from hospital, pending diet tolerance.      Patient Positioning: Upright in bed    Current Diet Level (prior to evaluation):  Regular diet     Respiratory Status:   []Room Air   [x]O2 via nasal cannula: 1L    []Other:    Dentition:  [x]Adequate  []Dentures   []Missing Many Teeth  []Edentulous  []Other:    Baseline Vocal Quality:  [x]Normal  []Dysphonic   []Aphonic   []Hoarse  []Wet  []Weak  []Other:    Volitional Cough:  [x]Strong  []Weak  []Wet  []Absent  []Congested  []Other:    Volitional Swallow:   []Absent   []Delayed     [x]Adequate     []Required use of drink     Oral Mechanism Exam:  [x]WFL []Mild   [] Moderate  []Severe  []To be assessed  Impaired:   []Left side      []Right side    []Labial ROM/Coordination    []Labial Symmetry   []Lingual ROM/Coordination   []Lingual Symmetry  []Gag  []Other:     Oral Phase: [x]WFL []Mild   [] Moderate  []Severe  []To be assessed   []Impaired/Prolonged Mastication:   []Spillage Left:   []Spillage Right:  []Pocketing Left:   []Pocketing Right:   []Decreased Anterior to Posterior Transit:   []Suspected Premature Bolus Loss:   []Lingual/Palatal Residue:   []Other:     Pharyngeal Phase: []WFL [x]Mild   [] Moderate  []Severe  []To be assessed   [x]Delayed Swallow:   []Suspected Pharyngeal Pooling:   [x]Decreased Laryngeal Elevation:   []Absent Swallow:  []Wet Vocal Quality:   []Throat Clearing-Immediate:   []Throat Clearing-Delayed:   []Cough-Immediate:   [x]Cough-Delayed:  []Change in Vital Signs:  []Suspected Delayed Pharyngeal Clearing:  []Other:       Eating Assistance:  [x]Independent  [x]Setup or clean-up assistance   [] Supervision or touching assistance   [] Partial or moderate assistance   [] Substantial or maximal assistance  [] Dependent       EDUCATION:   Provided education regarding role of SLP, results of assessment, recommendations and general speech pathology plan of care. [x] Pt verbalized understanding and agreement   [] Pt requires ongoing learning   [] No evidence of comprehension     If patient discharges prior to next visit, this note will serve as discharge. Timed Code Minutes: 0  Total Treatment Minutes: 20     Electronically signed by:    Burgess Elena M.A.  CCC-SLP S.P. [id number]  Speech-Language Pathologist [phone]  8/16/2021 3:37 PM

## 2021-08-16 NOTE — PROGRESS NOTES
Patient was alert and oriented this morning content no pain. Weaned patient from 4L to 1L. Patient got cleaned up the bath wipes, newman ,cares, linen change, and teeth brushed. Pt ate 100% of breakfast. Shift Assessment completed. Call light in reach and bed alarm on. Dressing barrera on leg wound completed. Wound was weepy of yellow drainage. Called and updated son on care.

## 2021-08-16 NOTE — CARE COORDINATION
Discharge Planning Assessment  Readmission risk score 13%  RN discharge planner met with patient/ (and family member) to discuss reason for admission, current living situation, and potential needs at the time of discharge    Demographics/Insurance verified Yes    Current type of dwelling: single level home    Patient from ECF/SW confirmed with:n/a    Living arrangements:lives alone, has supportive family    Level of function/Support:independent    PCP: Griselda Cake    Last Visit to PCP: 2-3 months    DME:fall alert button, walker    Active with any community resources/agencies/skilled home care: not at present    Medication compliance issues:mail order from [address] [place] [address] or [employer] on [employer] issues that could impact healthcare: not identified    Tentative discharge plan: pending therapy evaluations may need HH    Discussed and provided facilities of choice if transition to a skilled nursing facility is required at the time of discharge      Discussed with patient and/or family that on the day of discharge home tentative time of discharge will be between 10 AM and noon.     Transportation at the time of discharge: family    URIAH GallegoN, CCM, RN  [facility]  [phone]

## 2021-08-16 NOTE — PROGRESS NOTES
Patient's son is asking for a call from the physician to get an update. Message sent to Dr. Elina Steward with family member's name and phone number.

## 2021-08-17 ENCOUNTER — APPOINTMENT (OUTPATIENT)
Dept: CT IMAGING | Age: 86
DRG: 177 | End: 2021-08-17
Payer: MEDICARE

## 2021-08-17 ENCOUNTER — APPOINTMENT (OUTPATIENT)
Dept: GENERAL RADIOLOGY | Age: 86
DRG: 177 | End: 2021-08-17
Payer: MEDICARE

## 2021-08-17 LAB
ANION GAP SERPL CALCULATED.3IONS-SCNC: 8 MMOL/L (ref 3–16)
BUN BLDV-MCNC: 19 MG/DL (ref 7–20)
CALCIUM SERPL-MCNC: 9.2 MG/DL (ref 8.3–10.6)
CHLORIDE BLD-SCNC: 103 MMOL/L (ref 99–110)
CO2: 26 MMOL/L (ref 21–32)
CREAT SERPL-MCNC: 0.8 MG/DL (ref 0.8–1.3)
GFR AFRICAN AMERICAN: >60
GFR NON-AFRICAN AMERICAN: >60
GLUCOSE BLD-MCNC: 138 MG/DL (ref 70–99)
GLUCOSE BLD-MCNC: 151 MG/DL (ref 70–99)
GLUCOSE BLD-MCNC: 192 MG/DL (ref 70–99)
GLUCOSE BLD-MCNC: 234 MG/DL (ref 70–99)
GLUCOSE BLD-MCNC: 255 MG/DL (ref 70–99)
HCT VFR BLD CALC: 34.8 % (ref 40.5–52.5)
HEMOGLOBIN: 11.8 G/DL (ref 13.5–17.5)
INR BLD: 2.1 (ref 0.88–1.12)
MCH RBC QN AUTO: 31.8 PG (ref 26–34)
MCHC RBC AUTO-ENTMCNC: 33.9 G/DL (ref 31–36)
MCV RBC AUTO: 93.9 FL (ref 80–100)
PDW BLD-RTO: 14.5 % (ref 12.4–15.4)
PERFORMED ON: ABNORMAL
PLATELET # BLD: 156 K/UL (ref 135–450)
PMV BLD AUTO: 7.7 FL (ref 5–10.5)
POTASSIUM SERPL-SCNC: 4 MMOL/L (ref 3.5–5.1)
PRO-BNP: 1473 PG/ML (ref 0–449)
PROTHROMBIN TIME: 24.5 SEC (ref 9.9–12.7)
RBC # BLD: 3.71 M/UL (ref 4.2–5.9)
SODIUM BLD-SCNC: 137 MMOL/L (ref 136–145)
WBC # BLD: 6.7 K/UL (ref 4–11)

## 2021-08-17 PROCEDURE — 6370000000 HC RX 637 (ALT 250 FOR IP): Performed by: INTERNAL MEDICINE

## 2021-08-17 PROCEDURE — 85027 COMPLETE CBC AUTOMATED: CPT

## 2021-08-17 PROCEDURE — 97161 PT EVAL LOW COMPLEX 20 MIN: CPT

## 2021-08-17 PROCEDURE — 97530 THERAPEUTIC ACTIVITIES: CPT

## 2021-08-17 PROCEDURE — 83880 ASSAY OF NATRIURETIC PEPTIDE: CPT

## 2021-08-17 PROCEDURE — 71250 CT THORAX DX C-: CPT

## 2021-08-17 PROCEDURE — 92526 ORAL FUNCTION THERAPY: CPT

## 2021-08-17 PROCEDURE — 36415 COLL VENOUS BLD VENIPUNCTURE: CPT

## 2021-08-17 PROCEDURE — 74230 X-RAY XM SWLNG FUNCJ C+: CPT

## 2021-08-17 PROCEDURE — 97165 OT EVAL LOW COMPLEX 30 MIN: CPT

## 2021-08-17 PROCEDURE — 94761 N-INVAS EAR/PLS OXIMETRY MLT: CPT

## 2021-08-17 PROCEDURE — 6360000002 HC RX W HCPCS: Performed by: HOSPITALIST

## 2021-08-17 PROCEDURE — 80048 BASIC METABOLIC PNL TOTAL CA: CPT

## 2021-08-17 PROCEDURE — 2580000003 HC RX 258: Performed by: HOSPITALIST

## 2021-08-17 PROCEDURE — 2060000000 HC ICU INTERMEDIATE R&B

## 2021-08-17 PROCEDURE — 2700000000 HC OXYGEN THERAPY PER DAY

## 2021-08-17 PROCEDURE — 97535 SELF CARE MNGMENT TRAINING: CPT

## 2021-08-17 PROCEDURE — 94640 AIRWAY INHALATION TREATMENT: CPT

## 2021-08-17 PROCEDURE — 85610 PROTHROMBIN TIME: CPT

## 2021-08-17 PROCEDURE — 92611 MOTION FLUOROSCOPY/SWALLOW: CPT

## 2021-08-17 PROCEDURE — 6370000000 HC RX 637 (ALT 250 FOR IP): Performed by: HOSPITALIST

## 2021-08-17 RX ORDER — METOPROLOL SUCCINATE 50 MG/1
100 TABLET, EXTENDED RELEASE ORAL DAILY
Status: DISCONTINUED | OUTPATIENT
Start: 2021-08-17 | End: 2021-08-19 | Stop reason: HOSPADM

## 2021-08-17 RX ORDER — WARFARIN SODIUM 5 MG/1
5 TABLET ORAL DAILY
Status: DISCONTINUED | OUTPATIENT
Start: 2021-08-17 | End: 2021-08-19 | Stop reason: DRUGHIGH

## 2021-08-17 RX ADMIN — INSULIN LISPRO 1 UNITS: 100 INJECTION, SOLUTION INTRAVENOUS; SUBCUTANEOUS at 17:53

## 2021-08-17 RX ADMIN — WARFARIN SODIUM 5 MG: 5 TABLET ORAL at 17:53

## 2021-08-17 RX ADMIN — TAMSULOSIN HYDROCHLORIDE 0.4 MG: 0.4 CAPSULE ORAL at 08:04

## 2021-08-17 RX ADMIN — PREDNISOLONE ACETATE 1 DROP: 10 SUSPENSION/ DROPS OPHTHALMIC at 19:11

## 2021-08-17 RX ADMIN — ATORVASTATIN CALCIUM 10 MG: 10 TABLET, FILM COATED ORAL at 21:14

## 2021-08-17 RX ADMIN — BRIMONIDINE TARTRATE, TIMOLOL MALEATE 1 DROP: 2; 5 SOLUTION/ DROPS OPHTHALMIC at 06:54

## 2021-08-17 RX ADMIN — METOPROLOL SUCCINATE 100 MG: 50 TABLET, EXTENDED RELEASE ORAL at 09:42

## 2021-08-17 RX ADMIN — DORZOLAMIDE HCL 1 DROP: 20 SOLUTION/ DROPS OPHTHALMIC at 19:11

## 2021-08-17 RX ADMIN — INSULIN LISPRO 1 UNITS: 100 INJECTION, SOLUTION INTRAVENOUS; SUBCUTANEOUS at 08:06

## 2021-08-17 RX ADMIN — DORZOLAMIDE HCL 1 DROP: 20 SOLUTION/ DROPS OPHTHALMIC at 06:54

## 2021-08-17 RX ADMIN — PREDNISOLONE ACETATE 1 DROP: 10 SUSPENSION/ DROPS OPHTHALMIC at 14:55

## 2021-08-17 RX ADMIN — INSULIN LISPRO 2 UNITS: 100 INJECTION, SOLUTION INTRAVENOUS; SUBCUTANEOUS at 12:22

## 2021-08-17 RX ADMIN — CEFEPIME HYDROCHLORIDE 2000 MG: 2 INJECTION, POWDER, FOR SOLUTION INTRAVENOUS at 01:30

## 2021-08-17 RX ADMIN — DILTIAZEM HYDROCHLORIDE 120 MG: 120 CAPSULE, EXTENDED RELEASE ORAL at 08:04

## 2021-08-17 RX ADMIN — PREDNISOLONE ACETATE 1 DROP: 10 SUSPENSION/ DROPS OPHTHALMIC at 06:54

## 2021-08-17 RX ADMIN — CEFEPIME HYDROCHLORIDE 2000 MG: 2 INJECTION, POWDER, FOR SOLUTION INTRAVENOUS at 13:02

## 2021-08-17 RX ADMIN — BRIMONIDINE TARTRATE, TIMOLOL MALEATE 1 DROP: 2; 5 SOLUTION/ DROPS OPHTHALMIC at 19:11

## 2021-08-17 RX ADMIN — INSULIN LISPRO 2 UNITS: 100 INJECTION, SOLUTION INTRAVENOUS; SUBCUTANEOUS at 21:14

## 2021-08-17 RX ADMIN — Medication 10 ML: at 21:14

## 2021-08-17 RX ADMIN — Medication 10 ML: at 08:04

## 2021-08-17 RX ADMIN — DORZOLAMIDE HCL 1 DROP: 20 SOLUTION/ DROPS OPHTHALMIC at 14:55

## 2021-08-17 RX ADMIN — IPRATROPIUM BROMIDE AND ALBUTEROL SULFATE 1 AMPULE: .5; 3 SOLUTION RESPIRATORY (INHALATION) at 09:14

## 2021-08-17 ASSESSMENT — PAIN SCALES - GENERAL
PAINLEVEL_OUTOF10: 0

## 2021-08-17 NOTE — PLAN OF CARE
Problem: Falls - Risk of:  Goal: Will remain free from falls  Description: Will remain free from falls  8/17/2021 0953 by Rebecca Warren RN  Outcome: Ongoing     Problem: Falls - Risk of:  Goal: Absence of physical injury  Description: Absence of physical injury  8/17/2021 0953 by Rebecca Warren RN  Outcome: Ongoing     Problem: Skin Integrity:  Goal: Will show no infection signs and symptoms  Description: Will show no infection signs and symptoms  8/17/2021 0953 by Rebecca Warren RN  Outcome: Ongoing     Problem: Skin Integrity:  Goal: Absence of new skin breakdown  Description: Absence of new skin breakdown  8/17/2021 0953 by Rebecca Warren RN  Outcome: Ongoing     Problem: Serum Glucose Level - Abnormal:  Goal: Ability to maintain appropriate glucose levels has stabilized  Description: Ability to maintain appropriate glucose levels has stabilized  8/17/2021 0953 by Rebecca Warren RN  Outcome: Ongoing

## 2021-08-17 NOTE — CARE COORDINATION
[facility] Wound Ostomy Continence Nurse  Consult Note       NAME:  Megan Melendrez3 RECORD NUMBER:  [id number]  AGE: 80 y.o.    GENDER: male  : 1930  TODAY'S DATE:  2021    Subjective   Reason for WOCN Evaluation and Assessment: skin tear posterior lower left leg      Heather Kahn is a 80 y.o. male referred by:   [x] Physician  [x] Nursing  [] Other:     Wound Identification:  Wound Type: skin tear  Contributing Factors: none    Wound History: present on admission  Current Wound Care Treatment:  Mepitel, hydrofiber silver, abd, kerlix, ace bandage    Patient Goal of Care:  [x] Wound Healing  [] Odor Control  [] Palliative Care  [] Pain Control   [] Other:         PAST MEDICAL HISTORY        Diagnosis Date    [facility]     Arthritis     Atrial fibrillation ([facility]     CAD (coronary artery disease)     Cancer of colon ([facility]     Glaucoma     Dr. Mikayla Chacon Gout, unspecified     Hypertension     Hypertrophy of prostate without urinary obstruction and other lower urinary tract symptoms (LUTS)     Other and unspecified hyperlipidemia     Type II or unspecified type diabetes mellitus without mention of complication, uncontrolled        PAST SURGICAL HISTORY    Past Surgical History:   Procedure Laterality Date    ABDOMEN SURGERY      colon    CAROTID ENDARTERECTOMY Left 13    CATARACT REMOVAL      left    COLON SURGERY      COLONOSCOPY      CORONARY ANGIOPLASTY WITH STENT PLACEMENT      6 stents  05    CYSTOSCOPY  3-27-14    EYE SURGERY Left 2018    glacoma open angle 0S baerveldt implant 0S    EYE SURGERY Left 2018    posterior sclerotomy and aspiartion choroidal fluid     EYE SURGERY Left     drain    JOINT REPLACEMENT  2010    right knee    OTHER SURGICAL HISTORY      post-op reaction to pain med- very combative    REVISION TOTAL KNEE ARTHROPLASTY Right 2011    Tibial liner exchange & synovectomy       FAMILY HISTORY    Family History tablets) coumadin every evening until directed otherwise by Dr. Estrada Vanessa (Patient taking differently: Take 5 mg by mouth daily ) 90 tablet 3    brimonidine-timolol (COMBIGAN) 0.2-0.5 % ophthalmic solution Place 1 drop into the right eye every 12 hours       [DISCONTINUED] glimepiride (AMARYL) 1 MG tablet TAKE 1 TABLET EVERY MORNING 90 tablet 0    [DISCONTINUED] metoprolol succinate (TOPROL XL) 100 MG extended release tablet TAKE 1/2 TABLET EVERY DAY 45 tablet 0       Objective    BP (!) 140/72   Pulse 85   Temp 97.5 °F (36.4 °C) (Oral)   Resp 16   Ht 5' 11\" (1.803 m)   Wt 216 lb 0.8 oz (98 kg)   SpO2 95%   BMI 30.13 kg/m²     LABS:  WBC:    Lab Results   Component Value Date    WBC 6.7 08/17/2021     H/H:    Lab Results   Component Value Date    HGB 11.8 08/17/2021    HCT 34.8 08/17/2021     PTT:    Lab Results   Component Value Date    APTT 29.6 03/27/2014   [APTT}  PT/INR:    Lab Results   Component Value Date    PROTIME 24.5 08/17/2021    PROTIME 31 09/13/2018    INR 2.10 08/17/2021     HgBA1c:    Lab Results   Component Value Date    LABA1C 7.3 05/25/2021       Assessment   Gutierrez Risk Score: Gutierrez Scale Score: 17    Patient Active Problem List   Diagnosis Code    Osteoarthritis of knee M17.10    Atrial fibrillation (HCC) I48.91    CAD (coronary artery disease) I25.10    Hyperlipidemia E78.5    HTN (hypertension) I10    Type 2 diabetes mellitus without complication, without long-term current use of insulin (HCC) E11.9    Carotid artery disease ([address] I77.9    Cataract H26.9    Glaucoma H40.9    Benign prostatic hyperplasia with urinary frequency N40.1, R35.0    Acute respiratory failure ([address] J96.00       Measurements:  Incision 12/14/10 Knee Right (Active)   Number of days: 3899       Incision 08/17/11 Knee Right (Active)   Number of days: 3653       Incision 02/24/14 Neck Left (Active)   Number of days: 2731       Incision 03/02/18 Eye Left (Active)   Number of days: 1264       Incision 04/13/18 Eye Left (Active)   Number of days: 2175       Wound 08/14/21 Tibial Left skin tear; flap intact (Active)   Wound Etiology Skin Tear 08/17/21 0752   Dressing Status Clean;Dry; Intact 08/17/21 0752   Wound Cleansed Not Cleansed 08/17/21 0435   Dressing/Treatment Other (comment) 08/17/21 0752   Drainage Amount Moderate 08/16/21 1256   Drainage Description Yellow 08/16/21 1256   Odor None 08/17/21 0752   Number of days: 2            Patient has skin tear to posterior lower left leg. Patient reports he was trying to get out of a truck and misjudged the distance scrapping his posterior leg.  of the  truck gave first aid, laying skin flap over wound. Linear horizontal wound noted. Rohini -wound bruising and clear drainage seen. Patient does have some swelling to this leg. Will continue current treatment. Response to treatment:  Well tolerated by patient. Pain Assessment:  Severity:  0 / 10  Quality of pain: N/A  Wound Pain Timing/Severity: none  Premedicated: No    Plan   Plan of Care: Wound 08/14/21 Tibial Left skin tear; flap intact-Dressing/Treatment: Other (comment) (Mepitel,silver dressing, foam, kerlex, ace wraps)    Specialty Bed Required : No   [] Low Air Loss   [] Pressure Redistribution  [] Fluid Immersion  [] Bariatric  [] Total Pressure Relief  [] Other:     Current Diet: ADULT DIET;  Regular  Dietician consult:  No    Discharge Plan:  Placement for patient upon discharge: home with support    Patient appropriate for Outpatient [address]: No    Referrals:  []   [] [address] [place] [address]  [] Supplies  [] Other    Patient/Caregiver Teaching:  Level of patient/caregiver understanding able to:   [] Indicates understanding       [] Needs reinforcement  [] Unsuccessful      [] Verbal Understanding  [] Demonstrated understanding       [] No evidence of learning  [] Refused teaching         [] N/A       Electronically signed by  TOM Ojeda, RN  [facility] 8/17/2021 at 5:36 PM

## 2021-08-17 NOTE — PROGRESS NOTES
Physical Therapy    Facility/Department: [address]  Initial Assessment    NAME: Lissette Comer  : 1930  MRN: [id number]    Date of Service: 2021    Discharge Recommendations:  Lissette Comer scored a 21/24 on the AM-PAC short mobility form. Current research shows that an AM-PAC score of 18 or greater is typically associated with a discharge to the patient's home setting. Based on the patient's AM-PAC score and their current functional mobility deficits, it is recommended that the patient have 2-3 sessions per week of Physical Therapy at d/c to increase the patient's independence. At this time, this patient demonstrates the endurance and safety to discharge home with home PT and a follow up treatment frequency of 2-3x/wk. Please see assessment section for further patient specific details. If patient discharges prior to next session this note will serve as a discharge summary. Please see below for the latest assessment towards goals. HOME HEALTH CARE: LEVEL 1 STANDARD    - Initial home health evaluation to occur within 24-48 hours, in patient home   - Therapy to evaluate with goal of regaining prior level of functioning   - Therapy to evaluate if patient has [address] needs for personal care      2-3 sessions per week   PT Equipment Recommendations  Equipment Needed: No    Assessment   Body structures, Functions, Activity limitations: Decreased functional mobility ; Decreased strength;Decreased posture  Assessment: Pt presenting slightly below his baseline due to decreased mobility while in hospital.  Struggled a bit with STS transfers but did not require physical assistance. Able to walk a lengthy distance using RW but HR increased to 150s. Continued skilled PT to promote return towards PLOF.   Treatment Diagnosis: decreased functional mobility  Prognosis: Good  Decision Making: Low Complexity  PT Education: Goals;PT Role;Plan of Care  Patient Education: verbalized understanding  Barriers to Learning: vision  REQUIRES PT FOLLOW UP: Yes  Activity Tolerance  Activity Tolerance: Patient Tolerated treatment well       Patient Diagnosis(es): The primary encounter diagnosis was Acute respiratory failure with hypoxia ([address] Diagnoses of Supratherapeutic INR, Pneumonia due to infectious organism, unspecified laterality, unspecified part of lung, and General weakness were also pertinent to this visit. has a past medical history of Acute MI ([facility] Arthritis, Atrial fibrillation ([address] CAD (coronary artery disease), Cancer of colon ([facility] Glaucoma, Gout, unspecified, Hypertension, Hypertrophy of prostate without urinary obstruction and other lower urinary tract symptoms (LUTS), Other and unspecified hyperlipidemia, and Type II or unspecified type diabetes mellitus without mention of complication, uncontrolled. has a past surgical history that includes Colon surgery; Cataract removal; Coronary angioplasty with stent; other surgical history; Revision total knee arthroplasty (Right, 08/17/2011); Abdomen surgery (1992); Colonoscopy; Carotid endarterectomy (Left, 2/24/13); Cystocopy (3-27-14); joint replacement (2010); Eye surgery (Left, 03/02/2018); Eye surgery (Left, 04/13/2018); and eye surgery (Left, 2009). Restrictions  Restrictions/Precautions  Restrictions/Precautions: Fall Risk (high fall risk)  Position Activity Restriction  Other position/activity restrictions: Jaron Baez is a 80 y.o. male with past medical history of previous MI, CAD, atrial fibrillation on warfarin, hypertension, diabetes and hyperlipidemia who presents to the ED with complaint of weakness and fatigue. Patient arrived from home by EMS with complaints of weakness and fatigue. Fell this morning. Unable to get up and children wanted patient sent to the emergency department for further evaluation and treatment. Patient denies any injury from the fall this morning.   Vision/Hearing  Vision: Impaired  Vision Exceptions: Wears glasses for reading;Wears glasses for distance  Hearing: Within functional limits     Subjective  General  Chart Reviewed: Yes  Family / Caregiver Present: No  Diagnosis: CAD  General Comment  Comments: supine in bed at arrival  Subjective  Subjective: agreed to evaluation, denied pain, wanting to discharge home  Pain Screening  Patient Currently in Pain: Denies          Orientation  Orientation  Overall Orientation Status: Within Functional Limits  Social/Functional History  Social/Functional History  Lives With: Alone  Type of Home: House  Home Layout: Two level, Able to Live on Main level with bedroom/bathroom  Home Access: Ramped entrance  Bathroom Shower/Tub: Walk-in shower  Bathroom Toilet: Standard  Bathroom Equipment: Grab bars in [facility], Rolling walker  ADL Assistance: [facility]: Needs assistance (pt does own laundry, family does grocery shopping, MOWs, cleaning services x1/week)  Ambulation Assistance: Independent  Transfer Assistance: Independent  Active : No  Occupation: Retired  Additional Comments: pt reports one fall in 91 years,  Cognition        Objective     Observation/Palpation  Posture: Fair  Observation: protracted scapula, flexed trunk when ambulating, cues for upright posture    AROM RLE (degrees)  RLE AROM: WFL  AROM LLE (degrees)  LLE AROM : WFL  Strength RLE  Strength RLE: WFL  Strength LLE  Strength LLE: WFL        Bed mobility  Supine to Sit: Stand by assistance  Scooting: Stand by assistance  Comment: HOB elevated  Transfers  Sit to Stand: Contact guard assistance  Stand to sit: Contact guard assistance  Comment: vc for hand placement and fwd trunk lean, several tries to complete stand to RW, x2 from EOB  Ambulation  Ambulation?: Yes  Ambulation 1  Surface: level tile  Device: Rolling Walker  Quality of Gait: flexed posture, decreased foot clearance  Gait Deviations: Decreased step height  Distance: 350 ft  Comments: HR increased to 150s but recovered quickly with a seated rest break  Stairs/Curb  Stairs?: No     Balance  Posture: Fair  Sitting - Static: Good  Sitting - Dynamic: Good  Standing - Static: Good  Standing - Dynamic: Good;-        Plan   Plan  Times per week: 3-5x/wk  Current Treatment Recommendations: Strengthening, Balance Training, Functional Mobility Training, Transfer Training, Gait Training, Endurance Training  Safety Devices  Type of devices: All fall risk precautions in place, Call light within reach, Chair alarm in place, Nurse notified, Left in chair  Restraints  Initially in place: No    G-Code       OutComes Score                                                  AM-PAC Score  AM-PAC Inpatient Mobility Raw Score : 21 (08/17/21 1102)  AM-PAC Inpatient T-Scale Score : 50.25 (08/17/21 1102)  Mobility Inpatient CMS 0-100% Score: 28.97 (08/17/21 1102)  Mobility Inpatient CMS G-Code Modifier : Henrietta Crutch (08/17/21 1102)          Goals  Short term goals  Time Frame for Short term goals: to be met by discharge  Short term goal 1: Independent with bed mobility  Short term goal 2:  Independent with transfers  Short term goal 3: Ambulate 150 ft with RW mod I  Patient Goals   Patient goals : to return home       Therapy Time   Individual Concurrent Group Co-treatment   Time In [phone]         Time Out 1012         Minutes 45         Timed Code Treatment Minutes: Meme 996, [id number]

## 2021-08-17 NOTE — PROGRESS NOTES
Pt ambulating around the jones with PT/OT. Received a call from monitor tech stating pt's rhythm in monitor was Vtach. Pt was in [address] for approximately 1.5 minutes with HR in 140s. Requested therapy to assist pt back to bed. Pt up in chair and resting. Pt is back on Afib on monitor and HR in 90s. Pt was asymptomatic. Cardiologist and Hospitalist notified via [employer].

## 2021-08-17 NOTE — PROCEDURES
[address] THERAPY  MODIFIED BARIUM SWALLOW EVALUATION    Patient's Name: [employer]. O.B: 6/9/1930  Medical Diagnosis: Acute respiratory failure (HCC) [J96.00]  General weakness [R53.1]  Supratherapeutic INR [R79.1]  Acute respiratory failure with hypoxia ([address] [J96.01]  Pneumonia due to infectious organism, unspecified laterality, unspecified part of lung [J18.9]  Treatment Diagnosis: Dysphagia    Ordering MD: Dr. Yolanda Puckett   Radiologist: Dr. Ulises Garland  Date of Evaluation: 8/17/2021  Type of Study: Modified Barium Swallowing Study (MBS)  Diet Prior to Study: Regular texture diet with thin liquids   Pain Level: denies     Impression:  Modified Barium Swallow evaluation completed on 8/17/2021. Results indicate mild/moderate oropharyngeal dysphagia. Pt demonstrated delayed swallow initiation with delayed/decreased pharyngeal clearing resulting in pharyngeal stasis after the swallow. Prolonged pharyngeal pooling was noted at times (I.e., greater than 30 seconds) with cues required to swallow. Decreased pharyngeal sensation was suspected as pt demonstrated prolonged delay in swallow initiation with material pooled to the level of the pyriforms and pharyngeal stasis after the swallow with inconsistent timely reflexive clearing swallows. No aspiration was viewed during this study, intermittent shallow laryngeal penetration was noted with thin liquids. Given increased time pt seemingly tolerate regular solids and thin liquids, he benefited from small sips/bites, cues to double swallow and increased time. Pt demonstrates increased risk for aspiration due to pharyngeal phase deficits. Following study pt demonstrated wet vocal quality, suspect due to pharyngeal stasis, he benefited from cues to cough and re-swallow. See below for recommendations. Education was provided re; safe swallow strategies, results of Modified Barium Swallow and recommendations for dysphagia tx.  Pt verbalized hesitation to work with speech therapy but agreement to trial tx. Aspiration/Penetration Risk:  Mild due to deep pharyngeal pooling with delayed swallow initiation and pharyngeal stasis after the swallow     Recommendations:    Diet Level:   Regular texture diet (encourage pt to choose soft textures) with thin liquids     Strategies: Alternate solids/liquids , Upright as possible with all PO intake , No straws , Swallow 2 times per bite , Eat/feed slowly, Remain upright 30-45 min   Treatments: Speech Therapy for dysphagia treatment  Goals:  1. Pt will functionally tolerate recommended diet level without s/s of aspiration/penetration   2. Pt/family will demonstrate understanding of results Modified Barium Swallow Study, risk for aspiration, rationale for diet level and compensatory strategies   3. Pt will utilize appropriate compensatory strategies as indicated with min with cues   4.   Pt will demonstrate improved sensory motor function via targeted exercises and appropriate treatment modalities     Consistencies given: Thin, Mildly Thick (Nectar) Liquids, Moderately Thick (honey) Liquids, Puree, Soft solid, Solid    Oral Phase  -Reduced bolus control  -Premature bolus loss to pharynx  -Slow prolonged chewing/mashing with complete re-collection   -Reduced A-P bolus propulsion    Pharyngeal Phase  -Delayed swallow onset-prolonged delays noted at times with liquids and puree  -Pooling to the pyriforms with liquids, puree and soft solids   -Decreased anterior hyoid movement- partial anterior movement   -Partial epiglottic inversion   -Incomplete laryngeal vestibular closure   -Diminished pharyngeal stripping wave   -Decreased pharyngoesophageal segment opening; partial distension/partial duration; partial obstruction of flow   -Reduced tongue base retraction; narrow column of contrast or air between TB and PW   -Moderate pharyngeal stasis- required cues at times to clear residue, intermittent delayed clearing swallows were

## 2021-08-17 NOTE — PROGRESS NOTES
Patient seen and doing well.  Reviewed with son    Anticipate discharge today with weekly INR testing when resuming coumadin    Home dose of toprol xl 100 mg to resume today    Will need f/up with Dr Ximena Suero and will see in office next week

## 2021-08-17 NOTE — PROGRESS NOTES
Hospitalist Progress Note      PCP: Mya Tirado MD    Date of Admission: 8/14/2021    Chief Complaint: SOB    Hospital Course: 79 yo M with history of CAD, Afib on Coumadin, DM2, BPH who came to ER with cough and SOB. Admitted as inpatient for aspiration PNA, acute respiratory failure with hypoxia and Afib with RVR. Noted to have drainage from legs. Followed by Cardiology. COVID negative. Went into NSVT for 90 seconds on 8/17/21    Subjective: Patient on 2 L NC. No CP, HA or abdominal pain. Coughing. No SOB. Upset because wants to go home alone today. Medications:  Reviewed    Infusion Medications    dilTIAZem Stopped (08/15/21 2045)    dextrose       Scheduled Medications    metoprolol succinate  100 mg Oral Daily    warfarin  5 mg Oral Daily    ipratropium-albuterol  1 ampule Inhalation Q4H WA    bimatoprost  1 drop Right Eye Nightly    brimonidine-timolol  1 drop Right Eye Q12H    dorzolamide  1 drop Right Eye TID    prednisoLONE acetate  1 drop Left Eye TID    dilTIAZem  120 mg Oral Daily    atorvastatin  10 mg Oral Nightly    tamsulosin  0.4 mg Oral Daily    cefepime  2,000 mg Intravenous Q12H    sodium chloride flush  5-40 mL Intravenous 2 times per day    insulin lispro  0-6 Units Subcutaneous TID WC    insulin lispro  0-3 Units Subcutaneous Nightly     PRN Meds: sodium chloride flush, polyethylene glycol, acetaminophen **OR** acetaminophen, perflutren lipid microspheres, glucose, dextrose, glucagon (rDNA), dextrose      Intake/Output Summary (Last 24 hours) at 8/17/2021 1745  Last data filed at 8/17/2021 1506  Gross per 24 hour   Intake 736.7 ml   Output 3225 ml   Net -2488.3 ml       Physical Exam Performed:    BP (!) 140/72   Pulse 85   Temp 97.5 °F (36.4 °C) (Oral)   Resp 16   Ht 5' 11\" (1.803 m)   Wt 216 lb 0.8 oz (98 kg)   SpO2 95%   BMI 30.13 kg/m²     General appearance: No apparent distress, appears stated age and cooperative.   HEENT: Pupils equal, round, and reactive to light. Conjunctivae/corneas clear. Neck: Supple, with full range of motion. No jugular venous distention. Trachea midline. Respiratory:  Normal respiratory effort. Clear to auscultation, bilaterally without Rales/Wheezes/Rhonchi. Cardiovascular: Regular rate and rhythm with normal S1/S2 without murmurs, rubs or gallops. Abdomen: Soft, non-tender, non-distended with normal bowel sounds. Musculoskeletal: No clubbing, cyanosis or edema bilaterally. Full range of motion without deformity. Skin: Skin color, texture, turgor normal.  No rashes or lesions. Neurologic:  Neurovascularly intact without any focal sensory/motor deficits. Cranial nerves: II-XII intact, grossly non-focal.  Psychiatric: Alert and oriented, thought content appropriate, normal insight  Capillary Refill: Brisk,3 seconds, normal   Peripheral Pulses: +2 palpable, equal bilaterally       Labs:   Recent Labs     08/15/21  0533 08/16/21  0628 08/17/21  0445   WBC 13.3* 8.0 6.7   HGB 12.4* 11.8* 11.8*   HCT 37.3* 34.5* 34.8*    146 156     Recent Labs     08/15/21  0533 08/16/21  0628 08/17/21  0445    137 137   K 4.0 3.7 4.0    104 103   CO2 28 24 26   BUN 17 17 19   CREATININE 0.8 0.7* 0.8   CALCIUM 9.2 9.0 9.2     No results for input(s): AST, ALT, BILIDIR, BILITOT, ALKPHOS in the last 72 hours. Recent Labs     08/15/21  0533 08/16/21  0628 08/17/21  0445   INR 6.52* 3.31* 2.10*     Recent Labs     08/14/21  1850   TROPONINI <0.01       Urinalysis:      Lab Results   Component Value Date    NITRU POSITIVE 08/14/2021    WBCUA 767 08/14/2021    BACTERIA 2+ 08/14/2021    RBCUA 5-10 08/14/2021    BLOODU MODERATE 08/14/2021    SPECGRAV 1.022 08/14/2021    GLUCOSEU >=1000 08/14/2021    GLUCOSEU NEGATIVE 08/02/2011       Radiology:  CT CERVICAL SPINE WO CONTRAST   Final Result   No acute fracture or subluxation. C4-5 mild degenerative anterolisthesis. Overall moderate multilevel spondylosis. CT HEAD WO CONTRAST   Final Result   No acute intracranial abnormality. Stable pattern of atrophy and microvascular ischemic disease in the cerebral   white matter. XR TIBIA FIBULA LEFT (2 VIEWS)   Final Result   No acute osseous injury or osteomyelitis. XR CHEST PORTABLE   Final Result   Interstitial and alveolar opacities in the mid and lower lungs, equivocal for   pulmonary edema or pneumonia. Fluoroscopy modified barium swallow with video    (Results Pending)   CT CHEST WO CONTRAST    (Results Pending)   MRI BRAIN WO CONTRAST    (Results Pending)           Assessment/Plan:    Active Hospital Problems    Diagnosis     HTN (hypertension) [I10]      Priority: High    CAD (coronary artery disease) [I25.10]      Priority: High    Atrial fibrillation (HCC) [I48.91]      Priority: High    Hyperlipidemia [E78.5]      Priority: High    Acute respiratory failure (HCC) [J96.00]     Carotid artery disease ([facility] [I77.9]     Type 2 diabetes mellitus without complication, without long-term current use of insulin ([facility] [facility] [E11.9]      1. Await MBS  2. PT/OT/SLP eval recommend home PT/OT/VNS/HHA/SW/SLP  3. Cont Cefepime for aspiration PNA and LLE cellulitis  4. Cont Wound care  5. Wean off O2 as tolerated  6. Coumadin goal INR 2-3 for Afib  7. DNR-CCA  8. Palliative care consult    DVT Prophylaxis: Coumadin  Diet: ADULT DIET; Regular  Code Status: DNR-CCA    PT/OT Eval Status: Following    Dispo - home with home PT/OT/VNS/SLP/HHA/SW    Discussed with patient, nursing and CM. He went back into NSVT today. I think maximizing him now is best course of action. He is likely to decline at home in future again.     Leyda Ann MD

## 2021-08-17 NOTE — ACP (ADVANCE CARE PLANNING)
Advanced Care Planning Note. Purpose of Encounter: Advanced care planning in light of CAD  Parties In Attendance: Patient, daughter (who is PT), son (who is Pharmacist)  Decisional Capacity: Yes  Subjective: Patient is coughing  Objective: Cr 0.7  Goals of Care Determination: Patient wants limited support (NO CPR, no vent, no HD, no trach, no PEG, no surgery, NO SNF)  Plan:  IV Abx. Cardio consult. PT/OT/SLP eval.  MBS  Code Status: DNR CCA   Time spent on Advanced care Plannin minutes  Advanced Care Planning Documents: Completed advanced directives on chart, children share the POA.     Kushal Garza MD  2021 2:03 PM

## 2021-08-17 NOTE — CARE COORDINATION
The Plan for Transition of Care is related to the following treatment goals:  Home care    The Patient and/or patient representative son/daughter was provided with a choice of provider and agrees   with the discharge plan. [x] Yes [] No    Freedom of choice list was provided with basic dialogue that supports the patient's individualized plan of care/goals, treatment preferences and shares the quality data associated with the providers. [x] Yes [] No  Referral initiated to [facility]. per family request pending a HC order.

## 2021-08-17 NOTE — CONSULTS
Pharmacy to Dose Warfarin    Pharmacy consulted to dose warfarin for Afib. INR Goal: 2.0-3.0    INR today: 2.1    Assessment/Plan:  - INR therapeutic today after holding for 3 days  -INR therapeutic on home dose prior to admission   - Will resume home dose: 5 mg daily     Pharmacy will continue to follow.     Cecelia BrownD  Pharmacy Resident

## 2021-08-17 NOTE — PROGRESS NOTES
Occupational Therapy   Occupational Therapy Initial Assessment  Date: 2021   Patient Name: Radha Feng  MRN: [id number]     : 1930    Date of Service: 2021    Discharge Recommendations:  Radha Feng scored a 18/24 on the AM-PAC ADL Inpatient form. Current research shows that an AM-PAC score of 18 or greater is typically associated with a discharge to the patient's home setting. Based on the patient's AM-PAC score, and their current ADL deficits, it is recommended that the patient have 2-3 sessions per week of Occupational Therapy at d/c to increase the patient's independence. At this time, this patient demonstrates the endurance and safety to discharge home with home health (home vs OP services) and a follow up treatment frequency of 2-3x/wk. Please see assessment section for further patient specific details. If patient discharges prior to next session this note will serve as a discharge summary. Please see below for the latest assessment towards goals. HOME HEALTH CARE: LEVEL 1 STANDARD    - Initial home health evaluation to occur within 24-48 hours, in patient home   - Therapy to evaluate with goal of regaining prior level of functioning   - Therapy to evaluate if patient has [address] needs for personal care       OT Equipment Recommendations  Equipment Needed: Yes  Mobility Devices: ADL Assistive Devices  ADL Assistive Devices: Shower Chair with back; Toileting - Raised Toilet Seat with arms    Assessment   Performance deficits / Impairments: Decreased functional mobility ; Decreased ADL status; Decreased high-level IADLs;Decreased strength  Assessment: pt not at baseline level of functioning and would benefit from ongoing skilled OT services in order to return to PLOF  Treatment Diagnosis: respiratory failure  Prognosis: Good  Decision Making: Low Complexity  History: pt lives alone with family support, independent with ADLs. stands to shower.  MOWs and cleaning services at baseline  Patient Education: eval, POC, discharge, DME-pt independent with education  Barriers to Learning: visual  REQUIRES OT FOLLOW UP: Yes  Activity Tolerance  Activity Tolerance: Patient Tolerated treatment well  Activity Tolerance:  with sustained Vtach. Pt asymptomatic. seated in chair after ambulation. HR lowering with time sitting, RN in room  Safety Devices  Safety Devices in place: Yes  Type of devices: All fall risk precautions in place;Nurse notified; Left in chair;Chair alarm in place;Call light within reach; Patient at risk for falls;Gait belt           Patient Diagnosis(es): The primary encounter diagnosis was Acute respiratory failure with hypoxia ([facility] [address] Diagnoses of Supratherapeutic INR, Pneumonia due to infectious organism, unspecified laterality, unspecified part of lung, and General weakness were also pertinent to this visit. has a past medical history of Acute MI ([facility] [facility] Arthritis, Atrial fibrillation ([facility] [address] CAD (coronary artery disease), Cancer of colon ([facility] [address] [facility] Glaucoma, Gout, unspecified, Hypertension, Hypertrophy of prostate without urinary obstruction and other lower urinary tract symptoms (LUTS), Other and unspecified hyperlipidemia, and Type II or unspecified type diabetes mellitus without mention of complication, uncontrolled. has a past surgical history that includes Colon surgery; Cataract removal; Coronary angioplasty with stent; other surgical history; Revision total knee arthroplasty (Right, 08/17/2011); Abdomen surgery (1992); Colonoscopy; Carotid endarterectomy (Left, 2/24/13); Cystocopy (3-27-14); joint replacement (2010); Eye surgery (Left, 03/02/2018); Eye surgery (Left, 04/13/2018); and eye surgery (Left, 2009).     Treatment Diagnosis: respiratory failure      Restrictions  Restrictions/Precautions  Restrictions/Precautions: Fall Risk (high fall risk)  Position Activity Restriction  Other position/activity restrictions: Feliberto White is a 80 y.o. male with past medical history of previous MI, CAD, atrial fibrillation on warfarin, hypertension, diabetes and hyperlipidemia who presents to the ED with complaint of weakness and fatigue. Patient arrived from home by EMS with complaints of weakness and fatigue. Fell this morning. Unable to get up and children wanted patient sent to the emergency department for further evaluation and treatment. Patient denies any injury from the fall this morning. Subjective   General  Chart Reviewed: Yes  Family / Caregiver Present: Yes (daughter arriving during session)  Diagnosis: acute respiratory failure  Subjective  Subjective: pt supine upon arrival and agreeable to OT eval, denies pain.  on 1 L O2  Patient Currently in Pain: Denies  Vital Signs  Pulse: 99  Heart Rate Source: Telemetry  Resp: 20  Patient Currently in Pain: Denies  Oxygen Therapy  SpO2: 90 %  Pulse Oximeter Device Mode: Continuous  Pulse Oximeter Device Location: Finger  O2 Device: Nasal cannula  O2 Flow Rate (L/min): 1 L/min  Social/Functional History  Social/Functional History  Lives With: Alone  Type of Home: House  Home Layout: Two level, Able to Live on Main level with bedroom/bathroom  Home Access: Ramped entrance  Bathroom Shower/Tub: Walk-in shower  Bathroom Toilet: Standard  Bathroom Equipment: Grab bars in shower  Home Equipment: Alert Button, Rolling walker  ADL Assistance: [address]: Needs assistance (pt does own laundry, family does grocery shopping, MOWs, cleaning services x1/week)  Ambulation Assistance: Independent  Transfer Assistance: Independent  Active : No  Occupation: Retired  Additional Comments: pt reports one fall in 91 years,       Objective   Vision: Impaired  Vision Exceptions: Wears glasses for reading;Wears glasses for distance  Hearing: Within functional limits    Orientation  Overall Orientation Status: Within Functional Limits  Observation/Palpation  Posture: Fair  Observation: protracted scapula, flexed trunk when ambulating, cues for upright posture  Balance  Sitting Balance: Supervision  Standing Balance: Contact guard assistance (CGA progressing to SBA)  Standing Balance  Time: ~15 minutes total  Activity: stance at sink for oral care. seated rest break. mobility in hallway ~350 ft with RW, cues for upright posture. small CATHLEEN  Functional Mobility  Functional - Mobility Device: Rolling Walker  Activity: Other; To/from bathroom  Assist Level: Contact guard assistance  Functional Mobility Comments: SPO2 between 88-94%  Toilet Transfers  Toilet - Technique:  (reports difficulty with transfers on/off toliet, educated on toliet raiser)  ADL  Grooming: Setup (d/t glaucoma, needed assist to put toothpaste on brush, educated on adaptive technique)  LE Dressing: Increased time to complete;Setup (donning socks and shoes)  Tone RUE  RUE Tone: Normotonic  Tone LUE  LUE Tone: Normotonic  Coordination  Coordination and Movement description: Decreased accuracy (from visual deficits)     Bed mobility  Supine to Sit: Stand by assistance  Scooting: Stand by assistance  Comment: HOB elevated  Transfers  Sit to stand: Contact guard assistance  Stand to sit: Contact guard assistance  Transfer Comments: cues for hand placement     Cognition  Overall Cognitive Status: WFL  Perception  Overall Perceptual Status: WFL  Perseveration: Perseverates during conversation     Sensation  Overall Sensation Status: WFL        LUE AROM (degrees)  LUE AROM : WFL  RUE AROM (degrees)  RUE AROM : WFL  LUE Strength  Gross LUE Strength: WFL  RUE Strength  Gross RUE Strength: WFL                   Plan   Plan  Times per week: 3-5  Times per day: Daily  Current Treatment Recommendations: Strengthening, Functional Mobility Training, Safety Education & Training, Self-Care / ADL    AM-PAC Score        AM-PAC Inpatient Daily Activity Raw Score: 18 (08/17/21 1033)  AM-PAC Inpatient ADL T-Scale Score : 38.66 (08/17/21 1033)  ADL Inpatient CMS 0-100% Score: 46.65 (08/17/21 1033)  ADL Inpatient CMS G-Code Modifier : CK (08/17/21 1033)    Goals  Short term goals  Time Frame for Short term goals: discharge  Short term goal 1: bed mobility independent  Short term goal 2: functional ADL transfer mod I  Short term goal 3: functional mobility with RW mod I  Short term goal 4: UB/LB ADLs mod I  Short term goal 5: tolieting mod I  Patient Goals   Patient goals : go home       Therapy Time   Individual Concurrent Group Co-treatment   Time In 0926         Time Out 1012         Minutes 46           Timed Code Treatment Minutes:   31 minutes    Total Treatment Minutes:  46 minutes      Caren Back OTR/L [id number]      Caren Back OT

## 2021-08-17 NOTE — PROGRESS NOTES
Hospitalist Progress Note      PCP: Nash Galvan MD    Date of Admission: 8/14/2021    Chief Complaint: SOB    Hospital Course: 79 yo M with history of CAD, Afib on Coumadin, DM2, BPH who came to ER with cough and SOB. Admitted as inpatient for aspiration PNA, acute respiratory failure with hypoxia and Afib with RVR. Noted to have drainage from legs. Followed by Cardiology. COVID negative. Subjective: Patient does not use home O2. Son and daughter at bedside. Patient lives alone. Knows he is 80. Refuses SNF. No CP, HA or fevers. Some cough. Medications:  Reviewed    Infusion Medications    dilTIAZem Stopped (08/16/21 1311)    dextrose       Scheduled Medications    metoprolol succinate  100 mg Oral Daily    warfarin  5 mg Oral Daily    ipratropium-albuterol  1 ampule Inhalation Q4H WA    bimatoprost  1 drop Right Eye Nightly    brimonidine-timolol  1 drop Right Eye Q12H    dorzolamide  1 drop Right Eye TID    prednisoLONE acetate  1 drop Left Eye TID    dilTIAZem  120 mg Oral Daily    atorvastatin  10 mg Oral Nightly    tamsulosin  0.4 mg Oral Daily    cefepime  2,000 mg Intravenous Q12H    sodium chloride flush  5-40 mL Intravenous 2 times per day    insulin lispro  0-6 Units Subcutaneous TID WC    insulin lispro  0-3 Units Subcutaneous Nightly     PRN Meds: sodium chloride flush, polyethylene glycol, acetaminophen **OR** acetaminophen, perflutren lipid microspheres, glucose, dextrose, glucagon (rDNA), dextrose      Intake/Output Summary (Last 24 hours) at 8/17/2021 1403  Last data filed at 8/17/2021 1011  Gross per 24 hour   Intake 530 ml   Output 3225 ml   Net -2695 ml       Physical Exam Performed:    /65   Pulse 96   Temp 97.8 °F (36.6 °C) (Oral)   Resp 17   Ht 5' 11\" (1.803 m)   Wt 216 lb 0.8 oz (98 kg)   SpO2 96%   BMI 30.13 kg/m²     General appearance: No apparent distress, appears stated age and cooperative.   HEENT: Pupils equal, round, and reactive to light. Conjunctivae/corneas clear. Neck: Supple, with full range of motion. No jugular venous distention. Trachea midline. Respiratory:  Normal respiratory effort. Clear to auscultation, bilaterally without Rales/Wheezes/Rhonchi. Cardiovascular: Regular rate and rhythm with normal S1/S2 without murmurs, rubs or gallops. Abdomen: Soft, non-tender, non-distended with normal bowel sounds. Musculoskeletal: No clubbing, cyanosis or edema bilaterally. Full range of motion without deformity. Skin: Skin color, texture, turgor normal.  No rashes or lesions. Neurologic:  Neurovascularly intact without any focal sensory/motor deficits. Cranial nerves: II-XII intact, grossly non-focal.  Psychiatric: Alert and oriented, thought content appropriate, normal insight  Capillary Refill: Brisk,3 seconds, normal   Peripheral Pulses: +2 palpable, equal bilaterally       Labs:   Recent Labs     08/15/21  0533 08/16/21  0628 08/17/21 0445   WBC 13.3* 8.0 6.7   HGB 12.4* 11.8* 11.8*   HCT 37.3* 34.5* 34.8*    146 156     Recent Labs     08/15/21  0533 08/16/21  0628 08/17/21  0445    137 137   K 4.0 3.7 4.0    104 103   CO2 28 24 26   BUN 17 17 19   CREATININE 0.8 0.7* 0.8   CALCIUM 9.2 9.0 9.2     No results for input(s): AST, ALT, BILIDIR, BILITOT, ALKPHOS in the last 72 hours. Recent Labs     08/15/21  0533 08/16/21  0628 08/17/21  0445   INR 6.52* 3.31* 2.10*     Recent Labs     08/14/21  1850   TROPONINI <0.01       Urinalysis:      Lab Results   Component Value Date    NITRU POSITIVE 08/14/2021    WBCUA 767 08/14/2021    BACTERIA 2+ 08/14/2021    RBCUA 5-10 08/14/2021    BLOODU MODERATE 08/14/2021    SPECGRAV 1.022 08/14/2021    GLUCOSEU >=1000 08/14/2021    GLUCOSEU NEGATIVE 08/02/2011       Radiology:  CT CERVICAL SPINE WO CONTRAST   Final Result   No acute fracture or subluxation. C4-5 mild degenerative anterolisthesis. Overall moderate multilevel spondylosis.          CT HEAD WO CONTRAST   Final Result   No acute intracranial abnormality. Stable pattern of atrophy and microvascular ischemic disease in the cerebral   white matter. XR TIBIA FIBULA LEFT (2 VIEWS)   Final Result   No acute osseous injury or osteomyelitis. XR CHEST PORTABLE   Final Result   Interstitial and alveolar opacities in the mid and lower lungs, equivocal for   pulmonary edema or pneumonia. Fluoroscopy modified barium swallow with video    (Results Pending)   CT CHEST WO CONTRAST    (Results Pending)           Assessment/Plan:    Active Hospital Problems    Diagnosis     HTN (hypertension) [I10]      Priority: High    CAD (coronary artery disease) [I25.10]      Priority: High    Atrial fibrillation (HCC) [I48.91]      Priority: High    Hyperlipidemia [E78.5]      Priority: High    Acute respiratory failure (HCC) [J96.00]     Carotid artery disease (HCC) [I77.9]     Type 2 diabetes mellitus without complication, without long-term current use of insulin ([facility] [address] [E11.9]      1. Check MBS  2. PT/OT/SLP eval  3. Cont Cefepime for aspiration PNA and LLE cellulitis  4. Cont Wound care  5. Wean off O2 as tolerated  6. Coumadin goal INR 2-3 for Afib  7. DNR-CCA    DVT Prophylaxis: Coumadin  Diet: ADULT DIET; Regular  Code Status: DNR-CCA    PT/OT Eval Status: Requested    Dispo - home with home PT/OT/VNS/SLP/HHA/SW    Discussed with patient, nursing and CM. He is independent and does not want SNF. Best we work to wean off O2 as tolerated. Suspect aspiration started entire process.     Adelia Cao MD

## 2021-08-17 NOTE — PROGRESS NOTES
Attempted to administer eye drops to this patient. He declines eye drops at this time stating \"it is too early for his eye drops\". This RN tried to explain the eye drops have a 60 minute administration window. Pt still declined.

## 2021-08-18 ENCOUNTER — APPOINTMENT (OUTPATIENT)
Dept: MRI IMAGING | Age: 86
DRG: 177 | End: 2021-08-18
Payer: MEDICARE

## 2021-08-18 LAB
ANION GAP SERPL CALCULATED.3IONS-SCNC: 8 MMOL/L (ref 3–16)
BLOOD CULTURE, ROUTINE: NORMAL
BUN BLDV-MCNC: 21 MG/DL (ref 7–20)
CALCIUM SERPL-MCNC: 9.2 MG/DL (ref 8.3–10.6)
CHLORIDE BLD-SCNC: 103 MMOL/L (ref 99–110)
CO2: 25 MMOL/L (ref 21–32)
CREAT SERPL-MCNC: 0.7 MG/DL (ref 0.8–1.3)
CULTURE, BLOOD 2: NORMAL
GFR AFRICAN AMERICAN: >60
GFR NON-AFRICAN AMERICAN: >60
GLUCOSE BLD-MCNC: 158 MG/DL (ref 70–99)
GLUCOSE BLD-MCNC: 160 MG/DL (ref 70–99)
GLUCOSE BLD-MCNC: 162 MG/DL (ref 70–99)
GLUCOSE BLD-MCNC: 191 MG/DL (ref 70–99)
GLUCOSE BLD-MCNC: 234 MG/DL (ref 70–99)
GLUCOSE BLD-MCNC: 279 MG/DL (ref 70–99)
HCT VFR BLD CALC: 36.1 % (ref 40.5–52.5)
HEMOGLOBIN: 12.4 G/DL (ref 13.5–17.5)
INR BLD: 1.52 (ref 0.88–1.12)
MCH RBC QN AUTO: 32.5 PG (ref 26–34)
MCHC RBC AUTO-ENTMCNC: 34.4 G/DL (ref 31–36)
MCV RBC AUTO: 94.4 FL (ref 80–100)
PDW BLD-RTO: 14.3 % (ref 12.4–15.4)
PERFORMED ON: ABNORMAL
PLATELET # BLD: 169 K/UL (ref 135–450)
PMV BLD AUTO: 7.6 FL (ref 5–10.5)
POTASSIUM SERPL-SCNC: 4.9 MMOL/L (ref 3.5–5.1)
PROTHROMBIN TIME: 17.5 SEC (ref 9.9–12.7)
RBC # BLD: 3.82 M/UL (ref 4.2–5.9)
SODIUM BLD-SCNC: 136 MMOL/L (ref 136–145)
WBC # BLD: 6.6 K/UL (ref 4–11)

## 2021-08-18 PROCEDURE — 2700000000 HC OXYGEN THERAPY PER DAY

## 2021-08-18 PROCEDURE — 85610 PROTHROMBIN TIME: CPT

## 2021-08-18 PROCEDURE — 36415 COLL VENOUS BLD VENIPUNCTURE: CPT

## 2021-08-18 PROCEDURE — 70551 MRI BRAIN STEM W/O DYE: CPT

## 2021-08-18 PROCEDURE — 2060000000 HC ICU INTERMEDIATE R&B

## 2021-08-18 PROCEDURE — 94761 N-INVAS EAR/PLS OXIMETRY MLT: CPT

## 2021-08-18 PROCEDURE — 6370000000 HC RX 637 (ALT 250 FOR IP): Performed by: INTERNAL MEDICINE

## 2021-08-18 PROCEDURE — 2580000003 HC RX 258: Performed by: HOSPITALIST

## 2021-08-18 PROCEDURE — 6370000000 HC RX 637 (ALT 250 FOR IP): Performed by: HOSPITALIST

## 2021-08-18 PROCEDURE — 80048 BASIC METABOLIC PNL TOTAL CA: CPT

## 2021-08-18 PROCEDURE — 6360000002 HC RX W HCPCS: Performed by: NURSE PRACTITIONER

## 2021-08-18 PROCEDURE — 97535 SELF CARE MNGMENT TRAINING: CPT

## 2021-08-18 PROCEDURE — 6360000002 HC RX W HCPCS: Performed by: HOSPITALIST

## 2021-08-18 PROCEDURE — 85027 COMPLETE CBC AUTOMATED: CPT

## 2021-08-18 RX ORDER — QUETIAPINE FUMARATE 25 MG/1
12.5 TABLET, FILM COATED ORAL NIGHTLY
Status: DISCONTINUED | OUTPATIENT
Start: 2021-08-18 | End: 2021-08-19 | Stop reason: HOSPADM

## 2021-08-18 RX ORDER — LORAZEPAM 2 MG/ML
1 INJECTION INTRAMUSCULAR ONCE
Status: COMPLETED | OUTPATIENT
Start: 2021-08-18 | End: 2021-08-18

## 2021-08-18 RX ADMIN — PREDNISOLONE ACETATE 1 DROP: 10 SUSPENSION/ DROPS OPHTHALMIC at 15:00

## 2021-08-18 RX ADMIN — BRIMONIDINE TARTRATE, TIMOLOL MALEATE 1 DROP: 2; 5 SOLUTION/ DROPS OPHTHALMIC at 06:10

## 2021-08-18 RX ADMIN — BRIMONIDINE TARTRATE, TIMOLOL MALEATE 1 DROP: 2; 5 SOLUTION/ DROPS OPHTHALMIC at 19:38

## 2021-08-18 RX ADMIN — DORZOLAMIDE HCL 1 DROP: 20 SOLUTION/ DROPS OPHTHALMIC at 19:38

## 2021-08-18 RX ADMIN — PREDNISOLONE ACETATE 1 DROP: 10 SUSPENSION/ DROPS OPHTHALMIC at 06:10

## 2021-08-18 RX ADMIN — ATORVASTATIN CALCIUM 10 MG: 10 TABLET, FILM COATED ORAL at 21:14

## 2021-08-18 RX ADMIN — CEFEPIME HYDROCHLORIDE 2000 MG: 2 INJECTION, POWDER, FOR SOLUTION INTRAVENOUS at 13:31

## 2021-08-18 RX ADMIN — DORZOLAMIDE HCL 1 DROP: 20 SOLUTION/ DROPS OPHTHALMIC at 15:00

## 2021-08-18 RX ADMIN — LORAZEPAM 1 MG: 2 INJECTION, SOLUTION INTRAMUSCULAR; INTRAVENOUS at 02:14

## 2021-08-18 RX ADMIN — Medication 10 ML: at 21:15

## 2021-08-18 RX ADMIN — Medication 10 ML: at 08:29

## 2021-08-18 RX ADMIN — CEFEPIME HYDROCHLORIDE 2000 MG: 2 INJECTION, POWDER, FOR SOLUTION INTRAVENOUS at 00:48

## 2021-08-18 RX ADMIN — INSULIN LISPRO 1 UNITS: 100 INJECTION, SOLUTION INTRAVENOUS; SUBCUTANEOUS at 21:15

## 2021-08-18 RX ADMIN — INSULIN LISPRO 3 UNITS: 100 INJECTION, SOLUTION INTRAVENOUS; SUBCUTANEOUS at 17:12

## 2021-08-18 RX ADMIN — METOPROLOL SUCCINATE 100 MG: 50 TABLET, EXTENDED RELEASE ORAL at 08:28

## 2021-08-18 RX ADMIN — QUETIAPINE FUMARATE 12.5 MG: 25 TABLET ORAL at 21:15

## 2021-08-18 RX ADMIN — WARFARIN SODIUM 5 MG: 5 TABLET ORAL at 17:14

## 2021-08-18 RX ADMIN — DILTIAZEM HYDROCHLORIDE 120 MG: 120 CAPSULE, EXTENDED RELEASE ORAL at 08:29

## 2021-08-18 RX ADMIN — TAMSULOSIN HYDROCHLORIDE 0.4 MG: 0.4 CAPSULE ORAL at 08:29

## 2021-08-18 RX ADMIN — INSULIN LISPRO 1 UNITS: 100 INJECTION, SOLUTION INTRAVENOUS; SUBCUTANEOUS at 11:56

## 2021-08-18 RX ADMIN — INSULIN LISPRO 1 UNITS: 100 INJECTION, SOLUTION INTRAVENOUS; SUBCUTANEOUS at 08:29

## 2021-08-18 RX ADMIN — DORZOLAMIDE HCL 1 DROP: 20 SOLUTION/ DROPS OPHTHALMIC at 06:10

## 2021-08-18 RX ADMIN — PREDNISOLONE ACETATE 1 DROP: 10 SUSPENSION/ DROPS OPHTHALMIC at 19:38

## 2021-08-18 ASSESSMENT — PAIN SCALES - GENERAL
PAINLEVEL_OUTOF10: 0

## 2021-08-18 NOTE — PROGRESS NOTES
Pharmacy to Dose Warfarin    Pharmacy consulted to dose warfarin for Afib. INR Goal: 2-3    INR today: 1.53    Assessment/Plan:  - INR subtherapeutic today after holding warfarin  - Will continue with patient's home dose tonight of 5 mg   - Daily INR ordered    Pharmacy will continue to follow.     Rachel Gunter, PharmD, BCPS  Clinical Pharmacist  [id number]

## 2021-08-18 NOTE — ACP (ADVANCE CARE PLANNING)
Advanced Care Planning Note. Purpose of Encounter: Advanced care planning in light of CAD  Parties In Attendance: Patient, son on phone  Decisional Capacity: Yes  Subjective: Patient is coughing  Objective: Cr 0.7  Goals of Care Determination: Patient wants limited support (NO CPR, no vent, no HD, no trach, no PEG, no surgery, NO SNF)  Plan:  IV Abx. Cardio consult. PT/OT/SLP eval.  MRI Brain. CT Chest.  Palliative care consult  Code Status: DNR CCA   Time spent on Advanced care Plannin minutes  Advanced Care Planning Documents: Completed advanced directives on chart, children share the POA.     Martina Olsen MD  2021 8:36 AM

## 2021-08-18 NOTE — PROGRESS NOTES
Nurse per patient unable to urinate. Called back spoke to nurse in charge of the patient to do the bladder scan. And if retaining to place a Monae catheter. Was told by nurse that she would not take verbal order so I asked my partner to place the order stat which was placed right away asked nurse to please all the orders I will pending to avoid delay in care due to patient being in the ER for more than 2 hours. Discussed with son over the phone the plan of care .   I discussed with him over the A. fib and said the heart rate is not controlled and will had to increase output him on Cardizem drip he was agreeable to it at the time

## 2021-08-18 NOTE — CONSULTS
Palliative Care:         80 y.o. male who presented with fell at home unable to get up. Patient said PERRL, conjunctiva normal having generalized fatigue weakness. And fever. No sick contacts. Patient was able to get up after the fall. On warfarin. Denies hitting head. Patient having shortness of breath cough. Nonproductive. Patient on arrival to the ER was found to have tachycardic hypoxic with fever needing 3 days nasal cannula. Nothing that makes it better or worse. Patient admitted 8/14/21 for symptom management. Palliative consult placed 8/17/21. Past Medical History:   has a past medical history of Acute MI ([facility] [address] Arthritis, Atrial fibrillation ([facility] [address] CAD (coronary artery disease), Cancer of colon ([facility] [address] Glaucoma, Gout, unspecified, Hypertension, Hypertrophy of prostate without urinary obstruction and other lower urinary tract symptoms (LUTS), Other and unspecified hyperlipidemia, and Type II or unspecified type diabetes mellitus without mention of complication, uncontrolled. Past Surgical History:   has a past surgical history that includes Colon surgery; Cataract removal; Coronary angioplasty with stent; other surgical history; Revision total knee arthroplasty (Right, 08/17/2011); Abdomen surgery (1992); Colonoscopy; Carotid endarterectomy (Left, 2/24/13); Cystocopy (3-27-14); joint replacement (2010); Eye surgery (Left, 03/02/2018); Eye surgery (Left, 04/13/2018); and eye surgery (Left, 2009). Advance Directives:  DNR-CCA. Son Dorlene Pallas. Problem Severity: Pain/Other Symptoms:  Confusion/agitation during HS hours, fall risk AEB fall in home prior to admit/no injury, SOB requiring up to 15L HF 02 on admit. Currently in RA trial with RT and monitoring to determine need for in home use. Present RA 02 sat @ 93% sitting. Bed Mobility/Toileting/Transfer:   Patient uses walker and wears his tennis shoes during ambulation.  Currently with newman on admit due to urinary retention (1800 ml). Is on Flomax QD. Performance Status:        Palliative Performance Scale:  100% []Full Normal activity & work No evidence of disease  90%   [] Full Normal activity & work Some evidence of disease  80%   [] Full Normal activity with Effort Some evidence of disease  70%   [] Reduced Unable Normal Job/Work Significant disease Full Normal or reduced  60%   [x] Ambulation reduced; Significant disease; Can't do hobbies/housework; intake normal   or reduced; occasional assist; LOC full/confusion  50%   [] Mainly sit/lie; Extensive disease; Can't do any work; Considerable assist; intake normal  Or reduced; LOC full/confusion  40%   [] Mainly in bed; Extensive disease; Mainly assist; intake normal or reduced; occasional assist; LOC full/confusion  30%   [] Bed Bound; Extensive disease; Total care; intake reduced; LOC full/confusion  20%   [] Bed Bound; Extensive disease; Total care; intake minimal; Drowsy/coma  10%   [] Bed Bound; Extensive disease; Total care; Mouth care only; Drowsy/coma    PPS 60%    Symptom Assessment: Appetite/Nausea/Bowels/Fatigue:     lbs. Albumin level  4.0. Good appetite. Barium swallow completed 8/17/21 noting regular diet/thin liquids approved. Social History:   reports that he quit smoking about 45 years ago. He has a 30.00 pack-year smoking history. He has never used smokeless tobacco. He reports current alcohol use of about 10.0 standard drinks of alcohol per week. He reports that he does not use drugs. Family History:  family history includes Heart Disease in his mother; High Blood Pressure in his father and mother; Stroke in his mother. Psychological/Spiritual:  . Three supportive children. Nondenominational. Patient resides in own home independently. Has call button for emergencies as he used this when fall occurred appropriately. Family Discussion:       Patient sitting up in chair. Pleasant and cooperative. Able to converse with mild intermittent confusion. Able to refocus appropriately with cues from son during discussions of events prior to admit and current physical status. Denies any pain. Family have had physician discussion yesterday regarding code status. DNR-CCA. ACP in place. Discussion regarding Faith. Strong spiritual support and  has come in for communion. Patient pending MRI of brain today. PT/OT assessments good for DC home with skilled therapies. Patient has skin tear to LLL and is being monitored via wound care nurse. Family has called a personal contact with [facility] [employer] ([place]) for home therapy options when approved for DC home. Family in hopes patient will qualify for PT/OT/Nurse/Aid assistance. Will provide this information to [address] today. Discussed Palliative Care to follow in home upon DC to provide an additional layer/resource. Flyer provided to family along with Palliative Care agencies in community. Family agree for palliative. Will research what agency to select and give Palliative a call to submit when DC home. Follow up discussion with Dr. Franklin Swartz of our meeting today on above information shared and concern for urinary retention. Flomax will increase to BID, will complete a void trail in AM. If unsuccessful patient will go home with newman and follow up with his Urologist in one week. Results pending. Nurse [facility] aware of these discussions. Palliative will continue for ongoing support at needed.

## 2021-08-18 NOTE — PROGRESS NOTES
Occupational Therapy   Occupational Therapy Treatment  Date: 2021   Patient Name: Daryl Barillas  MRN: [id number]     : 1930    Date of Service: 2021    Discharge Recommendations: Daryl Barillas scored a 18/24 on the AM-PAC ADL Inpatient form. Current research shows that an AM-PAC score of 18 or greater is typically associated with a discharge to the patient's home setting. Based on the patient's AM-PAC score, and their current ADL deficits, it is recommended that the patient have 2-3 sessions per week of Occupational Therapy at d/c to increase the patient's independence. At this time, this patient demonstrates the endurance and safety to discharge home with 24 hour home health services and a follow up treatment frequency of 2-3x/wk. HOME HEALTH CARE: LEVEL 1 STANDARD  24 hour supervision/assist.  Discussed this recommendation with son. - Initial home health evaluation to occur within 24-48 hours, in patient home   - Therapy to evaluate with goal of regaining prior level of functioning   - Therapy to evaluate if patient has [id number] [facility] needs for personal care    Please see assessment section for further patient specific details. If patient discharges prior to next session this note will serve as a discharge summary. Please see below for the latest assessment towards goals. OT Equipment Recommendations  Equipment Needed: Yes  Mobility Devices: ADL Assistive Devices  ADL Assistive Devices: Shower Chair with back; Toileting - Raised Toilet Seat with arms    Assessment   Performance deficits / Impairments: Decreased functional mobility ; Decreased ADL status; Decreased high-level IADLs;Decreased strength  Assessment: As stated above, deficits have impacted pt's occupational performance and is not at baseline level of functioning and would benefit from ongoing skilled OT services in order to return to OF.  Pt has improved with ADL tasks but continues to demonstrate some difficulty with functional activity  Treatment Diagnosis: Decreased functional mobility, ADL/IADL tasks and strength due to respiratory failure  Prognosis: Good  History: pt lives alone with family support, independent with ADLs. stands to shower. MOWs and cleaning services at baseline  OT Education: OT Role;Plan of Care  Patient Education: Educated pt on OT role, POC. Continued education for reinforcement. Family present during session and are supportive. Barriers to Learning: visual, cognition  REQUIRES OT FOLLOW UP: Yes  Activity Tolerance  Activity Tolerance: Patient Tolerated treatment well  Activity Tolerance: At end of session, pt on RA 95% SPO2  Safety Devices  Safety Devices in place: Yes  Type of devices: All fall risk precautions in place;Nurse notified; Left in chair;Chair alarm in place;Call light within reach; Patient at risk for falls;Gait belt (high fall risk, video monitor in room)           Patient Diagnosis(es): The primary encounter diagnosis was Acute respiratory failure with hypoxia ([facility] [address] Diagnoses of Supratherapeutic INR, Pneumonia due to infectious organism, unspecified laterality, unspecified part of lung, and General weakness were also pertinent to this visit. has a past medical history of Acute MI ([address] Arthritis, Atrial fibrillation ([address] CAD (coronary artery disease), Cancer of colon ([address] Glaucoma, Gout, unspecified, Hypertension, Hypertrophy of prostate without urinary obstruction and other lower urinary tract symptoms (LUTS), Other and unspecified hyperlipidemia, and Type II or unspecified type diabetes mellitus without mention of complication, uncontrolled. has a past surgical history that includes Colon surgery; Cataract removal; Coronary angioplasty with stent; other surgical history; Revision total knee arthroplasty (Right, 08/17/2011); Abdomen surgery (1992); Colonoscopy; Carotid endarterectomy (Left, 2/24/13); Cystocopy (3-27-14); joint replacement (2010);  Eye surgery (Left, 03/02/2018); Eye surgery (Left, 04/13/2018); and eye surgery (Left, 2009). Treatment Diagnosis: Decreased functional mobility, ADL/IADL tasks and strength due to respiratory failure      Restrictions  Restrictions/Precautions  Restrictions/Precautions: Fall Risk (high fall risk)  Position Activity Restriction  Other position/activity restrictions: Peggy Davis is a 80 y.o. male with past medical history of previous MI, CAD, atrial fibrillation on warfarin, hypertension, diabetes and hyperlipidemia who presents to the ED with complaint of weakness and fatigue. Patient arrived from home by EMS with complaints of weakness and fatigue. Fell this morning. Unable to get up and children wanted patient sent to the emergency department for further evaluation and treatment. Patient denies any injury from the fall this morning. Subjective   General  Chart Reviewed: Yes  Patient assessed for rehabilitation services?: Yes  Additional Pertinent Hx: afib  Family / Caregiver Present: Yes (2 daughters and son)  Diagnosis: acute respiratory failure  Subjective  Subjective: Pt supine upon arrival agreeable to therapy denies pain.  RN put pt on 2L O2 during session, RA at end of session 95% SPO2  Patient Currently in Pain: Denies  Vital Signs  Patient Currently in Pain: Denies       Objective        Orientation  Overall Orientation Status: Within Functional Limits     Balance  Sitting Balance: Supervision  Standing Balance: Minimal assistance (varied between CGA to Meagan)  Standing Balance  Time: ~5-7 min  Activity: ambulate ~15ft total, ADL tasks, sit<>stand rick  Comment: no LOB, but unsteady at times with dynamic tasks  Functional Mobility  Functional - Mobility Device: Rolling Walker  Activity: To/from bathroom  Assist Level: Contact guard assistance  Functional Mobility Comments: pt unsafe with RW, cues were needed for RW management SPO2 taken at end of session 95%  Toilet Transfers  Toilet - Technique: Ambulating  Equipment Used: Standard toilet (pt used R side grab bars to assist with transfer)  Toilet Transfer: Contact guard assistance  ADL  UE Bathing: Supervision (with moist towlette, seated on toilet)  LE Bathing: Supervision (with moist towlette, seated on toilet)  UE Dressing: Supervision (gown change, seated on toilet)  LE Dressing: Supervision (don/doff socks and shoes)  Additional Comments: pt required increased time to complete ADLs        Bed mobility  Supine to Sit: Stand by assistance  Scooting: Stand by assistance  Comment: HOB elevated, increased time required to complete  Transfers  Sit to stand: Contact guard assistance  Stand to sit: Contact guard assistance  Transfer Comments: cues for hand placement with RW     Cognition  Overall Cognitive Status: Exceptions  Arousal/Alertness: Delayed responses to stimuli  Following Commands: Follows one step commands with increased time; Follows one step commands with repetition  Attention Span: Attends with cues to redirect  Safety Judgement: Decreased awareness of need for assistance;Decreased awareness of need for safety  Problem Solving: Assistance required to generate solutions  Insights: Decreased awareness of deficits  Initiation: Requires cues for some  Sequencing: Requires cues for some                                        Plan   Plan  Times per week: 3-5  Times per day: Daily  Current Treatment Recommendations: Strengthening, Functional Mobility Training, Safety Education & Training, Self-Care / ADL, Patient/Caregiver Education & Training, Equipment Evaluation, Education, & procurement    AM-PAC Score        [facility] Inpatient Daily Activity Raw Score: 18 (08/18/21 1433)  AM-PAC Inpatient ADL T-Scale Score : 38.66 (08/18/21 1433)  ADL Inpatient CMS 0-100% Score: 46.65 (08/18/21 1433)  ADL Inpatient CMS G-Code Modifier : CK (08/18/21 1433)    Goals  Short term goals  Time Frame for Short term goals: discharge  Short term goal 1: bed mobility independent---- ongoing 8/18 supervision  Short term goal 2: functional ADL transfer mod I---- ongoing 8/18 CGA  Short term goal 3: functional mobility with RW mod I-----ongoing 8/18 CGA  Short term goal 4: UB/LB ADLs mod I--- ongoing 8/18 supervision  Short term goal 5: tolieting mod I----ongoing 8/18 CGA  Patient Goals   Patient goals : go home       Therapy Time   Individual Concurrent Group Co-treatment   Time In 2214         Time Out 1426         Minutes 49             Total Treatment Minutes:  [facility], [address], [place]   Therapist directly observed and directed this treatment.   Ritu Hare, OTR/L [id number]

## 2021-08-18 NOTE — PLAN OF CARE
Problem: Falls - Risk of:  Goal: Will remain free from falls  Description: Will remain free from falls  Outcome: Ongoing  Goal: Absence of physical injury  Description: Absence of physical injury  Outcome: Ongoing     Problem: Skin Integrity:  Goal: Will show no infection signs and symptoms  Description: Will show no infection signs and symptoms  Outcome: Ongoing  Goal: Absence of new skin breakdown  Description: Absence of new skin breakdown  Outcome: Ongoing     Problem: Serum Glucose Level - Abnormal:  Goal: Ability to maintain appropriate glucose levels has stabilized  Description: Ability to maintain appropriate glucose levels has stabilized  Outcome: Ongoing     Problem: Musculor/Skeletal Functional Status  Goal: Highest potential functional level  Outcome: Ongoing  Goal: Absence of falls  Outcome: Ongoing

## 2021-08-18 NOTE — PROGRESS NOTES
Pt has become paranoid stating that all of the medical staff here is trying to hurt him and that the medicine we are giving him is all poison. He states \"The doctors are keeping me here for no reason, pumping me full of a bunch of shit that doesn't matter\". Once dose ativan administered for agitation. Call light in reach. Bed alarm engaged. Will continue to monitor.

## 2021-08-18 NOTE — PLAN OF CARE
Shift assessment complete. VSS. Pt is alert and oriented x3. He is disoriented to situation. Evening medications administered per MAR. No needs expressed. No signs of distress. Pt denies pain and shortness of breath. Call light within reach. Bed alarm engaged. Video monitoring in place for patient safety. Pt repositioned in bed for comfort. Bed pad changed. Foam mepilex applied to sacrum. Pt encouraged to call if needs arise.    Problem: Falls - Risk of:  Goal: Will remain free from falls  Description: Will remain free from falls  8/17/2021 2151 by Jose Calloway RN  Outcome: Ongoing  8/17/2021 0953 by Fede Dupree RN  Outcome: Ongoing  Goal: Absence of physical injury  Description: Absence of physical injury  8/17/2021 2151 by Jose Calloway RN  Outcome: Ongoing  8/17/2021 0953 by Fede Dupree RN  Outcome: Ongoing     Problem: Skin Integrity:  Goal: Will show no infection signs and symptoms  Description: Will show no infection signs and symptoms  8/17/2021 2151 by Jose Calloway RN  Outcome: Ongoing  8/17/2021 0953 by Fede Dupree RN  Outcome: Ongoing  Goal: Absence of new skin breakdown  Description: Absence of new skin breakdown  8/17/2021 2151 by Jose Calloway RN  Outcome: Ongoing  8/17/2021 0953 by Fede Dupree RN  Outcome: Ongoing     Problem: Serum Glucose Level - Abnormal:  Goal: Ability to maintain appropriate glucose levels has stabilized  Description: Ability to maintain appropriate glucose levels has stabilized  8/17/2021 2151 by Jose Calloway RN  Outcome: Ongoing  8/17/2021 0953 by Fede Dupree RN  Outcome: Ongoing     Problem: Musculor/Skeletal Functional Status  Goal: Highest potential functional level  Outcome: Ongoing  Goal: Absence of falls  Outcome: Ongoing

## 2021-08-18 NOTE — PROGRESS NOTES
Patient and family received communion. Patient requested Sacrament of [address]; Garrett Travis contacted and will see patient 8/19/21.

## 2021-08-18 NOTE — PROGRESS NOTES
Pt attempted to get out of bed on his own. Pt is still disoriented x4, being derogatory toward staff. Pt repositioned in bed. Call light in reach. Bed alarm engaged. Video monitoring for pt safety.

## 2021-08-18 NOTE — PROGRESS NOTES
Hospitalist Progress Note      PCP: Jeyson Herrera MD    Date of Admission: 8/14/2021    Chief Complaint: SOB    Hospital Course: 81 yo M with history of CAD, Afib on Coumadin, DM2, BPH who came to ER with cough and SOB. Admitted as inpatient for aspiration PNA, acute respiratory failure with hypoxia and Afib with RVR. Noted to have drainage from legs with L leg cellulitis and aspiration PNA. Started on IV Cefepime. Followed by Cardiology. COVID negative. Went into NSVT for 90 seconds on 8/17/21. Ordered MRI Brain to evaluate sundowning. 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 Noted to have pancreatic lesion on CT Chest, offered MRI Abdomen and patient refused. Subjective: Patient remains on 2 L NC. No CP, HA or abdominal pain. Coughing. No SOB. Sundowned again overnight. Medications:  Reviewed    Infusion Medications    dilTIAZem Stopped (08/15/21 2045)    dextrose       Scheduled Medications    metoprolol succinate  100 mg Oral Daily    warfarin  5 mg Oral Daily    ipratropium-albuterol  1 ampule Inhalation Q4H WA    bimatoprost  1 drop Right Eye Nightly    brimonidine-timolol  1 drop Right Eye Q12H    dorzolamide  1 drop Right Eye TID    prednisoLONE acetate  1 drop Left Eye TID    dilTIAZem  120 mg Oral Daily    atorvastatin  10 mg Oral Nightly    tamsulosin  0.4 mg Oral Daily    cefepime  2,000 mg Intravenous Q12H    sodium chloride flush  5-40 mL Intravenous 2 times per day    insulin lispro  0-6 Units Subcutaneous TID WC    insulin lispro  0-3 Units Subcutaneous Nightly     PRN Meds: sodium chloride flush, polyethylene glycol, acetaminophen **OR** acetaminophen, perflutren lipid microspheres, glucose, dextrose, glucagon (rDNA), dextrose      Intake/Output Summary (Last 24 hours) at 8/18/2021 0640  Last data filed at 8/18/2021 0612  Gross per 24 hour   Intake 736.7 ml   Output 4725 ml   Net -3988. 3 08/14/2021    SPECGRAV 1.022 08/14/2021    GLUCOSEU >=1000 08/14/2021    GLUCOSEU NEGATIVE 08/02/2011       Radiology:  CT CHEST WO CONTRAST   Final Result   Chronic appearing changes of the lungs with interstitial thickening and   scattered areas of fibrosis. There are also calcified pleural plaques. No definite acute cardiopulmonary process. Mild cardiomegaly. Few prominent mediastinal lymph nodes that are likely reactive. Cholelithiasis. Cystic focus within the pancreatic body that may represent a benign process   although MRI of the abdomen with pancreatic protocol may be helpful in   further characterization if deemed clinically necessary. CT CERVICAL SPINE WO CONTRAST   Final Result   No acute fracture or subluxation. C4-5 mild degenerative anterolisthesis. Overall moderate multilevel spondylosis. CT HEAD WO CONTRAST   Final Result   No acute intracranial abnormality. Stable pattern of atrophy and microvascular ischemic disease in the cerebral   white matter. XR TIBIA FIBULA LEFT (2 VIEWS)   Final Result   No acute osseous injury or osteomyelitis. XR CHEST PORTABLE   Final Result   Interstitial and alveolar opacities in the mid and lower lungs, equivocal for   pulmonary edema or pneumonia. Fluoroscopy modified barium swallow with video    (Results Pending)   MRI BRAIN WO CONTRAST    (Results Pending)           Assessment/Plan:    Active Hospital Problems    Diagnosis     HTN (hypertension) [I10]      Priority: High    CAD (coronary artery disease) [I25.10]      Priority: High    Atrial fibrillation (HCC) [I48.91]      Priority: High    Hyperlipidemia [E78.5]      Priority: High    Acute respiratory failure (HCC) [J96.00]     Carotid artery disease (HCC) [I77.9]     Type 2 diabetes mellitus without complication, without long-term current use of insulin ([address] [E11.9]      1. Check MRI Brain to r/o stroke  2.  PT/OT/SLP eval recommend home PT/OT/VNS/HHA/SW/SLP  3. Cont Cefepime for aspiration PNA and LLE cellulitis  4. Cont Wound care  5. Wean off O2 as tolerated  6. Coumadin goal INR 2-3 for Afib  7. DNR-CCA  8. Palliative care consult (I think palliative care branch of [facility] is best)  9. Start Seroquel 12.5 mg qhs  10. Offered MRI Abdomen for pancreatic lesion, patient refused  11. Increase Flomax 0.8 mg daily  12. VT in AM; replace Monae if PVR > 300 cc    DVT Prophylaxis: Coumadin  Diet: ADULT DIET; Regular  Code Status: DNR-CCA    PT/OT Eval Status: Following    Dispo - home with home PT/OT/VNS/SLP/HHA/SW    Discussed with patient, Dr Merissa Glover (Cardio), nursing and CM. Discussed extensively with son    Suspect component of vascular dementia is leading to sundowning. I am concerned he will do poorly with home O2. Will work to wean off O2. Void trial in AM.    Hopefully home tomorrow.     Yashira Stuart MD

## 2021-08-19 ENCOUNTER — TELEPHONE (OUTPATIENT)
Dept: PHARMACY | Age: 86
End: 2021-08-19

## 2021-08-19 VITALS
TEMPERATURE: 97.6 F | SYSTOLIC BLOOD PRESSURE: 136 MMHG | RESPIRATION RATE: 20 BRPM | HEART RATE: 88 BPM | HEIGHT: 71 IN | BODY MASS INDEX: 30.73 KG/M2 | WEIGHT: 219.5 LBS | OXYGEN SATURATION: 95 % | DIASTOLIC BLOOD PRESSURE: 71 MMHG

## 2021-08-19 PROBLEM — J96.01 ACUTE RESPIRATORY FAILURE WITH HYPOXIA (HCC): Status: ACTIVE | Noted: 2021-08-14

## 2021-08-19 PROBLEM — F01.50 VASCULAR DEMENTIA (HCC): Status: ACTIVE | Noted: 2021-08-19

## 2021-08-19 PROBLEM — J84.10 PULMONARY FIBROSIS (HCC): Status: ACTIVE | Noted: 2021-08-19

## 2021-08-19 PROBLEM — F05 SUNDOWNING: Status: ACTIVE | Noted: 2021-08-19

## 2021-08-19 PROBLEM — N13.8 BPH WITH URINARY OBSTRUCTION: Status: ACTIVE | Noted: 2021-08-19

## 2021-08-19 PROBLEM — K86.2 PANCREATIC CYST: Status: ACTIVE | Noted: 2021-08-19

## 2021-08-19 PROBLEM — J92.9 PLEURAL PLAQUE: Status: ACTIVE | Noted: 2021-08-19

## 2021-08-19 PROBLEM — N40.1 BPH WITH URINARY OBSTRUCTION: Status: ACTIVE | Noted: 2021-08-19

## 2021-08-19 LAB
ANION GAP SERPL CALCULATED.3IONS-SCNC: 10 MMOL/L (ref 3–16)
BUN BLDV-MCNC: 20 MG/DL (ref 7–20)
CALCIUM SERPL-MCNC: 9.4 MG/DL (ref 8.3–10.6)
CHLORIDE BLD-SCNC: 101 MMOL/L (ref 99–110)
CO2: 25 MMOL/L (ref 21–32)
CREAT SERPL-MCNC: 0.6 MG/DL (ref 0.8–1.3)
GFR AFRICAN AMERICAN: >60
GFR NON-AFRICAN AMERICAN: >60
GLUCOSE BLD-MCNC: 138 MG/DL (ref 70–99)
GLUCOSE BLD-MCNC: 150 MG/DL (ref 70–99)
GLUCOSE BLD-MCNC: 187 MG/DL (ref 70–99)
HCT VFR BLD CALC: 38.5 % (ref 40.5–52.5)
HEMOGLOBIN: 13.1 G/DL (ref 13.5–17.5)
INR BLD: 1.36 (ref 0.88–1.12)
MCH RBC QN AUTO: 31.9 PG (ref 26–34)
MCHC RBC AUTO-ENTMCNC: 33.9 G/DL (ref 31–36)
MCV RBC AUTO: 94.2 FL (ref 80–100)
PDW BLD-RTO: 14.4 % (ref 12.4–15.4)
PERFORMED ON: ABNORMAL
PERFORMED ON: ABNORMAL
PLATELET # BLD: 188 K/UL (ref 135–450)
PMV BLD AUTO: 7.6 FL (ref 5–10.5)
POTASSIUM SERPL-SCNC: 4.1 MMOL/L (ref 3.5–5.1)
PROTHROMBIN TIME: 15.6 SEC (ref 9.9–12.7)
RBC # BLD: 4.08 M/UL (ref 4.2–5.9)
SODIUM BLD-SCNC: 136 MMOL/L (ref 136–145)
WBC # BLD: 6.2 K/UL (ref 4–11)

## 2021-08-19 PROCEDURE — 6370000000 HC RX 637 (ALT 250 FOR IP): Performed by: INTERNAL MEDICINE

## 2021-08-19 PROCEDURE — 80048 BASIC METABOLIC PNL TOTAL CA: CPT

## 2021-08-19 PROCEDURE — 6370000000 HC RX 637 (ALT 250 FOR IP): Performed by: HOSPITALIST

## 2021-08-19 PROCEDURE — 94640 AIRWAY INHALATION TREATMENT: CPT

## 2021-08-19 PROCEDURE — 85027 COMPLETE CBC AUTOMATED: CPT

## 2021-08-19 PROCEDURE — 94761 N-INVAS EAR/PLS OXIMETRY MLT: CPT

## 2021-08-19 PROCEDURE — 85610 PROTHROMBIN TIME: CPT

## 2021-08-19 PROCEDURE — 2580000003 HC RX 258: Performed by: HOSPITALIST

## 2021-08-19 PROCEDURE — 36415 COLL VENOUS BLD VENIPUNCTURE: CPT

## 2021-08-19 RX ORDER — TAMSULOSIN HYDROCHLORIDE 0.4 MG/1
0.8 CAPSULE ORAL DAILY
Qty: 60 CAPSULE | Refills: 0 | Status: SHIPPED | OUTPATIENT
Start: 2021-08-19 | End: 2021-10-18 | Stop reason: SDUPTHER

## 2021-08-19 RX ORDER — METOPROLOL SUCCINATE 100 MG/1
100 TABLET, EXTENDED RELEASE ORAL DAILY
Qty: 30 TABLET | Refills: 0 | Status: SHIPPED | OUTPATIENT
Start: 2021-08-19 | End: 2021-12-07 | Stop reason: SDUPTHER

## 2021-08-19 RX ORDER — CEFDINIR 300 MG/1
300 CAPSULE ORAL 2 TIMES DAILY
Qty: 10 CAPSULE | Refills: 0 | Status: SHIPPED | OUTPATIENT
Start: 2021-08-19 | End: 2021-08-24

## 2021-08-19 RX ORDER — QUETIAPINE FUMARATE 25 MG/1
12.5 TABLET, FILM COATED ORAL NIGHTLY
Qty: 15 TABLET | Refills: 0 | Status: SHIPPED | OUTPATIENT
Start: 2021-08-19 | End: 2021-12-16

## 2021-08-19 RX ORDER — WARFARIN SODIUM 7.5 MG/1
7.5 TABLET ORAL DAILY
Status: DISCONTINUED | OUTPATIENT
Start: 2021-08-19 | End: 2021-08-19 | Stop reason: HOSPADM

## 2021-08-19 RX ADMIN — IPRATROPIUM BROMIDE AND ALBUTEROL SULFATE 1 AMPULE: .5; 3 SOLUTION RESPIRATORY (INHALATION) at 08:47

## 2021-08-19 RX ADMIN — DILTIAZEM HYDROCHLORIDE 120 MG: 120 CAPSULE, EXTENDED RELEASE ORAL at 08:07

## 2021-08-19 RX ADMIN — Medication 10 ML: at 08:09

## 2021-08-19 RX ADMIN — TAMSULOSIN HYDROCHLORIDE 0.4 MG: 0.4 CAPSULE ORAL at 08:08

## 2021-08-19 RX ADMIN — METOPROLOL SUCCINATE 100 MG: 50 TABLET, EXTENDED RELEASE ORAL at 08:07

## 2021-08-19 RX ADMIN — PREDNISOLONE ACETATE 1 DROP: 10 SUSPENSION/ DROPS OPHTHALMIC at 08:08

## 2021-08-19 RX ADMIN — INSULIN LISPRO 1 UNITS: 100 INJECTION, SOLUTION INTRAVENOUS; SUBCUTANEOUS at 08:11

## 2021-08-19 RX ADMIN — DORZOLAMIDE HCL 1 DROP: 20 SOLUTION/ DROPS OPHTHALMIC at 08:08

## 2021-08-19 RX ADMIN — BRIMONIDINE TARTRATE, TIMOLOL MALEATE 1 DROP: 2; 5 SOLUTION/ DROPS OPHTHALMIC at 08:08

## 2021-08-19 ASSESSMENT — PAIN SCALES - GENERAL
PAINLEVEL_OUTOF10: 0
PAINLEVEL_OUTOF10: 0

## 2021-08-19 NOTE — PROGRESS NOTES
3 attempts to obtain a new IV site by this RN. Unsuccessful. This patient has had 3-4 IVs go bad since his admission.

## 2021-08-19 NOTE — PROGRESS NOTES
Pt removed his gown and telemetry device. Pt also kicked all of this blankets off into the floor. Pt is disoriented x4 at this time. Pt IV in his left wrist has also been tugged on and is no longer functioning properly. This RN repositioned the pt in bed and provided him with a clean gown and linens. New foam mepilex placed on sacrum. Monae catheter emptied. This RN attempted to reorient pt and was unsuccessful. VSS. Will obtain new IV when time allows. Call light within reach. Bed alarm engaged. Call light within reach.

## 2021-08-19 NOTE — PLAN OF CARE
Shift assessment complete. VSS. Pt and his son updated on plan of care. Voiding trial to begin in AM according to day shift RN, however there are no orders at this time to remove newman catheter. Provider notified. No signs of distress. No needs expressed. Call light within reach. Bed alarm engaged. Video monitoring for patient safety. Pt encouraged to call for assistance.    Problem: Falls - Risk of:  Goal: Will remain free from falls  Description: Will remain free from falls  8/18/2021 2339 by Ana Skelton RN  Outcome: Ongoing  8/18/2021 1757 by Ela Seaman RN  Outcome: Ongoing  Goal: Absence of physical injury  Description: Absence of physical injury  8/18/2021 2339 by Ana Skelton RN  Outcome: Ongoing  8/18/2021 1757 by Ela Seaman RN  Outcome: Ongoing     Problem: Skin Integrity:  Goal: Will show no infection signs and symptoms  Description: Will show no infection signs and symptoms  8/18/2021 2339 by Ana Skelton RN  Outcome: Ongoing  8/18/2021 1757 by Ela Seaman RN  Outcome: Ongoing  Goal: Absence of new skin breakdown  Description: Absence of new skin breakdown  8/18/2021 2339 by Ana Skelton RN  Outcome: Ongoing  8/18/2021 1757 by Ela Seaman RN  Outcome: Ongoing     Problem: Serum Glucose Level - Abnormal:  Goal: Ability to maintain appropriate glucose levels has stabilized  Description: Ability to maintain appropriate glucose levels has stabilized  8/18/2021 2339 by Ana Skelton RN  Outcome: Ongoing  8/18/2021 1757 by Ela Seaman RN  Outcome: Ongoing     Problem: Musculor/Skeletal Functional Status  Goal: Highest potential functional level  8/18/2021 2339 by Ana Skelton RN  Outcome: Ongoing  8/18/2021 1757 by Ela Seaman RN  Outcome: Ongoing  Goal: Absence of falls  8/18/2021 2339 by Ana Skelton RN  Outcome: Ongoing  8/18/2021 1757 by Ela Seaman RN  Outcome: Ongoing

## 2021-08-19 NOTE — DISCHARGE INSTR - COC
Continuity of Care Form    Patient Name: Jaron Baez   :  1930  MRN:  [id number]    Admit date:  2021  Discharge date:  21    Code Status Order: DNR-CCA   Advance Directives:      Admitting Physician:  Maria Teresa Oliva MD  PCP: Maryjane Peguero MD    Discharging Nurse: Jose Calloway RN BSN  [address] Unit/Room#: 2EI-0167/1048-38  Discharging Unit Phone Number: [phone]    Emergency Contact:   Extended Emergency Contact Information  Primary Emergency Contact: Loly Cazares  Address: [address], [address]  Home Phone: [phone]  Relation: Child  Secondary Emergency Contact: Tessy Hogue  Relation: Child    Past Surgical History:  Past Surgical History:   Procedure Laterality Date    ABDOMEN SURGERY      colon    CAROTID ENDARTERECTOMY Left 13    CATARACT REMOVAL      left    COLON SURGERY      COLONOSCOPY      CORONARY ANGIOPLASTY WITH STENT PLACEMENT      6 stents  05    CYSTOSCOPY  3-27-14    EYE SURGERY Left 2018    glacoma open angle 0S baerveldt implant 0S    EYE SURGERY Left 2018    posterior sclerotomy and aspiartion choroidal fluid     EYE SURGERY Left     drain    JOINT REPLACEMENT  2010    right knee    OTHER SURGICAL HISTORY      post-op reaction to pain med- very combative    REVISION TOTAL KNEE ARTHROPLASTY Right 2011    Tibial liner exchange & synovectomy       Immunization History:   Immunization History   Administered Date(s) Administered    COVID-19, Moderna, PF, 100mcg/0.5mL 2021, 2021    Influenza, Triv, inactivated, subunit, adjuvanted, IM (Fluad 65 yrs and older) 2019    Pneumococcal Conjugate 13-valent (Covvgao99) 2015    Pneumococcal Polysaccharide (Nwhzxbapd33) 2017    Tdap (Boostrix, Adacel) 2021    Zoster Live (Zostavax) 2012       Active Problems:  Patient Active Problem List   Diagnosis Code    Osteoarthritis of knee M17.10    Atrial fibrillation (HCC) I48.91    CAD (coronary artery disease) I25.10    Hyperlipidemia E78.5    HTN (hypertension) I10    Type 2 diabetes mellitus without complication, without long-term current use of insulin (HCC) E11.9    Carotid artery disease (HCC) I77.9    Cataract H26.9    Glaucoma H40.9    Benign prostatic hyperplasia with urinary frequency N40.1, R35.0    Acute respiratory failure (HCC) J96.00       Isolation/Infection:   Isolation            No Isolation          Patient Infection Status       Infection Onset Added Last Indicated Last Indicated By Review Planned Expiration Resolved Resolved By    None active    Resolved    COVID-19 Rule Out 08/14/21 08/14/21 08/16/21 COVID-19 (Ordered)   08/16/21 Rule-Out Test Resulted            Nurse Assessment:  Last Vital Signs: /79   Pulse 76   Temp 97.8 °F (36.6 °C) (Axillary)   Resp 22   Ht 5' 11\" (1.803 m)   Wt 220 lb 12.8 oz (100.2 kg)   SpO2 95%   BMI 30.80 kg/m²     Last documented pain score (0-10 scale): Pain Level: 0  Last Weight:   Wt Readings from Last 1 Encounters:   08/18/21 220 lb 12.8 oz (100.2 kg)     Mental Status:  disoriented and alert    IV Access:  - None    Nursing Mobility/ADLs:  Walking   Assisted  Transfer  Assisted  Bathing  Assisted  Dressing  Assisted  Toileting  Assisted  Feeding  Assisted  Med Admin  Assisted  Med Delivery   whole    Wound Care Documentation and Therapy:  Incision 12/14/10 Knee Right (Active)   Number of days: 3901       Incision 08/17/11 Knee Right (Active)   Number of days: 3654       Incision 02/24/14 Neck Left (Active)   Number of days: 2733       Incision 03/02/18 Eye Left (Active)   Number of days: 1265       Incision 04/13/18 Eye Left (Active)   Number of days: 3479       Wound 08/14/21 Tibial Left skin tear; flap intact (Active)   Wound Etiology Skin Tear 08/19/21 0429   Dressing Status Clean;Dry; Intact 08/19/21 0429   Wound Cleansed Not Cleansed 08/19/21 0429 AM EDT    PHYSICIAN SECTION    Prognosis: Fair    Condition at Discharge: Stable    Rehab Potential (if transferring to Rehab): Fair    Recommended Labs or Other Treatments After Discharge:     Physician Certification: I certify the above information and transfer of Jaron Baez  is necessary for the continuing treatment of the diagnosis listed and that he requires [address] for less 30 days.      Update Admission H&P: No change in H&P    PHYSICIAN SIGNATURE:  Electronically signed by Andrew Lares MD on 8/19/21 at 6:44 AM EDT

## 2021-08-19 NOTE — PROGRESS NOTES
Palliative Care:     Patient discharged today. Son and daughter of patient at bedside to hear discharge plan and agree. Son Kylah Blackwell has information regarding palliative care. Has not provided a definite agency at this time. States he will research further. He has my contact information should he want my assistance in sending a referral. He agrees. No further palliative needs at this time.

## 2021-08-19 NOTE — PROGRESS NOTES
Data- discharge order received, pt verbalized agreement to discharge, needs for [address] [employer] [facility] with [facility]. DON reviewed and signed by MD, to be completed by RN. Action- AVS prepared, discharge instructions prepared and given to son/daughter, medication information packet given r/t NEW or CHANGED prescriptions, pt verbalized understanding further self-review. D/C instruction summary: Diet- regular, Activity- as tolerated, follow up with Primary Care Physician Job Lu, [phone] appointment within week,  medications prescriptions to be filled. Contact information provided to above agencies used. Response- Case Management/ reported faxing completed DON and AVS to needed HHC/DME services stated above. Pt belongings gathered, IV removed, pt dressed. Disposition is home with HHC/DME as stated above, transported with belongings, taken to lobby via w/c with son and daughter, no complications. Tele returned to [place]. Pt left with newman catheter and family made aware of consult/follow up with urology within a week with MARIE. To lobby in wheelchair to car. No complications. Home with HHC. 1. WEIGHT: Admit Weight: 214 lb (97.1 kg) (08/14/21 1017)        Today  Weight: 219 lb 8 oz (99.6 kg) (08/19/21 0747)       2.  O2 SAT.: SpO2: 95 % (08/19/21 0850)

## 2021-08-19 NOTE — PROGRESS NOTES
Telemetry strip reviewed with nursing staff when he had a wide complex tachycardia with exertion. He has known rate dependent left bundle branch block. The episode he had was appropriate increased rate of a. Fib that became a left bundle branch block due to rate.  It was not ventricular tachycardia

## 2021-08-19 NOTE — PROGRESS NOTES
CLINICAL PHARMACY NOTE: MEDS TO BEDS    Total # of Prescriptions Filled: 3   The following medications were delivered to the patient:  · Quetiapine 25 mg  · Cefdinir 300 mg  · Metoprolol  mg    Additional Documentation:  Delivered to Patient son=signed  Stored flomax per patient, already has at home  Francisco Javier Mckenzie CPhT

## 2021-08-19 NOTE — DISCHARGE SUMMARY
Hospital Medicine Discharge Summary    Patient: Heather Kahn     Gender: male  : 1930   Age: 80 y.o. MRN: [id number]    Admitting Physician: Scotty Blanco MD  Discharge Physician: Mariella Adamson MD     Code Status: DNR-CCA     Admit Date: 2021   Discharge Date:   21    Disposition:  Home    Discharge Diagnoses: Active Hospital Problems    Diagnosis Date Noted    HTN (hypertension) [I10] 2013     Priority: High    CAD (coronary artery disease) [I25.10] 2011     Priority: High    Atrial fibrillation ([facility] [I48.91] 2011     Priority: High    Hyperlipidemia [E78.5] 2011     Priority: High    Acute respiratory failure (HCC) [J96.00] 2021    Carotid artery disease (HCC) [I77.9] 2014    Type 2 diabetes mellitus without complication, without long-term current use of insulin ([facility] [address] [E11.9]        Follow-up appointments:  one week    Outpatient to do list: F/U with PCP and Cardio. F/U with [facility] palliative care    Condition at Discharge:  Stable    Hospital Course:   79 yo M with history of CAD, Afib on Coumadin, DM2, BPH who came to ER with cough and SOB. Admitted as inpatient for aspiration PNA, acute respiratory failure with hypoxia and Afib with RVR. Noted to have drainage from legs with L leg cellulitis and aspiration PNA. Started on IV Cefepime. Followed by Cardiology. COVID negative. Required Monae for BPH with obstruction. Increased Flomax 0.8 mg daily. Void trial failed on  and will go home with Monae. Can call [facility] office to make appt with Bryan Robb (Urology NP) who can void trial in office next week and replace Monae if he fails. Went into NSVT for 90 seconds on 21, Toprol XL increased to 100 mg daily.       MRI Brain to evaluate sundowning,  No acute infarct.  Diffuse white matter changes in the periventricular white   matter subcortical white matter with diffuse volume loss.  No evidence for   blood products on gradient imaging     Started on Seroquel 12.5 mg qhs. .  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 Noted to have pancreatic lesion on CT Chest, offered MRI Abdomen and patient refused. Suspect patient came with BPH with obstruction, developed fluid overload and went into Afib with RVR. He improved after Monae and IV Lasix. Son is pharmacist and POA. Met with Palliative care. Agreed to follow with palliative care from [facility]. Arranged for home PT/OT/VNS/HHA/SW and written for shower chair with back and raised toilet seat. I suspect this patient is entering final stages of independence and will require significant support from PCP and home care to assist family. He would be appropriate for hospice care if he never wants to move into long term care/halfway. Will f/u with PCP, Urology and Cardio.     Discharge Medications:   Current Discharge Medication List      START taking these medications    Details   QUEtiapine (SEROQUEL) 25 MG tablet Take 0.5 tablets by mouth nightly  Qty: 15 tablet, Refills: 0      cefdinir (OMNICEF) 300 MG capsule Take 1 capsule by mouth 2 times daily for 5 days  Qty: 10 capsule, Refills: 0           Current Discharge Medication List      CONTINUE these medications which have CHANGED    Details   metoprolol succinate (TOPROL XL) 100 MG extended release tablet Take 1 tablet by mouth daily  Qty: 30 tablet, Refills: 0      tamsulosin (FLOMAX) 0.4 MG capsule Take 2 capsules by mouth daily  Qty: 60 capsule, Refills: 0           Current Discharge Medication List      CONTINUE these medications which have NOT CHANGED    Details   simvastatin (ZOCOR) 20 MG tablet Take 1 tablet by mouth nightly  Qty: 90 tablet, Refills: 1      glimepiride (AMARYL) 1 MG tablet Take 1 tablet daily  Qty: 90 tablet, Refills: 1      dilTIAZem (CARDIZEM CD) 120 MG extended release capsule Take 1 capsule daily  Qty: 90 capsule, Refills: 3      ONETOUCH VERIO strip 1 each by In Vitro route 4 times daily Test 2-4 times a day E11.9  Qty: 300 each, Refills: 3    Comments: Diabetes Mellitus E11.9  Associated Diagnoses: Type 2 diabetes mellitus without complication, without long-term current use of insulin (HCC)      prednisoLONE acetate (PRED FORTE) 1 % ophthalmic suspension Place 1 drop into the left eye 3 times daily       Lancets Thin MISC 2-4 Devices by Does not apply route daily 2-4 leilani daily  Qty: 300 each, Refills: 3    Comments: Diabetes Mellitus E11.65      bimatoprost (LUMIGAN) 0.01 % SOLN ophthalmic drops Place 1 drop into the right eye nightly       dorzolamide (TRUSOPT) 2 % ophthalmic solution Place 1 drop into the right eye 2 times daily       warfarin (COUMADIN) 5 MG tablet Take 7.5 mg (one and a half tablets) coumadin every evening until directed otherwise by Dr. Elizabeth Major: 90 tablet, Refills: 3      brimonidine-timolol (COMBIGAN) 0.2-0.5 % ophthalmic solution Place 1 drop into the right eye every 12 hours            Current Discharge Medication List          Discharge Exam:    /71   Pulse 88   Temp 97.6 °F (36.4 °C) (Oral)   Resp 20   Ht 5' 11\" (1.803 m)   Wt 219 lb 8 oz (99.6 kg)   SpO2 95%   BMI 30.61 kg/m²   General appearance: No apparent distress, appears stated age and cooperative. HEENT: Pupils equal, round, and reactive to light. Conjunctivae/corneas clear. Neck: Supple, with full range of motion. No jugular venous distention. Trachea midline. Respiratory:  Normal respiratory effort. Clear to auscultation, bilaterally without Rales/Wheezes/Rhonchi. Cardiovascular: Regular rate and rhythm with normal S1/S2 without murmurs, rubs or gallops. Abdomen: Soft, non-tender, non-distended with normal bowel sounds. Musculoskeletal: No clubbing, cyanosis or edema bilaterally. Full range of motion without deformity. Skin: Skin color, texture, turgor normal.  No rashes or lesions.   Neurologic:  Neurovascularly intact Adequate visualization Indications:Congestive heart failure. Patient Status: Routine Height: 71 inches Weight: 216 pounds BSA: 2.18 m2 BMI: 30.13 kg/m2 Rhythm: Atrial fibrillation HR: 82 bpm BP: 103/57 mmHg  Conclusions   Summary  Left ventricular cavity size is normal with mild concentric left ventricular  hypertrophy. Overall left ventricular systolic function appears normal.  Ejection fraction is visually estimated to be 55-60%. No regional wall motion abnormalities are noted. Unable to assess diastology due to atrial fibrillation. Trivial mitral regurgitation. Mild aortic valve calcification without any evidence of aortic stenosis. Moderate eccentric aortic regurgitation. Mild-to-moderate tricuspid regurgitation with a PASP of 34 mmHg. The ascending aorta is incompletely visualized but is likely mildly dilated. Consider cross sectional imaging if clinically indicated. Signature   ------------------------------------------------------------------  Electronically signed by Cassi Sparks DO (Interpreting  physician) on 08/16/2021 at 05:13 PM  ------------------------------------------------------------------   Findings   Left Ventricle  Left ventricular cavity size is normal with mild concentric left ventricular  hypertrophy. Overall left ventricular systolic function appears normal.  Ejection fraction is visually estimated to be 55-60%. No regional wall motion abnormalities are noted. Average E/e': 13.4. Cannot determine diastology due to atrial fibrillation. Mitral Valve  The mitral valve normal in structure and function. Trivial mitral regurgitation. Left Atrium  The left atrium is normal in size. Aortic Valve  Mild aortic valve calcification without any evidence of aortic stenosis. Moderate eccentric aortic regurgitation. Pressure half time of 386 ms. Tricuspid aortic valve. Aorta  The aortic root is normal in size.   The ascending aorta is incompletely visualized but is likely mildly dilated. Right Ventricle  The right ventricle is normal in size and function. TAPSE not properly obtained. RV s' velocity: 10.8cm/s. Tricuspid Valve  The tricuspid valve is normal in structure and function. Mild-to-moderate tricuspid regurgitation with a PASP of 34 mmHg. Right Atrium  The right atrium is mildly dilated. Pulmonic Valve  The pulmonic valve is not well visualized. Trivial pulmonic regurgitation. Pericardial Effusion  No pericardial effusion noted. Pleural Effusion  No pleural effusion. Miscellaneous  IVC size is dilated (>2.1 cm) but collapses > 50% with respiration  consistent with elevated RA pressure (8 mmHg). M-Mode/2D Measurements (cm)   LV Diastolic Dimension: 5.43 cm LV Systolic Dimension: 4.68 cm  LV Septum Diastolic: 1.2 cm  LV PW Diastolic: 1.2 cm         AO Root Dimension: 3.5 cm                                  LA Dimension: 4.1 cm                                  LA Area: 26.4 cm2  LVOT: 2.1 cm                    LA volume/Index: 71.47 ml /33 ml/m2  Doppler Measurements   AV Peak Velocity: 203 cm/s     MV Peak E-Wave: 113 cm/s  AV Peak Gradient: 16.48 mmHg  AV Mean Gradient: 10 mmHg  LVOT Peak Velocity: 127 cm/s  AV Area (Continuity):1.96 cm2   TR Velocity:256 cm/s  TR Gradient:26.21 mmHg   E' Septal Velocity: 7.15 cm/s  E' Lateral Velocity: 10.4 cm/s  PV Peak Velocity: 98.5 cm/s  PV Peak Gradient: 3.88 mmHg   Aortic Valve   Peak Velocity: 203 cm/s     Mean Velocity: 153 cm/s  Peak Gradient: 16.48 mmHg   Mean Gradient: 10 mmHg  Area (continuity): 1.96 cm2  AV VTI: 43.2 cm  AI P1/2t: 386 msec  Aorta   Aortic Root: 3.5 cm  Ascending Aorta: 4.3 cm  LVOT Diameter: 2.1 cm      XR TIBIA FIBULA LEFT (2 VIEWS)    Result Date: 8/14/2021  EXAMINATION: 4 XRAY VIEWS OF THE LEFT TIBIA AND FIBULA 8/14/2021 10:35 am COMPARISON: None.  HISTORY: ORDERING SYSTEM PROVIDED HISTORY: Wound, cellulitis, sepsis TECHNOLOGIST PROVIDED HISTORY: Reason for exam:->Wound, cellulitis, sepsis FINDINGS: The alignment of the tibia and fibula is normal.  No acute fracture or dislocation is seen. No osteolytic focus is identified. There is no emphysema or radiopaque foreign body. Moderate osteoarthritis of the knee. Arterial vascular calcifications. No acute osseous injury or osteomyelitis. CT HEAD WO CONTRAST    Result Date: 8/14/2021  EXAMINATION: CT OF THE HEAD WITHOUT CONTRAST  8/14/2021 10:31 am TECHNIQUE: CT of the head was performed without the administration of intravenous contrast. Dose modulation, iterative reconstruction, and/or weight based adjustment of the mA/kV was utilized to reduce the radiation dose to as low as reasonably achievable. COMPARISON: 11/01/2019 HISTORY: ORDERING SYSTEM PROVIDED HISTORY: Anticoagulation on warfarin with weakness/fall, questionable head injury TECHNOLOGIST PROVIDED HISTORY: Has a \"code stroke\" or \"stroke alert\" been called? ->No Reason for exam:->Anticoagulation on warfarin with weakness/fall, questionable head injury Decision Support Exception - unselect if not a suspected or confirmed emergency medical condition->Emergency Medical Condition (MA) Reason for Exam: Anticoagulation on warfarin with weakness/fall, questionable head injury Acuity: Unknown Type of Exam: Unknown FINDINGS: BRAIN/VENTRICLES: There is a moderate amount of generalized atrophy. Scattered low-attenuation is in the periventricular and subcortical white matter, consistent with small vessel disease, stable, moderate in degree. No acute hemorrhage or abnormal extra-axial fluid collection is identified. ORBITS: A semi circular metallic surgical device is noted along the superolateral aspect of the left orbital globe. No acute abnormality of the orbits. SINUSES: The visualized paranasal sinuses and mastoid air cells demonstrate no acute abnormality. SOFT TISSUES/SKULL:  No acute abnormality of the visualized skull or soft tissues. No acute intracranial abnormality.  Stable pattern of atrophy the pancreatic body that may represent a benign process although MRI of the abdomen with pancreatic protocol may be helpful in further characterization if deemed clinically necessary. CT CERVICAL SPINE WO CONTRAST    Result Date: 8/14/2021  EXAMINATION: CT OF THE CERVICAL SPINE WITHOUT CONTRAST 8/14/2021 10:31 am TECHNIQUE: CT of the cervical spine was performed without the administration of intravenous contrast. Multiplanar reformatted images are provided for review. Dose modulation, iterative reconstruction, and/or weight based adjustment of the mA/kV was utilized to reduce the radiation dose to as low as reasonably achievable. COMPARISON: None. HISTORY: ORDERING SYSTEM PROVIDED HISTORY: Sepsis, weakness, fall with questionable head/neck injury. TECHNOLOGIST PROVIDED HISTORY: Reason for exam:->Sepsis, weakness, fall with questionable head/neck injury. Decision Support Exception - unselect if not a suspected or confirmed emergency medical condition->Emergency Medical Condition (MA) Reason for Exam: Anticoagulation on warfarin with weakness/fall, questionable head injury Acuity: Unknown Type of Exam: Unknown FINDINGS: BONES/ALIGNMENT: At C4-5 there is mild anterolisthesis. No fractures are identified. DEGENERATIVE CHANGES: Marginal endplate spurring is noted at multiple levels. At C5-6 and C6-7 the degenerative changes are greater, especially at the latter level. There is moderate left osseous foraminal narrowing at C6-7. There is facet arthropathy at multiple levels. SOFT TISSUES: There is no prevertebral soft tissue swelling. No acute fracture or subluxation. C4-5 mild degenerative anterolisthesis. Overall moderate multilevel spondylosis.      XR CHEST PORTABLE    Result Date: 8/14/2021  EXAMINATION: ONE XRAY VIEW OF THE CHEST 8/14/2021 10:35 am COMPARISON: 11/01/2019 HISTORY: ORDERING SYSTEM PROVIDED HISTORY: Fever, hypoxia, tachycardia, tachypnea TECHNOLOGIST PROVIDED HISTORY: Reason for exam:->Fever, hypoxia, tachycardia, tachypnea FINDINGS: The cardiac silhouette is prominent. Pulmonary vessels are borderline prominent. In the mid and lower lungs there are interstitial-alveolar opacities, mildly inhomogeneous. No pleural effusion is seen. There is mild thickening of the minor fissure, positional versus pleural fluid. Interstitial and alveolar opacities in the mid and lower lungs, equivocal for pulmonary edema or pneumonia. MRI BRAIN WO CONTRAST    Result Date: 8/18/2021  EXAMINATION: MRI OF THE BRAIN WITHOUT CONTRAST  8/18/2021 7:06 pm TECHNIQUE: Multiplanar multisequence MRI of the brain was performed without the administration of intravenous contrast. COMPARISON: CT brain August 14, 2021. HISTORY: ORDERING SYSTEM PROVIDED HISTORY: Falls TECHNOLOGIST PROVIDED HISTORY: Reason for exam:->Falls Reason for Exam: Frequent falls Acuity: Acute Type of Exam: Unknown FINDINGS: INTRACRANIAL STRUCTURES/VENTRICLES: There is no acute infarct. Diffuse volume loss with chronic white matter changes in the periventricular white matter subcortical white matter as well as in the omar. No evidence for blood products on gradient imaging. No mass effect or midline shift. No evidence of an acute intracranial hemorrhage. The ventricles and sulci are normal in size and configuration. .  The normal signal voids within the major intracranial vessels appear maintained. ORBITS: The visualized portion of the orbits demonstrate no acute abnormality. SINUSES: The visualized paranasal sinuses and mastoid air cells are well aerated. BONES/SOFT TISSUES: The bone marrow signal intensity appears normal. The soft tissues demonstrate no acute abnormality. No acute infarct. Diffuse white matter changes in the periventricular white matter subcortical white matter with diffuse volume loss.   No evidence for blood products on gradient imaging     Fluoroscopy modified barium swallow with video    Result Date: 8/18/2021  EXAMINATION:

## 2021-08-19 NOTE — PROGRESS NOTES
2 more attempts made to obtain a new IV by another RN. Unsuccessful. Pt requested \"to be left alone and not poked on anymore\".

## 2021-08-20 ENCOUNTER — CARE COORDINATION (OUTPATIENT)
Dept: CASE MANAGEMENT | Age: 86
End: 2021-08-20

## 2021-08-20 ENCOUNTER — TELEPHONE (OUTPATIENT)
Dept: PHARMACY | Age: 86
End: 2021-08-20

## 2021-08-20 NOTE — TELEPHONE ENCOUNTER
----- Message from AGUSTIN Wilburn [facility] sent at 8/19/2021  4:49 PM EDT -----  Please call patient Friday, 8/20 about recent discharge and get schedule into clinic next week (see note)    Called and spoke with patient. Patient passed phone off to daughter as she had been managing meds / appointments. Daughter states she will be going out of town and therefor will not be able to take patient next week. Daughter to have patient's son call clinic to schedule as he is the one who will be bringing him in. No RTC scheduled at this time.     Robyn Johnston, PharmD  [id number]

## 2021-08-20 NOTE — CARE COORDINATION
Ed 45 Transitions Initial Follow Up Call    Call within 2 business days of discharge: Yes    Patient: Mercedes Mejia Patient : 1930   MRN: [id number]  Reason for Admission:   Discharge Date: 21 RARS: Readmission Risk Score: 17      Last Discharge [facility]       Complaint Diagnosis Description Type Department Provider    21 Fatigue Acute respiratory failure with hypoxia ([facility] [address] . .. ED to Hosp-Admission (Discharged) (ADMITTED) Jesenia Sheets MD; [facility] .. Initial 24 hr call attempted, contact info left on . PC made to [facility] they have the referral and will be reaching out to the pt today.         Follow Up  Future Appointments   Date Time Provider Mimi Nelson   2021  1:00 PM SULEMAN Arango - CNP FF Cardio MMA   2021  9:40 AM Yumiko Luevano MD [facility] - DYGRADY   2021  8:15 AM Alvaro Levine MD FF Cardio [facility]       Broderick Ureña RN

## 2021-08-23 ENCOUNTER — TELEPHONE (OUTPATIENT)
Dept: INTERNAL MEDICINE CLINIC | Age: 86
End: 2021-08-23

## 2021-08-23 ENCOUNTER — CARE COORDINATION (OUTPATIENT)
Dept: CASE MANAGEMENT | Age: 86
End: 2021-08-23

## 2021-08-23 LAB — INR BLD: 1.3

## 2021-08-23 NOTE — TELEPHONE ENCOUNTER
Called and spoke with son, states that [facility] is coming out this week to check INR. He didn't know what dates they were coming but he will call and ask then let us know. Will put on schedule for today.      Angel Ty, CeceliaD, [facility]

## 2021-08-23 NOTE — TELEPHONE ENCOUNTER
Grace Bird from [address] calling to let  know that they start pt with home care Saturday. She states that pt has a lower left leg wound. She wants to request get orders for a wound care kit, and she is looking for a diagnosis on wound. She says that his son says he has been treated for cellulitis.

## 2021-08-23 NOTE — TELEPHONE ENCOUNTER
Laura with [facility] called and stated that patient is in [facility]. Asked to have INR checked today and called in to the clinic since [facility] is going out to see pt today.

## 2021-08-23 NOTE — TELEPHONE ENCOUNTER
He is in need of an office visit based on home health order requirements. If they prefer, we can try to do a VV. OK for wound care kit, but an assessment is needed for a dx.

## 2021-08-24 ENCOUNTER — ANTI-COAG VISIT (OUTPATIENT)
Dept: PHARMACY | Age: 86
End: 2021-08-24

## 2021-08-24 DIAGNOSIS — I48.91 ATRIAL FIBRILLATION, UNSPECIFIED TYPE (HCC): Primary | ICD-10-CM

## 2021-08-24 NOTE — PROGRESS NOTES
INR 1.3 Patient taking warfarin 5mg daily in the AM. [facility] [phone] with warfarin dosing and order for next INR check. Gemma Jimenez would like to clarify the time of day patient should be taking his warfarin. Pt was admitted 8/14-8/19 for cough/SOB, aspiration PNA, afib, leg cellulitis. He got IV abx and was COVID neg. INR was 5.81 on admit. Patient was dcd on cefdinir for 5 days and quetiapine.       8/14, 8/15, 8/16 held   8/17- 5mg, INR 2.1  8/18- 5mg, INR 1.52   8/19 discharged, INR 1.36    Returned call to [facility]. Instructed for patient to take 5 mg daily. Recheck INR on Thurs 8/26.  Any questions or concerns return call to clinic [phone]      For Pharmacy Admin Tracking Only     Total # of Interventions Recommended: 0   Total # of Interventions Accepted: 0   Time Spent (min): 15

## 2021-08-25 ENCOUNTER — TELEPHONE (OUTPATIENT)
Dept: PHARMACY | Age: 86
End: 2021-08-25

## 2021-08-25 NOTE — TELEPHONE ENCOUNTER
Phoebe HAMILTONN called to see if she could move patient's INR from Thursday to Friday. Explained we don't usually do INR's on Friday because, if for some reason they cannot be done, the patient has to go all weekend with blind dosing and it is difficult to get patient back on schedule. She asked about today and I let her know that patient had just had an INR done yesterday. Told her we would reschedule to Friday, 8/27 and try to get the schedule adjusted next week.

## 2021-08-27 ENCOUNTER — ANTI-COAG VISIT (OUTPATIENT)
Dept: PHARMACY | Age: 86
End: 2021-08-27

## 2021-08-27 DIAGNOSIS — I48.91 ATRIAL FIBRILLATION, UNSPECIFIED TYPE (HCC): Primary | ICD-10-CM

## 2021-08-27 LAB — INR BLD: 1.3

## 2021-08-27 NOTE — PROGRESS NOTES
Te called, INR 1.3, pt states more greens. No missed doses reported. States taking 5mg daily. Done with abx. Will have patient take 7.5mg tonight then continue weekly dose of 5 mg daily and recheck on Monday, 8/30. Will need RF called in Monday.     Dina Schafer, PharmD, [facility] Tracking Only     Intervention Detail: Dose Adjustment: 1, reason: Therapy Optimization   Total # of Interventions Recommended: 1   Total # of Interventions Accepted: 1   Time Spent (min): 15

## 2021-08-30 LAB — INR BLD: 1.8

## 2021-08-31 ENCOUNTER — ANTI-COAG VISIT (OUTPATIENT)
Dept: PHARMACY | Age: 86
End: 2021-08-31

## 2021-08-31 DIAGNOSIS — I48.91 ATRIAL FIBRILLATION, UNSPECIFIED TYPE (HCC): Primary | ICD-10-CM

## 2021-08-31 NOTE — PROGRESS NOTES
Called Te about INR yesterday, it was late. Confirmed dose, had a lot of greens. Instructed to have patient take 7.5mg tonight then continue weekly dose of 5 mg daily since INR was 1.8 which is below goal range of 2-3. Will check INR again on Friday, 9/3.     RF called into [facility].     For Pharmacy Admin Tracking Only     Intervention Detail: Dose Adjustment: 1, reason: Therapy Optimization and Refill(s) Provided   Total # of Interventions Recommended: 2   Total # of Interventions Accepted: 2   Time Spent (min): 15

## 2021-09-03 ENCOUNTER — ANTI-COAG VISIT (OUTPATIENT)
Dept: PHARMACY | Age: 86
End: 2021-09-03

## 2021-09-03 ENCOUNTER — TELEPHONE (OUTPATIENT)
Dept: INTERNAL MEDICINE CLINIC | Age: 86
End: 2021-09-03

## 2021-09-03 DIAGNOSIS — I48.91 ATRIAL FIBRILLATION, UNSPECIFIED TYPE (HCC): Primary | ICD-10-CM

## 2021-09-03 LAB — INR BLD: 2.3

## 2021-09-03 NOTE — PROGRESS NOTES
PT 27.6 / INR 2.3 Patient took 5mg warfarin on Tue/Thur and 7.5mg on Wed.  [phone] warfarin dosing and order for next INR check. Returned call to [facility]. Instructed for patient to take 7.5 mg on Wed and 5 mg all other days of the week.  Recheck INR in 1 week, 9/10      For Pharmacy Admin Tracking Only     Intervention Detail: Dose Adjustment: 1, reason: Therapy Optimization   Total # of Interventions Recommended: 1   Total # of Interventions Accepted: 1   Time Spent (min): 15

## 2021-09-03 NOTE — TELEPHONE ENCOUNTER
Mauri from [facility] called back. They want to disregard the order to d/c home care. His left calf has a wound. The area was scabbed over and wasn't draining before. Now the wound has reopened and is now draining. They are using Kerlix but it doesn't stay in place. He has calcium agminate available, can they use that with dry dressing? Please advise.

## 2021-09-10 ENCOUNTER — ANTI-COAG VISIT (OUTPATIENT)
Dept: PHARMACY | Age: 86
End: 2021-09-10

## 2021-09-10 DIAGNOSIS — I48.91 ATRIAL FIBRILLATION, UNSPECIFIED TYPE (HCC): Primary | ICD-10-CM

## 2021-09-10 NOTE — PROGRESS NOTES
Mauri w/ [facility] called w/ pts INR results of 2.4, takes 7.5 mgs of warfarin on Wed, and 5 mgs all other days. No med or diet changes. [phone]     Returned call to [facility] [place]. Instructed for patient to take 7.5 mg on Wed and 5 mg all other days of the week.  Recheck INR in 1 week, 9/17    For Pharmacy Admin Tracking Only     Total # of Interventions Recommended: 1   Total # of Interventions Accepted: 1   Time Spent (min): 15

## 2021-09-14 ENCOUNTER — TELEPHONE (OUTPATIENT)
Dept: INTERNAL MEDICINE CLINIC | Age: 86
End: 2021-09-14

## 2021-09-14 NOTE — TELEPHONE ENCOUNTER
Navarro Mcpherson from [facility] called. She was looking at his medications today and need to verify. Metoprolol   The pt has 100 mg tablets but pharmacy still has him taking 1/2 tablet. The Rx was sent to the wrong pharmacy. Will need new one sent to [facility] mail order when out of current supply. don't need it now. Just FYI    Finasteride 5 mg  Patient has been taking this daily. This is not on the medication list.  prescribed it 8/25/21. Continue? Quetiapine 25 mg  They have this listed on his medication list. Patient reports that he is not taking this anymore. Correct?

## 2021-09-14 NOTE — TELEPHONE ENCOUNTER
If his pulse is in the normal range, then OK to continue 50mg of Toprol. Continue finasteride  D/c quetiapine.   He should have a hospital follow up visit

## 2021-09-16 ENCOUNTER — OFFICE VISIT (OUTPATIENT)
Dept: CARDIOLOGY CLINIC | Age: 86
End: 2021-09-16
Payer: MEDICARE

## 2021-09-16 VITALS
DIASTOLIC BLOOD PRESSURE: 82 MMHG | WEIGHT: 223.6 LBS | HEART RATE: 74 BPM | BODY MASS INDEX: 32.01 KG/M2 | SYSTOLIC BLOOD PRESSURE: 138 MMHG | OXYGEN SATURATION: 95 % | HEIGHT: 70 IN

## 2021-09-16 DIAGNOSIS — E78.2 MIXED HYPERLIPIDEMIA: ICD-10-CM

## 2021-09-16 DIAGNOSIS — I35.1 NONRHEUMATIC AORTIC VALVE INSUFFICIENCY: ICD-10-CM

## 2021-09-16 DIAGNOSIS — I25.10 CORONARY ARTERY DISEASE INVOLVING NATIVE CORONARY ARTERY OF NATIVE HEART WITHOUT ANGINA PECTORIS: Primary | ICD-10-CM

## 2021-09-16 DIAGNOSIS — I47.29 NSVT (NONSUSTAINED VENTRICULAR TACHYCARDIA): ICD-10-CM

## 2021-09-16 DIAGNOSIS — I48.21 PERMANENT ATRIAL FIBRILLATION (HCC): ICD-10-CM

## 2021-09-16 DIAGNOSIS — I10 ESSENTIAL HYPERTENSION: ICD-10-CM

## 2021-09-16 DIAGNOSIS — I77.9 BILATERAL CAROTID ARTERY DISEASE, UNSPECIFIED TYPE (HCC): ICD-10-CM

## 2021-09-16 PROCEDURE — 99214 OFFICE O/P EST MOD 30 MIN: CPT | Performed by: NURSE PRACTITIONER

## 2021-09-16 PROCEDURE — 1036F TOBACCO NON-USER: CPT | Performed by: NURSE PRACTITIONER

## 2021-09-16 PROCEDURE — G8417 CALC BMI ABV UP PARAM F/U: HCPCS | Performed by: NURSE PRACTITIONER

## 2021-09-16 PROCEDURE — 4040F PNEUMOC VAC/ADMIN/RCVD: CPT | Performed by: NURSE PRACTITIONER

## 2021-09-16 PROCEDURE — 1123F ACP DISCUSS/DSCN MKR DOCD: CPT | Performed by: NURSE PRACTITIONER

## 2021-09-16 PROCEDURE — G8427 DOCREV CUR MEDS BY ELIG CLIN: HCPCS | Performed by: NURSE PRACTITIONER

## 2021-09-16 PROCEDURE — 1111F DSCHRG MED/CURRENT MED MERGE: CPT | Performed by: NURSE PRACTITIONER

## 2021-09-16 RX ORDER — FINASTERIDE 5 MG/1
TABLET, FILM COATED ORAL
COMMUNITY
Start: 2021-08-26

## 2021-09-16 RX ORDER — FUROSEMIDE 20 MG/1
20 TABLET ORAL DAILY PRN
Qty: 30 TABLET | Refills: 0 | Status: SHIPPED | OUTPATIENT
Start: 2021-09-16 | End: 2022-06-17

## 2021-09-16 NOTE — PROGRESS NOTES
[address]     Outpatient Follow Up Note    CHIEF COMPLAINT / HPI: Hospital Follow Up secondary to atrial fibrillation     Hospital record has been reviewed  Hospital Course progressed as follows per discharge summary:     8/14/20-8/18/21  Hospital course:  79 yo M with history of CAD, Afib on Coumadin, DM2, BPH who came to ER with cough and SOB.  Admitted as inpatient for aspiration PNA, acute respiratory failure with hypoxia and Afib with RVR.  Noted to have drainage from legs with L leg cellulitis and aspiration PNA.  Started on IV Cefepime.  Followed by Cardiology.  COVID negative.  Required Monae for BPH with obstruction. Increased Flomax 0.8 mg daily. Void trial failed on 8/19 and will go home with Monae. Can call [facility] office to make appt with Lorri James (Urology NP) who can void trial in office next week and replace Monae if he fails.       Went into NSVT for 90 seconds on 8/17/21, Toprol XL increased to 100 mg daily.       MRI Brain to evaluate sundowning,  No acute infarct.  Diffuse white matter changes in the periventricular white   matter subcortical white matter with diffuse volume loss.  No evidence for   blood products on gradient imaging     Started on Seroquel 12.5 mg qhs.     .  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  Noted to have pancreatic lesion on CT Chest, offered MRI Abdomen and patient refused. Suspect patient came with BPH with obstruction, developed fluid overload and went into Afib with RVR. He improved after Monae and IV Lasix. Son is pharmacist and POA. Met with Palliative care. Agreed to follow with palliative care from [facility]. Dee Hanks is 80 y.o. male who presents today for a routine follow up after a recent hospitalization related to the above mentioned issues. Subjective:   Since the time of discharge, the patient states he has been feeling good.  He has HHRN and acetate (PRED FORTE) 1 % ophthalmic suspension Place 1 drop into the left eye 3 times daily       Lancets Thin MISC 2-4 Devices by Does not apply route daily 2-4 leilani daily 300 each 3    bimatoprost (LUMIGAN) 0.01 % SOLN ophthalmic drops Place 1 drop into the right eye nightly       dorzolamide (TRUSOPT) 2 % ophthalmic solution Place 1 drop into the right eye 2 times daily       warfarin (COUMADIN) 5 MG tablet Take 7.5 mg (one and a half tablets) coumadin every evening until directed otherwise by Dr. Gwyn Argueta (Patient taking differently: Take 5 mg by mouth daily ) 90 tablet 3    brimonidine-timolol (COMBIGAN) 0.2-0.5 % ophthalmic solution Place 1 drop into the right eye every 12 hours        No current facility-administered medications for this visit. REVIEW OF SYSTEMS:   CONSTITUTIONAL: No major weight gain or loss, fatigue night sweats or fever. There's been no change in energy level, sleep pattern, or activity level. Generalized weakness improving      HEENT: No new vision difficulties or ringing in the ears. RESPIRATORY: No new SOB, PND, orthopnea or cough. CARDIOVASCULAR: See HPI  GI: No nausea, vomiting, diarrhea, constipation, abdominal pain or changes in bowel habits. : No urinary frequency, urgency, incontinence hematuria or dysuria. SKIN: No cyanosis or skin lesions. MUSCULOSKELETAL: No new muscle or joint pain. NEUROLOGICAL: No syncope or TIA-like symptoms. PSYCHIATRIC: No anxiety, pain, insomnia or depression    Objective:   PHYSICAL EXAM:        VITALS:  /82 (Site: Right Upper Arm, Position: Sitting, Cuff Size: Medium Adult)   Pulse 74   Ht 5' 10\" (1.778 m)   Wt 223 lb 9.6 oz (101.4 kg)   SpO2 95%   BMI 32.08 kg/m²     CONSTITUTIONAL: Cooperative, no apparent distress, and appears well nourished / developed  NEUROLOGIC:  Awake and orientated to person, place and time. PSYCH: Calm affect. SKIN: Warm and dry.   HEENT: Sclera non-icteric, normocephalic, neck supple, no elevation of JVP, normal carotid pulses with no bruits and thyroid normal size. LUNGS:  No increased work of breathing and clear to auscultation, no wheezing. Fine crackles R base   CARDIOVASCULAR:  Regular rate and rhythm with no murmurs, gallops, rubs, or abnormal heart sounds, normal PMI. The apical impulses not displaced. Heart tones are crisp and normal                                                              ABDOMEN:  Normal bowel sounds, non-distended and non-tender to palpation  EXT: trace edema BLE  no calf tenderness. Pulses are present bilaterally. DATA:    Lab Results   Component Value Date    ALT 10 08/14/2021    AST 16 08/14/2021    ALKPHOS 83 08/14/2021    BILITOT 0.9 08/14/2021     Lab Results   Component Value Date    CREATININE 0.6 (L) 08/19/2021    BUN 20 08/19/2021     08/19/2021    K 4.1 08/19/2021     08/19/2021    CO2 25 08/19/2021     Lab Results   Component Value Date    TSH 1.85 08/18/2011     Lab Results   Component Value Date    WBC 6.2 08/19/2021    HGB 13.1 (L) 08/19/2021    HCT 38.5 (L) 08/19/2021    MCV 94.2 08/19/2021     08/19/2021     No components found for: CHLPL  Lab Results   Component Value Date    TRIG 75 12/10/2020    TRIG 117 06/03/2019    TRIG 165 (H) 05/30/2018     Lab Results   Component Value Date    HDL 40 12/10/2020    HDL 38 (L) 06/03/2019    HDL 42 05/30/2018     Lab Results   Component Value Date    LDLCALC 36 12/10/2020    LDLCALC 35 06/03/2019    LDLCALC 36 05/30/2018     Lab Results   Component Value Date    LABVLDL 15 12/10/2020    LABVLDL 23 06/03/2019    LABVLDL 33 05/30/2018     Radiology Review:  Pertinent images / reports were reviewed as a part of this visit and reveals the following:    Last Echo 8/19/21:  Summary   Left ventricular cavity size is normal with mild concentric left ventricular   hypertrophy. Overall left ventricular systolic function appears normal.   Ejection fraction is visually estimated to be 55-60%.    No regional wall motion abnormalities are noted. Unable to assess diastology due to atrial fibrillation. Trivial mitral regurgitation. Mild aortic valve calcification without any evidence of aortic stenosis. Moderate eccentric aortic regurgitation. Mild-to-moderate tricuspid regurgitation with a PASP of 34 mmHg. The ascending aorta is incompletely visualized but is likely mildly dilated. Consider cross sectional imaging if clinically indicated. Last ECG 8/14/21:  Atrial fibrillation with rapid ventricular response  Left axis deviation  Left bundle branch block  Abnormal ECG     Assessment:      Diagnosis Orders   1. Coronary artery disease involving native coronary artery of native heart without angina pectoris   ~ s/p PCI to LAD in 2005   ~ BB /statin, no ASA d/t warfarin  ~ stable, no complaints of angina     2. Essential hypertension   ~ controlled; 138/82 in office today     3. Mixed hyperlipidemia   ~ simvastatin 20  ~ lipids 12/10/20 TC 91 HDL 40 LDL 36 Trig 75    4. Permanent atrial fibrillation (HCC)   ~ coumadin (INR managed by coumadin clinic)   ~ diltiazem 120 / Toprol 100  ~ VR 74 on exam today     5. Bilateral carotid artery disease, unspecified type ([address]   ~ s/p LCEA 2/2014  ~ managed by Dr. Teresa Sauer  ~ on statin      6. NSVT (nonsustained ventricular tachycardia) (HCC)   ~ noted on tele in the hospital  ~ Toprol increased to 100 mg     7. Nonrheumatic aortic valve insufficiency   ~ moderate eccentric AR per echo 8/16/21    8. BPH           ~ follows with urology     Patient is stable since hospital discharge. Plan:   1. Fine crackles in R base. HHRN to call me tomorrow to let me know if they are still there. 2. Son concerned about persistent (but improving) swelling. 20 mg of PRN lasix ordered for swelling/SOB. Take one the next two mornings. He is not to take more than 3/week. 3. Continue weighing daily and call for 3lb weight gain overnight or 5lb in a week.   4. Appointment scheduled with Dr. Gloria Dumont in December    I have addressed the patient's cardiac risk factors and adjusted pharmacologic treatment as needed. In addition, I have reinforced the need for patient directed risk factor modification. Further evaluation will be based upon the patient's clinical course and testing results. All questions and concerns were addressed to the patient/family (son)    The patient currently is notsmoking. The risks related to smoking were reviewed with the patient. Recommend maintaining a smoke-free lifestyle. Dual Antiplatelet therapy / anti-coagulation has not been recommended / prescribed for this patient. Pt is on a BB  Pt is not on an ace-i/ARB; no HLD  Pt is on a statin      Saturated fat diet discussed  Exercise program discussed    Thank you for allowing to us to participate in the care of Morrow Sequin.       [facility]  Documentation of today's visit sent to PCP

## 2021-09-16 NOTE — PATIENT INSTRUCTIONS
Have home health nurse call me tomorrow and let me know if she has heard crackles in lungs     As needed lasix ordered for swelling or shortness of breath. Do not take more than 3x a week. Keep weighing yourself daily.  Call for weight gain of 3 lbs overnight, 5 lbs in a week

## 2021-09-17 ENCOUNTER — ANTI-COAG VISIT (OUTPATIENT)
Dept: PHARMACY | Age: 86
End: 2021-09-17

## 2021-09-17 ENCOUNTER — TELEPHONE (OUTPATIENT)
Dept: CARDIOLOGY CLINIC | Age: 86
End: 2021-09-17

## 2021-09-17 DIAGNOSIS — I48.91 ATRIAL FIBRILLATION, UNSPECIFIED TYPE (HCC): Primary | ICD-10-CM

## 2021-09-17 LAB — INTERNATIONAL NORMALIZATION RATIO, POC: 2.1

## 2021-09-17 NOTE — PROGRESS NOTES
Maggy Cost called in results for patient. INR 2.1  Pt took 7.5 mg on Wed and 5 mg all other days of the week. Denies missed doses, changes in meds or diet. Continue same weekly dose of 7.5 mg on Wed and 5 mg all other days of the week.  Recheck INR in 10 days, 9/27    For Pharmacy Admin Tracking Only     Total # of Interventions Recommended: 0   Total # of Interventions Accepted: 0   Time Spent (min): 15

## 2021-09-27 ENCOUNTER — ANTI-COAG VISIT (OUTPATIENT)
Dept: PHARMACY | Age: 86
End: 2021-09-27

## 2021-09-27 DIAGNOSIS — I48.91 ATRIAL FIBRILLATION, UNSPECIFIED TYPE (HCC): Primary | ICD-10-CM

## 2021-09-27 LAB — INR BLD: 2.5

## 2021-09-27 NOTE — PROGRESS NOTES
PT 30.1 / INR 2.5 Patient took 7.5mg warfarin on Wed and 5mg all other days. Please call [facility] [phone] with warfarin dosing and order for next INR check. Called Tatiana back. Instructed to have patient take 7.5mg on Wed and 5 mg all other days of the week since INR was 2.5 which is within goal range of 2-3. Will check INR again in 10 days, 10/7.       For Pharmacy Admin Tracking Only     Total # of Interventions Recommended: 0   Total # of Interventions Accepted: 0   Time Spent (min): 15

## 2021-10-07 ENCOUNTER — ANTI-COAG VISIT (OUTPATIENT)
Dept: PHARMACY | Age: 86
End: 2021-10-07

## 2021-10-07 DIAGNOSIS — I48.91 ATRIAL FIBRILLATION, UNSPECIFIED TYPE (HCC): Primary | ICD-10-CM

## 2021-10-07 LAB — INR BLD: 2.7

## 2021-10-07 NOTE — PROGRESS NOTES
Mauri pts [place] [id number] nurse called w/ INR results of 2.7, pt takes 7.5 mgs of warfarin on Wed and 5 mgs all other days, no med or diet changes. [phone]       Returned call to [facility] [place]. Instructed for patient to continue to take 7.5 mg on Wed and 5 mg all other days of the week.  Recheck INR in 2 weeks, 10/21      For Pharmacy Admin Tracking Only     Total # of Interventions Recommended: 0   Total # of Interventions Accepted: 0   Time Spent (min): 15

## 2021-10-15 ENCOUNTER — PATIENT MESSAGE (OUTPATIENT)
Dept: INTERNAL MEDICINE CLINIC | Age: 86
End: 2021-10-15

## 2021-10-15 NOTE — TELEPHONE ENCOUNTER
From: Kp Butler  To: Otoniel Mills MD  Sent: 10/15/2021 4:07 PM EDT  Subject: Prescription Question    this is a request for a refill on tamsulosin as i am running out . when hospitalized in Sept I was placed on two caps a day hence i'm running short. I have an appointment in your office on November 29th If enough can be phoned in till then that would be great.  thanks

## 2021-10-18 RX ORDER — TAMSULOSIN HYDROCHLORIDE 0.4 MG/1
0.8 CAPSULE ORAL DAILY
Qty: 60 CAPSULE | Refills: 1 | Status: SHIPPED | OUTPATIENT
Start: 2021-10-18 | End: 2021-10-20 | Stop reason: SDUPTHER

## 2021-10-18 RX ORDER — SIMVASTATIN 20 MG
20 TABLET ORAL NIGHTLY
Qty: 90 TABLET | Refills: 1 | Status: SHIPPED | OUTPATIENT
Start: 2021-10-18 | End: 2022-03-17

## 2021-10-20 ENCOUNTER — TELEPHONE (OUTPATIENT)
Dept: INTERNAL MEDICINE CLINIC | Age: 86
End: 2021-10-20

## 2021-10-20 NOTE — TELEPHONE ENCOUNTER
[facility] calling---the Tamsulosin was sent to [facility] instead of [facility]---can you please resend to correct pharmacy with 90 day supply---Thanks. l

## 2021-10-21 ENCOUNTER — TELEPHONE (OUTPATIENT)
Dept: PHARMACY | Age: 86
End: 2021-10-21

## 2021-10-21 RX ORDER — TAMSULOSIN HYDROCHLORIDE 0.4 MG/1
0.8 CAPSULE ORAL DAILY
Qty: 180 CAPSULE | Refills: 1 | Status: SHIPPED | OUTPATIENT
Start: 2021-10-21 | End: 2022-03-17

## 2021-10-21 NOTE — TELEPHONE ENCOUNTER
Pt was dcd from [facility] on 10/19 after calling [facility] after not getting INR today. Called pt to get scheduled into clinic since were were not notified. LVM.     Destiny Conn, PharmD, [facility]

## 2021-10-25 NOTE — TELEPHONE ENCOUNTER
Pts son Ashely Almonte left v/m asking to call him to schedule pts [facility] appt. C has been d/lilian.   Riccardo Koch to schedule patient in to [facility]. Sister will be bringing him since Ashely Almonte is oot. Scheduled for Thursday, 10/28.     Sandra Raya, CeceliaD, [facility]

## 2021-10-28 ENCOUNTER — ANTI-COAG VISIT (OUTPATIENT)
Dept: PHARMACY | Age: 86
End: 2021-10-28
Payer: MEDICARE

## 2021-10-28 DIAGNOSIS — I48.91 ATRIAL FIBRILLATION, UNSPECIFIED TYPE (HCC): Primary | ICD-10-CM

## 2021-10-28 LAB — INTERNATIONAL NORMALIZATION RATIO, POC: 2.5

## 2021-10-28 PROCEDURE — 85610 PROTHROMBIN TIME: CPT

## 2021-10-28 PROCEDURE — 99211 OFF/OP EST MAY X REQ PHY/QHP: CPT

## 2021-10-28 NOTE — PROGRESS NOTES
11/29    Patient consent signed 10/28/21    Referring cardiologist is Dr. Marely Kelley  PCP Dr. Eleonora Mccoy  INR (no units)   Date Value   10/07/2021 2.70   09/27/2021 2.50   09/17/2021 2.1   09/03/2021 2.30   08/30/2021 1.80     For Pharmacy Admin Tracking Only     Total # of Interventions Recommended: 0   Total # of Interventions Accepted: 0   Time Spent (min): 15

## 2021-11-08 RX ORDER — GLIMEPIRIDE 1 MG/1
TABLET ORAL
Qty: 90 TABLET | Refills: 1 | Status: SHIPPED | OUTPATIENT
Start: 2021-11-08 | End: 2022-04-04

## 2021-11-16 NOTE — PROGRESS NOTES
[facility]   Cardiac Follow up     Referring Provider:  Shira Tapia MD     Chief Complaint   Patient presents with    6 Month Follow-Up    Coronary Artery Disease    Hypertension    Hyperlipidemia      History of Present Illness:  Mr. Philip Mancini is a 80 y.o. male with a history of  CAD (JURGEN to LAD in 2005), hypertension, atrial fib, hyperlipidemia. He did have carotid surgery in Feb 2014. States he stopped smoking in the \"70's.     8/14/20-8/18/21  Hospital course:  79 yo M with history of CAD, Afib on Coumadin, DM2, BPH who came to ER with cough and SOB.  Admitted as inpatient for aspiration PNA, acute respiratory failure with hypoxia and Afib with RVR.  Noted to have drainage from legs with L leg cellulitis and aspiration PNA.  Started on IV Cefepime.  Followed by Cardiology.  COVID negative.  Required Monae for BPH with obstruction.  Increased Flomax 0.8 mg daily.  Void trial failed on 8/19 and will go home with Monae.  Can call [facility] office to make appt with Nereyda Estevez (Urology NP) who can void trial in office next week and replace Monae if he fails.       Went into NSVT for 90 seconds on 8/17/21, Toprol XL increased to 100 mg daily.  Suspect patient came with BPH with obstruction, developed fluid overload and went into Afib with RVR.  He improved after Monae and IV Lasix. Today, he states he is doing ok. Uses a  walker for balance assist.  Denies exertional chest pain,  palpitations, light-headedness, edema. He is not symptomatic with afib. He has some shortness of breath with inclines. His pulse was elevated on arrival, improved with rest. He denies palpations. .     Past Medical History:   has a past medical history of Acute MI ([address] Arthritis, Atrial fibrillation ([address] CAD (coronary artery disease), Cancer of colon ([address] Glaucoma, Gout, unspecified, Hypertension, Hypertrophy of prostate without urinary obstruction and other lower urinary tract symptoms (LUTS), Other and unspecified hyperlipidemia, and Type II or unspecified type diabetes mellitus without mention of complication, uncontrolled. Surgical History:   has a past surgical history that includes Colon surgery; Cataract removal; Coronary angioplasty with stent; other surgical history; Revision total knee arthroplasty (Right, 08/17/2011); Abdomen surgery (1992); Colonoscopy; Carotid endarterectomy (Left, 2/24/13); Cystocopy (3-27-14); joint replacement (2010); Eye surgery (Left, 03/02/2018); Eye surgery (Left, 04/13/2018); and eye surgery (Left, 2009). Social History:   reports that he quit smoking about 45 years ago. He has a 30.00 pack-year smoking history. He has never used smokeless tobacco. He reports current alcohol use of about 10.0 standard drinks of alcohol per week. He reports that he does not use drugs. Family History:  family history includes Heart Disease in his mother; High Blood Pressure in his father and mother; Stroke in his mother.      Current Outpatient Medications   Medication Sig Dispense Refill    metoprolol succinate (TOPROL XL) 100 MG extended release tablet Take 1 tablet by mouth daily 30 tablet 0    glimepiride (AMARYL) 1 MG tablet TAKE 1 TABLET EVERY DAY 90 tablet 1    tamsulosin (FLOMAX) 0.4 MG capsule Take 2 capsules by mouth daily 180 capsule 1    simvastatin (ZOCOR) 20 MG tablet Take 1 tablet by mouth nightly 90 tablet 1    finasteride (PROSCAR) 5 MG tablet       dilTIAZem (CARDIZEM CD) 120 MG extended release capsule Take 1 capsule daily 90 capsule 3    ONETOUCH VERIO strip 1 each by In Vitro route 4 times daily Test 2-4 times a day E11.9 300 each 3    prednisoLONE acetate (PRED FORTE) 1 % ophthalmic suspension Place 1 drop into the left eye 3 times daily       Lancets Thin MISC 2-4 Devices by Does not apply route daily 2-4 leilani daily 300 each 3    bimatoprost (LUMIGAN) 0.01 % SOLN ophthalmic drops Place 1 drop into the right eye nightly       dorzolamide (TRUSOPT) 2 % ophthalmic solution Place 1 drop into the right eye 2 times daily       warfarin (COUMADIN) 5 MG tablet Take 7.5 mg (one and a half tablets) coumadin every evening until directed otherwise by Dr. Zoie Pozo (Patient taking differently: Take 5 mg by mouth daily ) 90 tablet 3    brimonidine-timolol (COMBIGAN) 0.2-0.5 % ophthalmic solution Place 1 drop into the right eye every 12 hours       furosemide (LASIX) 20 MG tablet Take 1 tablet by mouth daily as needed (swelling) (Patient not taking: Reported on 12/16/2021) 30 tablet 0     No current facility-administered medications for this visit. Allergies:  Hydrochlorothiazide, Metformin and related, and Morphine     Review of Systems:   · Constitutional: there has been no unanticipated weight loss. There's been no change in energy level, sleep pattern, or activity level. · Eyes: decreased vision-macular degeneration   · ENT: No Headaches, hearing loss or vertigo. No mouth sores or sore throat. · Cardiovascular: Reviewed in HPI  · Respiratory: No cough or wheezing, no sputum production. No hematemesis. · Gastrointestinal: No abdominal pain, appetite loss, blood in stools. No change in bowel or bladder habits. · Genitourinary: No dysuria, trouble voiding, or hematuria. · Musculoskeletal:  No gait disturbance, weakness. Right knee pain. · Integumentary: No rash or pruritis. · Neurological: No headache, diplopia, change in muscle strength, numbness or tingling. No change in gait, balance, coordination, mood, affect, memory, mentation, behavior. · Psychiatric: No anxiety, no depression. · Endocrine: No malaise, fatigue or temperature intolerance. No excessive thirst, fluid intake, or urination. No tremor. · Hematologic/Lymphatic: No abnormal bruising or bleeding, blood clots or swollen lymph nodes. · Allergic/Immunologic: No nasal congestion or hives.     Physical Examination:    Vitals:    12/16/21 0811   BP: 112/60   Pulse: 137   SpO2: 99% Constitutional and General Appearance: NAD, well nourished   Skin:good turgor,intact without lesions  HEENT: EOMI ,normal  Neck:no JVD  Respiratory:  · Normal excursion and expansion without use of accessory muscles  · Resp Auscultation: Normal breath sounds without dullness  Cardiovascular:  · The apical impulses not displaced  · Heart tones are crisp and normal  · Cervical veins are not engorged  · The carotid upstroke is normal in amplitude and contour without delay or bruit  · Normal heart tone, irregular rhythm. · Peripheral pulses are symmetrical and full  · There is no clubbing, cyanosis of the extremities. · No edema  · Femoral Arteries: 2+ and equal  · Pedal Pulses: 2+ and equal   Abdomen:  · No masses or tenderness  · Liver/Spleen: No Abnormalities Noted  Neurological/Psychiatric:  · Alert and oriented in all spheres  · Moves all extremities well  · Exhibits normal gait balance and coordination  · No abnormalities of mood, affect, memory, mentation, or behavior are noted    Assessment/Plan:    1. Atrial fibrillation: Controlled. Irregular. Remains on Coumadin. INR managed by [facility] clinic. 2. CAD (coronary artery disease): Stable. No anginal symptoms. 12/05 cath> JURGEN to LAD. 3. Hypertension:  Stable. Blood pressure 112/60, pulse 137, height 5' 10\" (1.778 m), weight 224 lb (101.6 kg), SpO2 99 %. 4. Other and unspecified hyperlipidemia: Checked by PCP. Controlled - Zocor 20mg    5. Carotid artery disease: s/p left CEA 2/2014. - dopplers in feb   12/18/15 Dopplers> 1-15% ja. Currently on Zocor. 6.  DM w/o complication type II: Followed by PCP. Plan:   Amie Score has a stable cardiac status. Cardiac test and lab results personally reviewed by me during this office visit and discussed.    Keep appt for Carotid dopplers   Cancel office visit with Dr Watts - I can follow results   Script for handicap card given   Monitor weight daily- Take Lasix with 5 lbs weight gain No med changes. Continue risk factor modifications. Call for any change in symptoms. Return for regular follow up in 6 months. I appreciate the opportunity of cooperating in the care of this individual.    Allison Angulo. Loreta Mazariegos M.D., [address] [facility]    Patient's problem list, medications, allergies, past medical, surgical, social and family histories were reviewed and updated as appropriate. Scribe's attestation: This note was scribed in the presence of Dr. Loreta Mazariegos MD, by Tyrese White RN. The scribe's documentation has been prepared under my direction and personally reviewed by me in its entirety. I confirm that the note above accurately reflects all work, treatment, procedures, and medical decision making performed by me.

## 2021-11-29 ENCOUNTER — OFFICE VISIT (OUTPATIENT)
Dept: INTERNAL MEDICINE CLINIC | Age: 86
End: 2021-11-29
Payer: MEDICARE

## 2021-11-29 ENCOUNTER — ANTI-COAG VISIT (OUTPATIENT)
Dept: PHARMACY | Age: 86
End: 2021-11-29
Payer: MEDICARE

## 2021-11-29 VITALS
BODY MASS INDEX: 32.02 KG/M2 | SYSTOLIC BLOOD PRESSURE: 104 MMHG | WEIGHT: 223.69 LBS | HEART RATE: 88 BPM | HEIGHT: 70 IN | DIASTOLIC BLOOD PRESSURE: 64 MMHG

## 2021-11-29 DIAGNOSIS — E11.9 TYPE 2 DIABETES MELLITUS WITHOUT COMPLICATION, WITHOUT LONG-TERM CURRENT USE OF INSULIN (HCC): Primary | ICD-10-CM

## 2021-11-29 DIAGNOSIS — I48.91 ATRIAL FIBRILLATION, UNSPECIFIED TYPE (HCC): Primary | ICD-10-CM

## 2021-11-29 DIAGNOSIS — E78.2 MIXED HYPERLIPIDEMIA: ICD-10-CM

## 2021-11-29 DIAGNOSIS — I48.21 PERMANENT ATRIAL FIBRILLATION (HCC): ICD-10-CM

## 2021-11-29 DIAGNOSIS — I10 PRIMARY HYPERTENSION: ICD-10-CM

## 2021-11-29 PROBLEM — J96.01 ACUTE RESPIRATORY FAILURE WITH HYPOXIA (HCC): Status: RESOLVED | Noted: 2021-08-14 | Resolved: 2021-01-01

## 2021-11-29 PROBLEM — F05 SUNDOWNING: Status: RESOLVED | Noted: 2021-08-19 | Resolved: 2021-01-01

## 2021-11-29 LAB
HBA1C MFR BLD: 6.5 %
INTERNATIONAL NORMALIZATION RATIO, POC: 2.8

## 2021-11-29 PROCEDURE — G8417 CALC BMI ABV UP PARAM F/U: HCPCS | Performed by: INTERNAL MEDICINE

## 2021-11-29 PROCEDURE — 99211 OFF/OP EST MAY X REQ PHY/QHP: CPT

## 2021-11-29 PROCEDURE — 99214 OFFICE O/P EST MOD 30 MIN: CPT | Performed by: INTERNAL MEDICINE

## 2021-11-29 PROCEDURE — G8427 DOCREV CUR MEDS BY ELIG CLIN: HCPCS | Performed by: INTERNAL MEDICINE

## 2021-11-29 PROCEDURE — 1123F ACP DISCUSS/DSCN MKR DOCD: CPT | Performed by: INTERNAL MEDICINE

## 2021-11-29 PROCEDURE — 83036 HEMOGLOBIN GLYCOSYLATED A1C: CPT | Performed by: INTERNAL MEDICINE

## 2021-11-29 PROCEDURE — G8484 FLU IMMUNIZE NO ADMIN: HCPCS | Performed by: INTERNAL MEDICINE

## 2021-11-29 PROCEDURE — 1036F TOBACCO NON-USER: CPT | Performed by: INTERNAL MEDICINE

## 2021-11-29 PROCEDURE — 85610 PROTHROMBIN TIME: CPT

## 2021-11-29 PROCEDURE — 4040F PNEUMOC VAC/ADMIN/RCVD: CPT | Performed by: INTERNAL MEDICINE

## 2021-11-29 NOTE — PROGRESS NOTES
Mr. Lori Arteaga is a 80 y.o. y/o male with history of Afib   He presents today for anticoagulation monitoring and adjustment. Pertinent PMH: DM, CAD, HTN and Colon CA    Patient's wife  10/4/13. Patient Reported Findings:  Yes     No  [x]   []       Patient verifies current dosing regimen as listed   []   [x]       S/S bleeding/bruising/swelling/SOB - denies  []   [x]       Blood in urine or stool- denies   []   [x]       Procedures scheduled in the future at this time   []   [x]       Missed Dose - denies   []   [x]       Extra Dose - denies   []   [x]       Change in medications - takes tylenol as needed for knee aches---> occasional aleve 1 per day if needed ---> has been taking 4 aleve a day d/t knee pain acutely---> some aleve recently dt knee pain --> no changes  []   [x]       Change in health/diet/appetite-Has vegetables 1-2 x weekly.  ---> states less greens - down to once weekly, no NVD-->greens not even once a week--> less greens lately, no other changes, no NVD--> had broccoli once in past week, states no NVD ---> no changes---> had more greens recently --> states that continues with more vit k --> no changes, eating meals on wheels --> mix veg daily, no NVD    []   [x]       Change in alcohol use - has an occasional beer or bourbon like usual ---> continues with alcohol --> 1 or 2 beers per day --> no changes   []   [x]       Change in activity  []   [x]       Hospital admission  []   [x]       Emergency department visit  []   [x]       Other complaints  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 --> struggling with eyesight     Clinical Outcomes:  Yes     No  []   [x]       Major bleeding event  []   [x]       Thromboembolic event    Duration of warfarin Therapy: indefinite  INR Range:  2.0-3.0     RTC [facility] from [facility]     INR is 2.8 today   Continue weekly dose of 7.5 mg on Wed and 5 mg all other days of the week    Encouraged to maintain a consistency of vegetables/salads.    Recheck INR in 4 weeks, 12/27  Refill called to [facility] 11/29    Patient consent signed 10/28/21    Referring cardiologist is Dr. Loreta Mazariegos  PCP Dr. Makenna Kenny  INR (no units)   Date Value   10/28/2021 2.5   10/07/2021 2.70   09/27/2021 2.50   09/17/2021 2.1   09/03/2021 2.30   08/30/2021 1.80     For Pharmacy Admin Tracking Only     Intervention Detail: Refill(s) Provided   Total # of Interventions Recommended: 1   Total # of Interventions Accepted: 1   Time Spent (min): [address], PharmD    PGY1 Pharmacy Resident   [id number]

## 2021-11-29 NOTE — PROGRESS NOTES
Chief Complaint   Patient presents with    Hypertension    Hyperlipidemia    Diabetes     BS this morning was 118     HPI:   The patient is returning for chronic disease management. Type 2 diabetes mellitus: He continues to take glimepiride 1 mg daily. He denies any hypoglycemia. He reports his glucose generally runs in the low 100s. He tolerates treatment well. Hypertension: He is taking his medication as prescribed. He is not routinely monitoring his blood pressure. He is tolerating treatment well. Hyperlipidemia: He continues to take simvastatin nightly. He denies side effects. Chronic atrial fibrillation: He is taking Coumadin, metoprolol, and diltiazem as prescribed. He is tolerating treatment well. He was hospitalized in August for pneumonia. He also had urinary obstruction. His Flomax was increased to 0.8 mg daily. He was also started on finasteride 5 mg daily. He is currently urinating well.       Negative for chest pain  Negative for shortness of breath    Exam  /64   Pulse 88   Ht 5' 10\" (1.778 m)   Wt 223 lb 11 oz (101.5 kg)   BMI 32.10 kg/m²     General: Elderly gentleman not in distress  Cardiovascular: Irregular rhythm, normal rate, trace bilateral lower extremity edema  Respiratory: Effort is normal, breath sounds are clear bilaterally  GI: The abdomen is soft, nondistended, nontender to palpation      Lab Results   Component Value Date    CREATININE 0.6 (L) 08/19/2021    BUN 20 08/19/2021     08/19/2021    K 4.1 08/19/2021     08/19/2021    CO2 25 08/19/2021     Lab Results   Component Value Date    CHOL 91 12/10/2020    CHOL 96 06/03/2019    CHOL 111 05/30/2018     Lab Results   Component Value Date    TRIG 75 12/10/2020    TRIG 117 06/03/2019    TRIG 165 (H) 05/30/2018     Lab Results   Component Value Date    HDL 40 12/10/2020    HDL 38 (L) 06/03/2019    HDL 42 05/30/2018     Lab Results   Component Value Date    LDLCALC 36 12/10/2020    LDLCALC 35 06/03/2019    LDLCALC 36 05/30/2018     Lab Results   Component Value Date    LABVLDL 15 12/10/2020    LABVLDL 23 06/03/2019    LABVLDL 33 05/30/2018     No results found for: CHOLHDLRATIO     A/P  1. Type 2 diabetes mellitus without complication, without long-term current use of insulin (HCC)  Chronic and stable with excellent glycemic control and no evidence of hypoglycemia. Continue glimepiride.  - POCT glycosylated hemoglobin (Hb A1C)    2. Primary hypertension  Chronic and well controlled. Continue current medications. Blood work is up-to-date. 3. Mixed hyperlipidemia  Chronic and controlled. Continue simvastatin. 4. Permanent atrial fibrillation (HCC)  Chronic and stable. Continue rate control and anticoagulation. INR is monitored at anticoagulation clinic.     Return in 4 months

## 2021-11-29 NOTE — TELEPHONE ENCOUNTER
Warfarin prescription phoned into [facility] [address] to [address] [address] under Dr. Sinan Cole  Warfarin 5 mg tabs  Take 7.5 mg on Wednesdays and then take 5 mg all other days  90 days   2 refills    Dee Berman, PharmD    PGY1 Pharmacy Resident   [id number]

## 2021-12-07 ENCOUNTER — TELEPHONE (OUTPATIENT)
Dept: INTERNAL MEDICINE CLINIC | Age: 86
End: 2021-12-07

## 2021-12-07 RX ORDER — METOPROLOL SUCCINATE 100 MG/1
100 TABLET, EXTENDED RELEASE ORAL DAILY
Qty: 30 TABLET | Refills: 0 | Status: SHIPPED | OUTPATIENT
Start: 2021-12-07 | End: 2022-09-29

## 2021-12-07 RX ORDER — METOPROLOL SUCCINATE 100 MG/1
100 TABLET, EXTENDED RELEASE ORAL DAILY
Qty: 90 TABLET | Refills: 3 | Status: SHIPPED | OUTPATIENT
Start: 2021-12-07 | End: 2021-12-16

## 2021-12-07 NOTE — TELEPHONE ENCOUNTER
Patient's son, Chauncey Sirbrandt calling requesting refill of metoprolol succinate (TOPROL XL) 100 MG extended release tablet. Last written 01/18/21  Last OV 11/29/21  Next OV 03/22/22  Last recommended OV 03/29/22     Please send 30 day supply to [facility] on [address] and 90 day supply with refills to [facility].

## 2021-12-16 ENCOUNTER — OFFICE VISIT (OUTPATIENT)
Dept: CARDIOLOGY CLINIC | Age: 86
End: 2021-12-16
Payer: MEDICARE

## 2021-12-16 VITALS
SYSTOLIC BLOOD PRESSURE: 112 MMHG | DIASTOLIC BLOOD PRESSURE: 60 MMHG | HEART RATE: 137 BPM | HEIGHT: 70 IN | WEIGHT: 224 LBS | OXYGEN SATURATION: 99 % | BODY MASS INDEX: 32.07 KG/M2

## 2021-12-16 DIAGNOSIS — I48.21 PERMANENT ATRIAL FIBRILLATION (HCC): Primary | ICD-10-CM

## 2021-12-16 DIAGNOSIS — E78.2 MIXED HYPERLIPIDEMIA: ICD-10-CM

## 2021-12-16 DIAGNOSIS — I77.9 BILATERAL CAROTID ARTERY DISEASE, UNSPECIFIED TYPE (HCC): ICD-10-CM

## 2021-12-16 DIAGNOSIS — I25.10 CORONARY ARTERY DISEASE INVOLVING NATIVE CORONARY ARTERY OF NATIVE HEART WITHOUT ANGINA PECTORIS: ICD-10-CM

## 2021-12-16 DIAGNOSIS — I10 ESSENTIAL HYPERTENSION: ICD-10-CM

## 2021-12-16 DIAGNOSIS — E11.9 TYPE 2 DIABETES MELLITUS WITHOUT COMPLICATION, WITHOUT LONG-TERM CURRENT USE OF INSULIN (HCC): ICD-10-CM

## 2021-12-16 PROCEDURE — G8484 FLU IMMUNIZE NO ADMIN: HCPCS | Performed by: INTERNAL MEDICINE

## 2021-12-16 PROCEDURE — 4040F PNEUMOC VAC/ADMIN/RCVD: CPT | Performed by: INTERNAL MEDICINE

## 2021-12-16 PROCEDURE — G8427 DOCREV CUR MEDS BY ELIG CLIN: HCPCS | Performed by: INTERNAL MEDICINE

## 2021-12-16 PROCEDURE — 1123F ACP DISCUSS/DSCN MKR DOCD: CPT | Performed by: INTERNAL MEDICINE

## 2021-12-16 PROCEDURE — 93000 ELECTROCARDIOGRAM COMPLETE: CPT | Performed by: INTERNAL MEDICINE

## 2021-12-16 PROCEDURE — 99214 OFFICE O/P EST MOD 30 MIN: CPT | Performed by: INTERNAL MEDICINE

## 2021-12-16 PROCEDURE — G8417 CALC BMI ABV UP PARAM F/U: HCPCS | Performed by: INTERNAL MEDICINE

## 2021-12-16 PROCEDURE — 1036F TOBACCO NON-USER: CPT | Performed by: INTERNAL MEDICINE

## 2021-12-16 NOTE — PATIENT INSTRUCTIONS
Keep appt for Carotid dopplers   Cancel office visit with Dr Ronaldo Nair- Dr Joaquin Tian can follow results   Script for handicap card given   Monitor weight daily- Take Lasix with 5 lbs weight gain

## 2021-12-27 ENCOUNTER — TELEPHONE (OUTPATIENT)
Dept: PHARMACY | Age: 86
End: 2021-12-27

## 2021-12-27 ENCOUNTER — APPOINTMENT (OUTPATIENT)
Dept: PHARMACY | Age: 86
End: 2021-12-27
Payer: MEDICARE

## 2021-12-27 NOTE — TELEPHONE ENCOUNTER
Patient's relative left  this morning notifying us that he would not be able to make his appointment and would like to be rescheduled for Thursday. Patient's appointment has been rescheduled for 2:30 pm on Thursday 12/30.

## 2021-12-30 ENCOUNTER — ANTI-COAG VISIT (OUTPATIENT)
Dept: PHARMACY | Age: 86
End: 2021-12-30
Payer: MEDICARE

## 2021-12-30 DIAGNOSIS — I48.91 ATRIAL FIBRILLATION, UNSPECIFIED TYPE (HCC): Primary | ICD-10-CM

## 2021-12-30 LAB — INTERNATIONAL NORMALIZATION RATIO, POC: 2.7

## 2021-12-30 PROCEDURE — 85610 PROTHROMBIN TIME: CPT

## 2021-12-30 PROCEDURE — 99211 OFF/OP EST MAY X REQ PHY/QHP: CPT

## 2021-12-30 NOTE — PROGRESS NOTES
Mr. Sheron Anderson is a 80 y.o. y/o male with history of Afib   He presents today for anticoagulation monitoring and adjustment. Pertinent PMH: DM, CAD, HTN and Colon CA    Patient's wife  10/4/13. Patient Reported Findings:  Yes     No  [x]   []       Patient verifies current dosing regimen as listed   []   [x]       S/S bleeding/bruising/swelling/SOB - denies  []   [x]       Blood in urine or stool- denies   []   [x]       Procedures scheduled in the future at this time   []   [x]       Missed Dose - denies   []   [x]       Extra Dose - denies   []   [x]       Change in medications - takes tylenol as needed for knee aches---> occasional aleve 1 per day if needed ---> has been taking 4 aleve a day d/t knee pain acutely---> some aleve recently dt knee pain --> no changes  []   [x]       Change in health/diet/appetite-Has vegetables 1-2 x weekly. ---> states less greens - down to once weekly, no NVD-->greens not even once a week--> less greens lately, no other changes, no NVD--> had broccoli once in past week, states no NVD ---> no changes---> had more greens recently --> states that continues with more vit k --> no changes, eating meals on wheels --> mix veg daily, no NVD --> no changes    []   [x]       Change in alcohol use - has an occasional beer or bourbon like usual ---> continues with alcohol --> 1 or 2 beers per day --> no changes   []   [x]       Change in activity  []   [x]       Hospital admission  []   [x]       Emergency department visit  []   [x]       Other complaints  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 --> struggling with eyesight     Clinical Outcomes:  Yes     No  []   [x]       Major bleeding event  []   [x]       Thromboembolic event    Duration of warfarin Therapy: indefinite  INR Range:  2.0-3.0     INR is 2.7 today   Continue weekly dose of 7.5 mg on Wed and 5 mg all other days of the week    Encouraged to maintain a consistency of vegetables/salads. Recheck INR in 4 weeks, 1/27/22    Patient consent signed 10/28/21    Referring cardiologist is Dr. Sabas Mattson  PCP Dr. Pranay Hubbard  INR (no units)   Date Value   11/29/2021 2.8   10/28/2021 2.5   10/07/2021 2.70   09/27/2021 2.50   09/17/2021 2.1   09/03/2021 2.30   08/30/2021 1.80     For Pharmacy Admin Tracking Only     Total # of Interventions Recommended: 0   Total # of Interventions Accepted: 0   Time Spent (min): 15

## 2022-01-01 ENCOUNTER — APPOINTMENT (OUTPATIENT)
Dept: CT IMAGING | Age: 87
DRG: 871 | End: 2022-01-01
Payer: MEDICARE

## 2022-01-01 ENCOUNTER — HOSPITAL ENCOUNTER (OUTPATIENT)
Dept: CARDIAC REHAB | Age: 87
Discharge: HOME | End: 2022-11-01

## 2022-01-01 ENCOUNTER — HOSPITAL ENCOUNTER (INPATIENT)
Age: 87
LOS: 2 days | DRG: 871 | End: 2022-11-08
Attending: STUDENT IN AN ORGANIZED HEALTH CARE EDUCATION/TRAINING PROGRAM | Admitting: INTERNAL MEDICINE
Payer: MEDICARE

## 2022-01-01 ENCOUNTER — HOSPITAL ENCOUNTER (OUTPATIENT)
Dept: CARDIAC REHAB | Age: 87
Discharge: HOME | End: 2022-10-27

## 2022-01-01 ENCOUNTER — HOSPITAL ENCOUNTER (OUTPATIENT)
Dept: CARDIAC REHAB | Age: 87
Discharge: HOME | End: 2022-10-25

## 2022-01-01 ENCOUNTER — APPOINTMENT (OUTPATIENT)
Dept: GENERAL RADIOLOGY | Age: 87
DRG: 871 | End: 2022-01-01
Payer: MEDICARE

## 2022-01-01 ENCOUNTER — HOSPITAL ENCOUNTER (OUTPATIENT)
Dept: CARDIAC REHAB | Age: 87
Discharge: HOME | End: 2022-11-03

## 2022-01-01 VITALS
DIASTOLIC BLOOD PRESSURE: 68 MMHG | OXYGEN SATURATION: 88 % | BODY MASS INDEX: 32.93 KG/M2 | WEIGHT: 230 LBS | SYSTOLIC BLOOD PRESSURE: 123 MMHG | HEIGHT: 70 IN | HEART RATE: 87 BPM | TEMPERATURE: 97.3 F | RESPIRATION RATE: 23 BRPM

## 2022-01-01 DIAGNOSIS — D72.829 LEUKOCYTOSIS, UNSPECIFIED TYPE: Primary | ICD-10-CM

## 2022-01-01 DIAGNOSIS — E87.1 HYPONATREMIA: ICD-10-CM

## 2022-01-01 DIAGNOSIS — J18.9 PNEUMONIA DUE TO INFECTIOUS ORGANISM, UNSPECIFIED LATERALITY, UNSPECIFIED PART OF LUNG: ICD-10-CM

## 2022-01-01 DIAGNOSIS — K81.0 ACUTE CHOLECYSTITIS: ICD-10-CM

## 2022-01-01 LAB
A/G RATIO: 1.1 (ref 1.1–2.2)
ALBUMIN SERPL-MCNC: 3.8 G/DL (ref 3.4–5)
ALBUMIN SERPL-MCNC: 4 G/DL (ref 3.4–5)
ALP BLD-CCNC: 81 U/L (ref 40–129)
ALP BLD-CCNC: 81 U/L (ref 40–129)
ALT SERPL-CCNC: 11 U/L (ref 10–40)
ALT SERPL-CCNC: 13 U/L (ref 10–40)
ANION GAP SERPL CALCULATED.3IONS-SCNC: 10 MMOL/L (ref 3–16)
ANION GAP SERPL CALCULATED.3IONS-SCNC: 8 MMOL/L (ref 3–16)
APTT: 61.9 SEC (ref 23–34.3)
AST SERPL-CCNC: 17 U/L (ref 15–37)
AST SERPL-CCNC: 19 U/L (ref 15–37)
BACTERIA: ABNORMAL /HPF
BASOPHILS ABSOLUTE: 0 K/UL (ref 0–0.2)
BASOPHILS ABSOLUTE: 0.1 K/UL (ref 0–0.2)
BASOPHILS RELATIVE PERCENT: 0 %
BASOPHILS RELATIVE PERCENT: 0.5 %
BILIRUB SERPL-MCNC: 1.4 MG/DL (ref 0–1)
BILIRUB SERPL-MCNC: 1.7 MG/DL (ref 0–1)
BILIRUBIN DIRECT: 0.5 MG/DL (ref 0–0.3)
BILIRUBIN URINE: NEGATIVE
BILIRUBIN, INDIRECT: 0.9 MG/DL (ref 0–1)
BLOOD, URINE: ABNORMAL
BUN BLDV-MCNC: 13 MG/DL (ref 7–20)
BUN BLDV-MCNC: 13 MG/DL (ref 7–20)
CALCIUM SERPL-MCNC: 9.6 MG/DL (ref 8.3–10.6)
CALCIUM SERPL-MCNC: 9.6 MG/DL (ref 8.3–10.6)
CHLORIDE BLD-SCNC: 95 MMOL/L (ref 99–110)
CHLORIDE BLD-SCNC: 96 MMOL/L (ref 99–110)
CLARITY: CLEAR
CO2: 27 MMOL/L (ref 21–32)
CO2: 28 MMOL/L (ref 21–32)
COLOR: ABNORMAL
CREAT SERPL-MCNC: 0.6 MG/DL (ref 0.8–1.3)
CREAT SERPL-MCNC: 0.8 MG/DL (ref 0.8–1.3)
EKG ATRIAL RATE: 98 BPM
EKG DIAGNOSIS: NORMAL
EKG Q-T INTERVAL: 332 MS
EKG QRS DURATION: 76 MS
EKG QTC CALCULATION (BAZETT): 438 MS
EKG R AXIS: -12 DEGREES
EKG T AXIS: 11 DEGREES
EKG VENTRICULAR RATE: 105 BPM
EOSINOPHILS ABSOLUTE: 0 K/UL (ref 0–0.6)
EOSINOPHILS ABSOLUTE: 0 K/UL (ref 0–0.6)
EOSINOPHILS RELATIVE PERCENT: 0 %
EOSINOPHILS RELATIVE PERCENT: 0 %
EPITHELIAL CELLS, UA: 1 /HPF (ref 0–5)
ESTIMATED AVERAGE GLUCOSE: 145.6 MG/DL
GFR SERPL CREATININE-BSD FRML MDRD: >60 ML/MIN/{1.73_M2}
GFR SERPL CREATININE-BSD FRML MDRD: >60 ML/MIN/{1.73_M2}
GLUCOSE BLD-MCNC: 175 MG/DL (ref 70–99)
GLUCOSE BLD-MCNC: 177 MG/DL (ref 70–99)
GLUCOSE BLD-MCNC: 182 MG/DL (ref 70–99)
GLUCOSE BLD-MCNC: 183 MG/DL (ref 70–99)
GLUCOSE BLD-MCNC: 190 MG/DL (ref 70–99)
GLUCOSE BLD-MCNC: 230 MG/DL (ref 70–99)
GLUCOSE BLD-MCNC: 322 MG/DL (ref 70–99)
GLUCOSE URINE: >=1000 MG/DL
HBA1C MFR BLD: 6.7 %
HCT VFR BLD CALC: 37.3 % (ref 40.5–52.5)
HCT VFR BLD CALC: 38.7 % (ref 40.5–52.5)
HEMOGLOBIN: 12.3 G/DL (ref 13.5–17.5)
HEMOGLOBIN: 13.1 G/DL (ref 13.5–17.5)
HYALINE CASTS: 0 /LPF (ref 0–8)
INR BLD: 2.75 (ref 0.87–1.14)
INR BLD: 3.29 (ref 0.87–1.14)
KETONES, URINE: ABNORMAL MG/DL
LACTIC ACID, SEPSIS: 1.9 MMOL/L (ref 0.4–1.9)
LACTIC ACID: 0.8 MMOL/L (ref 0.4–2)
LEUKOCYTE ESTERASE, URINE: NEGATIVE
LIPASE: 20 U/L (ref 13–60)
LYMPHOCYTES ABSOLUTE: 0.3 K/UL (ref 1–5.1)
LYMPHOCYTES ABSOLUTE: 0.3 K/UL (ref 1–5.1)
LYMPHOCYTES RELATIVE PERCENT: 1.3 %
LYMPHOCYTES RELATIVE PERCENT: 1.6 %
MAGNESIUM: 1.8 MG/DL (ref 1.8–2.4)
MCH RBC QN AUTO: 31 PG (ref 26–34)
MCH RBC QN AUTO: 31.5 PG (ref 26–34)
MCHC RBC AUTO-ENTMCNC: 33.1 G/DL (ref 31–36)
MCHC RBC AUTO-ENTMCNC: 33.8 G/DL (ref 31–36)
MCV RBC AUTO: 93.4 FL (ref 80–100)
MCV RBC AUTO: 93.7 FL (ref 80–100)
MICROSCOPIC EXAMINATION: YES
MONOCYTES ABSOLUTE: 1 K/UL (ref 0–1.3)
MONOCYTES ABSOLUTE: 1.1 K/UL (ref 0–1.3)
MONOCYTES RELATIVE PERCENT: 5.1 %
MONOCYTES RELATIVE PERCENT: 6.4 %
NEUTROPHILS ABSOLUTE: 15.9 K/UL (ref 1.7–7.7)
NEUTROPHILS ABSOLUTE: 18.8 K/UL (ref 1.7–7.7)
NEUTROPHILS RELATIVE PERCENT: 92 %
NEUTROPHILS RELATIVE PERCENT: 93.1 %
NITRITE, URINE: NEGATIVE
PDW BLD-RTO: 14.8 % (ref 12.4–15.4)
PDW BLD-RTO: 15.3 % (ref 12.4–15.4)
PERFORMED ON: ABNORMAL
PH UA: 6 (ref 5–8)
PHOSPHORUS: 1.8 MG/DL (ref 2.5–4.9)
PLATELET # BLD: 170 K/UL (ref 135–450)
PLATELET # BLD: 207 K/UL (ref 135–450)
PMV BLD AUTO: 7.5 FL (ref 5–10.5)
PMV BLD AUTO: 8.2 FL (ref 5–10.5)
POTASSIUM REFLEX MAGNESIUM: 4.3 MMOL/L (ref 3.5–5.1)
POTASSIUM SERPL-SCNC: 3.7 MMOL/L (ref 3.5–5.1)
PREALBUMIN: 10.9 MG/DL (ref 20–40)
PRO-BNP: 1980 PG/ML (ref 0–449)
PROCALCITONIN: 0.34 NG/ML (ref 0–0.15)
PROTEIN UA: 30 MG/DL
PROTHROMBIN TIME: 29.2 SEC (ref 11.7–14.5)
PROTHROMBIN TIME: 33.7 SEC (ref 11.7–14.5)
RBC # BLD: 3.98 M/UL (ref 4.2–5.9)
RBC # BLD: 4.14 M/UL (ref 4.2–5.9)
RBC UA: 25 /HPF (ref 0–4)
SODIUM BLD-SCNC: 130 MMOL/L (ref 136–145)
SODIUM BLD-SCNC: 134 MMOL/L (ref 136–145)
SPECIFIC GRAVITY UA: 1.02 (ref 1–1.03)
TOTAL PROTEIN: 7.3 G/DL (ref 6.4–8.2)
TOTAL PROTEIN: 7.8 G/DL (ref 6.4–8.2)
TRANSFERRIN: 175 MG/DL (ref 200–360)
TROPONIN: <0.01 NG/ML
URINE REFLEX TO CULTURE: ABNORMAL
URINE TYPE: ABNORMAL
UROBILINOGEN, URINE: 1 E.U./DL
WBC # BLD: 17.3 K/UL (ref 4–11)
WBC # BLD: 20.2 K/UL (ref 4–11)
WBC UA: 1 /HPF (ref 0–5)

## 2022-01-01 PROCEDURE — 9900000065 HC CARDIAC REHAB PHASE 3 - 1 VISIT

## 2022-01-01 PROCEDURE — 2500000003 HC RX 250 WO HCPCS: Performed by: INTERNAL MEDICINE

## 2022-01-01 PROCEDURE — 2580000003 HC RX 258: Performed by: PHYSICIAN ASSISTANT

## 2022-01-01 PROCEDURE — 99285 EMERGENCY DEPT VISIT HI MDM: CPT

## 2022-01-01 PROCEDURE — 83880 ASSAY OF NATRIURETIC PEPTIDE: CPT

## 2022-01-01 PROCEDURE — APPSS15 APP SPLIT SHARED TIME 0-15 MINUTES: Performed by: NURSE PRACTITIONER

## 2022-01-01 PROCEDURE — 87040 BLOOD CULTURE FOR BACTERIA: CPT

## 2022-01-01 PROCEDURE — 85730 THROMBOPLASTIN TIME PARTIAL: CPT

## 2022-01-01 PROCEDURE — 6360000002 HC RX W HCPCS: Performed by: INTERNAL MEDICINE

## 2022-01-01 PROCEDURE — 2580000003 HC RX 258: Performed by: INTERNAL MEDICINE

## 2022-01-01 PROCEDURE — 96374 THER/PROPH/DIAG INJ IV PUSH: CPT

## 2022-01-01 PROCEDURE — 2060000000 HC ICU INTERMEDIATE R&B

## 2022-01-01 PROCEDURE — 85610 PROTHROMBIN TIME: CPT

## 2022-01-01 PROCEDURE — 85025 COMPLETE CBC W/AUTO DIFF WBC: CPT

## 2022-01-01 PROCEDURE — 96365 THER/PROPH/DIAG IV INF INIT: CPT

## 2022-01-01 PROCEDURE — 84134 ASSAY OF PREALBUMIN: CPT

## 2022-01-01 PROCEDURE — 94640 AIRWAY INHALATION TREATMENT: CPT

## 2022-01-01 PROCEDURE — 6360000002 HC RX W HCPCS: Performed by: CLINICAL NURSE SPECIALIST

## 2022-01-01 PROCEDURE — 96366 THER/PROPH/DIAG IV INF ADDON: CPT

## 2022-01-01 PROCEDURE — 80076 HEPATIC FUNCTION PANEL: CPT

## 2022-01-01 PROCEDURE — 92610 EVALUATE SWALLOWING FUNCTION: CPT

## 2022-01-01 PROCEDURE — 6370000000 HC RX 637 (ALT 250 FOR IP): Performed by: INTERNAL MEDICINE

## 2022-01-01 PROCEDURE — 74177 CT ABD & PELVIS W/CONTRAST: CPT

## 2022-01-01 PROCEDURE — 84484 ASSAY OF TROPONIN QUANT: CPT

## 2022-01-01 PROCEDURE — 83735 ASSAY OF MAGNESIUM: CPT

## 2022-01-01 PROCEDURE — 6370000000 HC RX 637 (ALT 250 FOR IP): Performed by: PHYSICIAN ASSISTANT

## 2022-01-01 PROCEDURE — 83690 ASSAY OF LIPASE: CPT

## 2022-01-01 PROCEDURE — 36415 COLL VENOUS BLD VENIPUNCTURE: CPT

## 2022-01-01 PROCEDURE — 6360000004 HC RX CONTRAST MEDICATION: Performed by: PHYSICIAN ASSISTANT

## 2022-01-01 PROCEDURE — 2500000003 HC RX 250 WO HCPCS: Performed by: NURSE PRACTITIONER

## 2022-01-01 PROCEDURE — 6360000002 HC RX W HCPCS: Performed by: STUDENT IN AN ORGANIZED HEALTH CARE EDUCATION/TRAINING PROGRAM

## 2022-01-01 PROCEDURE — APPNB30 APP NON BILLABLE TIME 0-30 MINS: Performed by: NURSE PRACTITIONER

## 2022-01-01 PROCEDURE — 2700000000 HC OXYGEN THERAPY PER DAY

## 2022-01-01 PROCEDURE — 99222 1ST HOSP IP/OBS MODERATE 55: CPT | Performed by: SURGERY

## 2022-01-01 PROCEDURE — 84100 ASSAY OF PHOSPHORUS: CPT

## 2022-01-01 PROCEDURE — 71045 X-RAY EXAM CHEST 1 VIEW: CPT

## 2022-01-01 PROCEDURE — 83036 HEMOGLOBIN GLYCOSYLATED A1C: CPT

## 2022-01-01 PROCEDURE — 99232 SBSQ HOSP IP/OBS MODERATE 35: CPT | Performed by: SURGERY

## 2022-01-01 PROCEDURE — 81001 URINALYSIS AUTO W/SCOPE: CPT

## 2022-01-01 PROCEDURE — 83605 ASSAY OF LACTIC ACID: CPT

## 2022-01-01 PROCEDURE — 92526 ORAL FUNCTION THERAPY: CPT

## 2022-01-01 PROCEDURE — 84145 PROCALCITONIN (PCT): CPT

## 2022-01-01 PROCEDURE — 80048 BASIC METABOLIC PNL TOTAL CA: CPT

## 2022-01-01 PROCEDURE — 6360000002 HC RX W HCPCS: Performed by: NURSE PRACTITIONER

## 2022-01-01 PROCEDURE — 80053 COMPREHEN METABOLIC PANEL: CPT

## 2022-01-01 PROCEDURE — 6370000000 HC RX 637 (ALT 250 FOR IP): Performed by: SURGERY

## 2022-01-01 PROCEDURE — 84466 ASSAY OF TRANSFERRIN: CPT

## 2022-01-01 PROCEDURE — 94761 N-INVAS EAR/PLS OXIMETRY MLT: CPT

## 2022-01-01 PROCEDURE — 93010 ELECTROCARDIOGRAM REPORT: CPT | Performed by: INTERNAL MEDICINE

## 2022-01-01 PROCEDURE — 6360000002 HC RX W HCPCS: Performed by: PHYSICIAN ASSISTANT

## 2022-01-01 PROCEDURE — 93005 ELECTROCARDIOGRAM TRACING: CPT | Performed by: STUDENT IN AN ORGANIZED HEALTH CARE EDUCATION/TRAINING PROGRAM

## 2022-01-01 RX ORDER — MORPHINE SULFATE 4 MG/ML
4 INJECTION, SOLUTION INTRAMUSCULAR; INTRAVENOUS
Status: DISCONTINUED | OUTPATIENT
Start: 2022-01-01 | End: 2022-01-01 | Stop reason: HOSPADM

## 2022-01-01 RX ORDER — DIAZEPAM 5 MG/ML
5 INJECTION, SOLUTION INTRAMUSCULAR; INTRAVENOUS EVERY 4 HOURS PRN
Status: DISCONTINUED | OUTPATIENT
Start: 2022-01-01 | End: 2022-01-01

## 2022-01-01 RX ORDER — METOPROLOL TARTRATE 5 MG/5ML
2.5 INJECTION INTRAVENOUS EVERY 6 HOURS
Status: DISCONTINUED | OUTPATIENT
Start: 2022-01-01 | End: 2022-01-01

## 2022-01-01 RX ORDER — POTASSIUM CHLORIDE 20 MEQ/1
40 TABLET, EXTENDED RELEASE ORAL PRN
Status: DISCONTINUED | OUTPATIENT
Start: 2022-01-01 | End: 2022-01-01

## 2022-01-01 RX ORDER — METOPROLOL SUCCINATE 50 MG/1
100 TABLET, EXTENDED RELEASE ORAL DAILY
Status: DISCONTINUED | OUTPATIENT
Start: 2022-01-01 | End: 2022-01-01

## 2022-01-01 RX ORDER — QUETIAPINE FUMARATE 25 MG/1
50 TABLET, FILM COATED ORAL ONCE
Status: DISCONTINUED | OUTPATIENT
Start: 2022-01-01 | End: 2022-01-01

## 2022-01-01 RX ORDER — ONDANSETRON 4 MG/1
4 TABLET, ORALLY DISINTEGRATING ORAL EVERY 8 HOURS PRN
Status: DISCONTINUED | OUTPATIENT
Start: 2022-01-01 | End: 2022-01-01 | Stop reason: HOSPADM

## 2022-01-01 RX ORDER — METOPROLOL TARTRATE 5 MG/5ML
5 INJECTION INTRAVENOUS ONCE
Status: DISCONTINUED | OUTPATIENT
Start: 2022-01-01 | End: 2022-01-01

## 2022-01-01 RX ORDER — SODIUM CHLORIDE 9 MG/ML
INJECTION, SOLUTION INTRAVENOUS CONTINUOUS
Status: DISCONTINUED | OUTPATIENT
Start: 2022-01-01 | End: 2022-01-01

## 2022-01-01 RX ORDER — ACETAMINOPHEN 650 MG/1
650 SUPPOSITORY RECTAL EVERY 6 HOURS PRN
Status: DISCONTINUED | OUTPATIENT
Start: 2022-01-01 | End: 2022-01-01 | Stop reason: HOSPADM

## 2022-01-01 RX ORDER — ACETAMINOPHEN 325 MG/1
650 TABLET ORAL EVERY 6 HOURS PRN
Status: DISCONTINUED | OUTPATIENT
Start: 2022-01-01 | End: 2022-01-01 | Stop reason: HOSPADM

## 2022-01-01 RX ORDER — IPRATROPIUM BROMIDE AND ALBUTEROL SULFATE 2.5; .5 MG/3ML; MG/3ML
1 SOLUTION RESPIRATORY (INHALATION)
Status: DISCONTINUED | OUTPATIENT
Start: 2022-01-01 | End: 2022-01-01

## 2022-01-01 RX ORDER — INSULIN GLARGINE 100 [IU]/ML
10 INJECTION, SOLUTION SUBCUTANEOUS NIGHTLY
Status: DISCONTINUED | OUTPATIENT
Start: 2022-01-01 | End: 2022-01-01

## 2022-01-01 RX ORDER — SODIUM CHLORIDE 0.9 % (FLUSH) 0.9 %
5-40 SYRINGE (ML) INJECTION PRN
Status: DISCONTINUED | OUTPATIENT
Start: 2022-01-01 | End: 2022-01-01 | Stop reason: HOSPADM

## 2022-01-01 RX ORDER — TAMSULOSIN HYDROCHLORIDE 0.4 MG/1
0.8 CAPSULE ORAL DAILY
Status: DISCONTINUED | OUTPATIENT
Start: 2022-01-01 | End: 2022-01-01

## 2022-01-01 RX ORDER — INSULIN LISPRO 100 [IU]/ML
0-4 INJECTION, SOLUTION INTRAVENOUS; SUBCUTANEOUS NIGHTLY
Status: DISCONTINUED | OUTPATIENT
Start: 2022-01-01 | End: 2022-01-01

## 2022-01-01 RX ORDER — MAGNESIUM SULFATE IN WATER 40 MG/ML
2000 INJECTION, SOLUTION INTRAVENOUS PRN
Status: DISCONTINUED | OUTPATIENT
Start: 2022-01-01 | End: 2022-01-01

## 2022-01-01 RX ORDER — 0.9 % SODIUM CHLORIDE 0.9 %
500 INTRAVENOUS SOLUTION INTRAVENOUS ONCE
Status: COMPLETED | OUTPATIENT
Start: 2022-01-01 | End: 2022-01-01

## 2022-01-01 RX ORDER — SODIUM CHLORIDE 0.9 % (FLUSH) 0.9 %
5-40 SYRINGE (ML) INJECTION EVERY 12 HOURS SCHEDULED
Status: DISCONTINUED | OUTPATIENT
Start: 2022-01-01 | End: 2022-01-01

## 2022-01-01 RX ORDER — BRIMONIDINE TARTRATE 2 MG/ML
1 SOLUTION/ DROPS OPHTHALMIC 2 TIMES DAILY
Status: DISCONTINUED | OUTPATIENT
Start: 2022-01-01 | End: 2022-01-01

## 2022-01-01 RX ORDER — OLANZAPINE 10 MG/1
5 INJECTION, POWDER, LYOPHILIZED, FOR SOLUTION INTRAMUSCULAR ONCE
Status: COMPLETED | OUTPATIENT
Start: 2022-01-01 | End: 2022-01-01

## 2022-01-01 RX ORDER — TIMOLOL MALEATE 5 MG/ML
1 SOLUTION/ DROPS OPHTHALMIC 2 TIMES DAILY
Status: DISCONTINUED | OUTPATIENT
Start: 2022-01-01 | End: 2022-01-01

## 2022-01-01 RX ORDER — DIAZEPAM 5 MG/ML
5 INJECTION, SOLUTION INTRAMUSCULAR; INTRAVENOUS EVERY 4 HOURS PRN
Status: DISCONTINUED | OUTPATIENT
Start: 2022-01-01 | End: 2022-01-01 | Stop reason: HOSPADM

## 2022-01-01 RX ORDER — DILTIAZEM HYDROCHLORIDE 120 MG/1
120 CAPSULE, COATED, EXTENDED RELEASE ORAL DAILY
Status: DISCONTINUED | OUTPATIENT
Start: 2022-01-01 | End: 2022-01-01

## 2022-01-01 RX ORDER — SODIUM CHLORIDE 9 MG/ML
INJECTION, SOLUTION INTRAVENOUS PRN
Status: DISCONTINUED | OUTPATIENT
Start: 2022-01-01 | End: 2022-01-01 | Stop reason: HOSPADM

## 2022-01-01 RX ORDER — DORZOLAMIDE HCL 20 MG/ML
1 SOLUTION/ DROPS OPHTHALMIC 2 TIMES DAILY
Status: DISCONTINUED | OUTPATIENT
Start: 2022-01-01 | End: 2022-01-01

## 2022-01-01 RX ORDER — FINASTERIDE 5 MG/1
5 TABLET, FILM COATED ORAL DAILY
Status: DISCONTINUED | OUTPATIENT
Start: 2022-01-01 | End: 2022-01-01

## 2022-01-01 RX ORDER — ENOXAPARIN SODIUM 100 MG/ML
30 INJECTION SUBCUTANEOUS 2 TIMES DAILY
Status: DISCONTINUED | OUTPATIENT
Start: 2022-01-01 | End: 2022-01-01

## 2022-01-01 RX ORDER — SCOLOPAMINE TRANSDERMAL SYSTEM 1 MG/1
1 PATCH, EXTENDED RELEASE TRANSDERMAL
Status: DISCONTINUED | OUTPATIENT
Start: 2022-01-01 | End: 2022-01-01 | Stop reason: HOSPADM

## 2022-01-01 RX ORDER — IPRATROPIUM BROMIDE AND ALBUTEROL SULFATE 2.5; .5 MG/3ML; MG/3ML
1 SOLUTION RESPIRATORY (INHALATION) ONCE
Status: COMPLETED | OUTPATIENT
Start: 2022-01-01 | End: 2022-01-01

## 2022-01-01 RX ORDER — POLYETHYLENE GLYCOL 3350 17 G/17G
17 POWDER, FOR SOLUTION ORAL DAILY
Status: DISCONTINUED | OUTPATIENT
Start: 2022-01-01 | End: 2022-01-01

## 2022-01-01 RX ORDER — DOCUSATE SODIUM 100 MG/1
100 CAPSULE, LIQUID FILLED ORAL 2 TIMES DAILY
Status: DISCONTINUED | OUTPATIENT
Start: 2022-01-01 | End: 2022-01-01

## 2022-01-01 RX ORDER — HYDRALAZINE HYDROCHLORIDE 20 MG/ML
10 INJECTION INTRAMUSCULAR; INTRAVENOUS EVERY 6 HOURS PRN
Status: DISCONTINUED | OUTPATIENT
Start: 2022-01-01 | End: 2022-01-01

## 2022-01-01 RX ORDER — INSULIN LISPRO 100 [IU]/ML
0-8 INJECTION, SOLUTION INTRAVENOUS; SUBCUTANEOUS
Status: DISCONTINUED | OUTPATIENT
Start: 2022-01-01 | End: 2022-01-01

## 2022-01-01 RX ORDER — 0.9 % SODIUM CHLORIDE 0.9 %
1000 INTRAVENOUS SOLUTION INTRAVENOUS ONCE
Status: DISCONTINUED | OUTPATIENT
Start: 2022-01-01 | End: 2022-01-01

## 2022-01-01 RX ORDER — MORPHINE SULFATE 2 MG/ML
2 INJECTION, SOLUTION INTRAMUSCULAR; INTRAVENOUS
Status: DISCONTINUED | OUTPATIENT
Start: 2022-01-01 | End: 2022-01-01 | Stop reason: HOSPADM

## 2022-01-01 RX ORDER — POTASSIUM CHLORIDE 7.45 MG/ML
10 INJECTION INTRAVENOUS PRN
Status: DISCONTINUED | OUTPATIENT
Start: 2022-01-01 | End: 2022-01-01

## 2022-01-01 RX ORDER — MORPHINE SULFATE 2 MG/ML
2 INJECTION, SOLUTION INTRAMUSCULAR; INTRAVENOUS
Status: DISCONTINUED | OUTPATIENT
Start: 2022-01-01 | End: 2022-01-01

## 2022-01-01 RX ORDER — BRIMONIDINE TARTRATE AND TIMOLOL MALEATE 2; 5 MG/ML; MG/ML
1 SOLUTION OPHTHALMIC EVERY 12 HOURS
Status: DISCONTINUED | OUTPATIENT
Start: 2022-01-01 | End: 2022-01-01

## 2022-01-01 RX ORDER — POLYETHYLENE GLYCOL 3350 17 G/17G
17 POWDER, FOR SOLUTION ORAL DAILY PRN
Status: DISCONTINUED | OUTPATIENT
Start: 2022-01-01 | End: 2022-01-01 | Stop reason: HOSPADM

## 2022-01-01 RX ORDER — MORPHINE SULFATE 4 MG/ML
4 INJECTION, SOLUTION INTRAMUSCULAR; INTRAVENOUS
Status: DISCONTINUED | OUTPATIENT
Start: 2022-01-01 | End: 2022-01-01

## 2022-01-01 RX ORDER — ONDANSETRON 2 MG/ML
4 INJECTION INTRAMUSCULAR; INTRAVENOUS EVERY 6 HOURS PRN
Status: DISCONTINUED | OUTPATIENT
Start: 2022-01-01 | End: 2022-01-01 | Stop reason: HOSPADM

## 2022-01-01 RX ORDER — DEXTROSE MONOHYDRATE 100 MG/ML
INJECTION, SOLUTION INTRAVENOUS CONTINUOUS PRN
Status: DISCONTINUED | OUTPATIENT
Start: 2022-01-01 | End: 2022-01-01

## 2022-01-01 RX ORDER — HALOPERIDOL 5 MG/ML
2 INJECTION INTRAMUSCULAR ONCE
Status: COMPLETED | OUTPATIENT
Start: 2022-01-01 | End: 2022-01-01

## 2022-01-01 RX ADMIN — ENOXAPARIN SODIUM 30 MG: 100 INJECTION SUBCUTANEOUS at 00:24

## 2022-01-01 RX ADMIN — ENOXAPARIN SODIUM 30 MG: 100 INJECTION SUBCUTANEOUS at 09:49

## 2022-01-01 RX ADMIN — ONDANSETRON 4 MG: 2 INJECTION INTRAMUSCULAR; INTRAVENOUS at 14:34

## 2022-01-01 RX ADMIN — TIMOLOL MALEATE 1 DROP: 5 SOLUTION OPHTHALMIC at 20:43

## 2022-01-01 RX ADMIN — Medication 10 ML: at 09:52

## 2022-01-01 RX ADMIN — HALOPERIDOL LACTATE 2 MG: 5 INJECTION, SOLUTION INTRAMUSCULAR at 23:52

## 2022-01-01 RX ADMIN — SODIUM CHLORIDE: 9 INJECTION, SOLUTION INTRAVENOUS at 21:08

## 2022-01-01 RX ADMIN — DORZOLAMIDE HYDROCHLORIDE 1 DROP: 20 SOLUTION/ DROPS OPHTHALMIC at 00:14

## 2022-01-01 RX ADMIN — DORZOLAMIDE HYDROCHLORIDE 1 DROP: 20 SOLUTION/ DROPS OPHTHALMIC at 09:51

## 2022-01-01 RX ADMIN — SODIUM CHLORIDE: 9 INJECTION, SOLUTION INTRAVENOUS at 20:51

## 2022-01-01 RX ADMIN — DORZOLAMIDE HYDROCHLORIDE 1 DROP: 20 SOLUTION/ DROPS OPHTHALMIC at 20:43

## 2022-01-01 RX ADMIN — PIPERACILLIN AND TAZOBACTAM 3375 MG: 3; .375 INJECTION, POWDER, FOR SOLUTION INTRAVENOUS at 04:57

## 2022-01-01 RX ADMIN — PIPERACILLIN AND TAZOBACTAM 3375 MG: 3; .375 INJECTION, POWDER, FOR SOLUTION INTRAVENOUS at 21:09

## 2022-01-01 RX ADMIN — TAMSULOSIN HYDROCHLORIDE 0.8 MG: 0.4 CAPSULE ORAL at 09:48

## 2022-01-01 RX ADMIN — DOCUSATE SODIUM 100 MG: 100 CAPSULE, LIQUID FILLED ORAL at 09:49

## 2022-01-01 RX ADMIN — SODIUM CHLORIDE, PRESERVATIVE FREE 10 ML: 5 INJECTION INTRAVENOUS at 04:53

## 2022-01-01 RX ADMIN — SODIUM PHOSPHATE, MONOBASIC, MONOHYDRATE 15 MMOL: 276; 142 INJECTION, SOLUTION INTRAVENOUS at 14:48

## 2022-01-01 RX ADMIN — METOPROLOL SUCCINATE 100 MG: 50 TABLET, EXTENDED RELEASE ORAL at 09:48

## 2022-01-01 RX ADMIN — OLANZAPINE 5 MG: 10 INJECTION, POWDER, LYOPHILIZED, FOR SOLUTION INTRAMUSCULAR at 02:50

## 2022-01-01 RX ADMIN — IPRATROPIUM BROMIDE AND ALBUTEROL SULFATE 1 AMPULE: .5; 3 SOLUTION RESPIRATORY (INHALATION) at 14:47

## 2022-01-01 RX ADMIN — FINASTERIDE 5 MG: 5 TABLET, FILM COATED ORAL at 09:49

## 2022-01-01 RX ADMIN — SODIUM CHLORIDE 500 ML: 9 INJECTION, SOLUTION INTRAVENOUS at 15:28

## 2022-01-01 RX ADMIN — POLYETHYLENE GLYCOL 3350 17 G: 17 POWDER, FOR SOLUTION ORAL at 09:48

## 2022-01-01 RX ADMIN — DILTIAZEM HYDROCHLORIDE 5 MG/HR: 5 INJECTION INTRAVENOUS at 17:35

## 2022-01-01 RX ADMIN — Medication 10 ML: at 00:19

## 2022-01-01 RX ADMIN — TIMOLOL MALEATE 1 DROP: 5 SOLUTION OPHTHALMIC at 09:51

## 2022-01-01 RX ADMIN — HYDRALAZINE HYDROCHLORIDE 10 MG: 20 INJECTION INTRAMUSCULAR; INTRAVENOUS at 05:51

## 2022-01-01 RX ADMIN — DOCUSATE SODIUM 100 MG: 100 CAPSULE, LIQUID FILLED ORAL at 21:21

## 2022-01-01 RX ADMIN — INSULIN GLARGINE 10 UNITS: 100 INJECTION, SOLUTION SUBCUTANEOUS at 21:20

## 2022-01-01 RX ADMIN — TIMOLOL MALEATE 1 DROP: 5 SOLUTION OPHTHALMIC at 00:14

## 2022-01-01 RX ADMIN — DILTIAZEM HYDROCHLORIDE 120 MG: 120 CAPSULE, COATED, EXTENDED RELEASE ORAL at 09:49

## 2022-01-01 RX ADMIN — SODIUM CHLORIDE, PRESERVATIVE FREE 10 ML: 5 INJECTION INTRAVENOUS at 09:07

## 2022-01-01 RX ADMIN — ONDANSETRON 4 MG: 2 INJECTION INTRAMUSCULAR; INTRAVENOUS at 04:53

## 2022-01-01 RX ADMIN — BRIMONIDINE TARTRATE 1 DROP: 2 SOLUTION OPHTHALMIC at 09:51

## 2022-01-01 RX ADMIN — ENOXAPARIN SODIUM 30 MG: 100 INJECTION SUBCUTANEOUS at 21:10

## 2022-01-01 RX ADMIN — POLYETHYLENE GLYCOL 3350 17 G: 17 POWDER, FOR SOLUTION ORAL at 23:16

## 2022-01-01 RX ADMIN — AZITHROMYCIN MONOHYDRATE 500 MG: 500 INJECTION, POWDER, LYOPHILIZED, FOR SOLUTION INTRAVENOUS at 15:43

## 2022-01-01 RX ADMIN — IOPAMIDOL 75 ML: 755 INJECTION, SOLUTION INTRAVENOUS at 16:30

## 2022-01-01 RX ADMIN — BRIMONIDINE TARTRATE 1 DROP: 2 SOLUTION OPHTHALMIC at 00:14

## 2022-01-01 RX ADMIN — MORPHINE SULFATE 4 MG: 4 INJECTION, SOLUTION INTRAMUSCULAR; INTRAVENOUS at 09:07

## 2022-01-01 RX ADMIN — IPRATROPIUM BROMIDE AND ALBUTEROL SULFATE 1 AMPULE: .5; 3 SOLUTION RESPIRATORY (INHALATION) at 22:23

## 2022-01-01 RX ADMIN — PIPERACILLIN AND TAZOBACTAM 3375 MG: 3; .375 INJECTION, POWDER, FOR SOLUTION INTRAVENOUS at 21:20

## 2022-01-01 RX ADMIN — BRIMONIDINE TARTRATE 1 DROP: 2 SOLUTION OPHTHALMIC at 20:42

## 2022-01-01 RX ADMIN — PIPERACILLIN AND TAZOBACTAM 3375 MG: 3; .375 INJECTION, POWDER, FOR SOLUTION INTRAVENOUS at 13:31

## 2022-01-01 ASSESSMENT — ENCOUNTER SYMPTOMS
CONSTIPATION: 0
SHORTNESS OF BREATH: 0
ABDOMINAL DISTENTION: 1
SORE THROAT: 0
NAUSEA: 1
ALLERGIC/IMMUNOLOGIC NEGATIVE: 1
COUGH: 0
DIARRHEA: 0
EYE PAIN: 0
EYES NEGATIVE: 1
VOMITING: 0
BACK PAIN: 0
TROUBLE SWALLOWING: 0
RHINORRHEA: 0
VOMITING: 1
ABDOMINAL PAIN: 1
ABDOMINAL PAIN: 0
NAUSEA: 0
SHORTNESS OF BREATH: 1

## 2022-01-01 ASSESSMENT — PAIN DESCRIPTION - LOCATION: LOCATION: THROAT

## 2022-01-01 ASSESSMENT — PAIN SCALES - GENERAL
PAINLEVEL_OUTOF10: 1
PAINLEVEL_OUTOF10: 4
PAINLEVEL_OUTOF10: 0

## 2022-01-01 ASSESSMENT — PAIN - FUNCTIONAL ASSESSMENT: PAIN_FUNCTIONAL_ASSESSMENT: 0-10

## 2022-01-27 ENCOUNTER — ANTI-COAG VISIT (OUTPATIENT)
Dept: PHARMACY | Age: 87
End: 2022-01-27
Payer: MEDICARE

## 2022-01-27 DIAGNOSIS — I48.91 ATRIAL FIBRILLATION, UNSPECIFIED TYPE (HCC): Primary | ICD-10-CM

## 2022-01-27 LAB — INTERNATIONAL NORMALIZATION RATIO, POC: 2.7

## 2022-01-27 PROCEDURE — 85610 PROTHROMBIN TIME: CPT

## 2022-01-27 PROCEDURE — 99211 OFF/OP EST MAY X REQ PHY/QHP: CPT

## 2022-01-27 NOTE — PROGRESS NOTES
Mr. Chente Rivera is a 80 y.o. y/o male with history of Afib   He presents today for anticoagulation monitoring and adjustment. Pertinent PMH: DM, CAD, HTN and Colon CA    Patient's wife  10/4/13. Patient Reported Findings:  Yes     No  [x]   []       Patient verifies current dosing regimen as listed   []   [x]       S/S bleeding/bruising/swelling/SOB - denies  []   [x]       Blood in urine or stool- denies   []   [x]       Procedures scheduled in the future at this time   []   [x]       Missed Dose - denies   []   [x]       Extra Dose - denies   []   [x]       Change in medications - takes tylenol as needed for knee aches---> occasional aleve 1 per day if needed ---> has been taking 4 aleve a day d/t knee pain acutely---> some aleve recently dt knee pain --> no changes  []   [x]       Change in health/diet/appetite-Has vegetables 1-2 x weekly.  ---> states less greens - down to once weekly, no NVD-->greens not even once a week--> less greens lately, no other changes, no NVD--> had broccoli once in past week, states no NVD ---> no changes---> had more greens recently --> states that continues with more vit k --> no changes, eating meals on wheels --> mix veg daily, no NVD --> no changes, no NVD   []   [x]       Change in alcohol use - has an occasional beer or bourbon like usual ---> continues with alcohol --> 1 or 2 beers per day --> no changes   []   [x]       Change in activity  []   [x]       Hospital admission  []   [x]       Emergency department visit  []   [x]       Other complaints  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 --> struggling with eyesight     Clinical Outcomes:  Yes     No  []   [x]       Major bleeding event  []   [x]       Thromboembolic event    Duration of warfarin Therapy: indefinite  INR Range:  2.0-3.0     INR is 2.7 today   Continue weekly dose of 7.5 mg on Wed and 5 mg all other days of the week    Encouraged to maintain a consistency of vegetables/salads.    Recheck INR in 4 weeks, 2/24/22    Patient consent signed 10/28/21    Referring cardiologist is Dr. Mele Cisse  PCP Dr. Sobia Degroot  INR (no units)   Date Value   01/27/2022 2.7   12/30/2021 2.7   11/29/2021 2.8   10/28/2021 2.5   10/07/2021 2.70   09/27/2021 2.50   09/03/2021 2.30   08/30/2021 1.80     For Pharmacy Admin Tracking Only     Total # of Interventions Recommended: 0   Total # of Interventions Accepted: 0   Time Spent (min): 15

## 2022-02-09 ENCOUNTER — OFFICE VISIT (OUTPATIENT)
Dept: VASCULAR SURGERY | Age: 87
End: 2022-02-09
Payer: MEDICARE

## 2022-02-09 ENCOUNTER — PROCEDURE VISIT (OUTPATIENT)
Dept: VASCULAR SURGERY | Age: 87
End: 2022-02-09
Payer: MEDICARE

## 2022-02-09 VITALS
BODY MASS INDEX: 31.92 KG/M2 | HEIGHT: 71 IN | SYSTOLIC BLOOD PRESSURE: 122 MMHG | DIASTOLIC BLOOD PRESSURE: 70 MMHG | WEIGHT: 228 LBS

## 2022-02-09 DIAGNOSIS — I65.23 CAROTID ATHEROSCLEROSIS, BILATERAL: ICD-10-CM

## 2022-02-09 DIAGNOSIS — I65.23 CAROTID ATHEROSCLEROSIS, BILATERAL: Primary | ICD-10-CM

## 2022-02-09 PROCEDURE — 1123F ACP DISCUSS/DSCN MKR DOCD: CPT | Performed by: SURGERY

## 2022-02-09 PROCEDURE — G8417 CALC BMI ABV UP PARAM F/U: HCPCS | Performed by: SURGERY

## 2022-02-09 PROCEDURE — 93880 EXTRACRANIAL BILAT STUDY: CPT | Performed by: SURGERY

## 2022-02-09 PROCEDURE — G8484 FLU IMMUNIZE NO ADMIN: HCPCS | Performed by: SURGERY

## 2022-02-09 PROCEDURE — G8427 DOCREV CUR MEDS BY ELIG CLIN: HCPCS | Performed by: SURGERY

## 2022-02-09 PROCEDURE — 1036F TOBACCO NON-USER: CPT | Performed by: SURGERY

## 2022-02-09 PROCEDURE — 99213 OFFICE O/P EST LOW 20 MIN: CPT | Performed by: SURGERY

## 2022-02-09 PROCEDURE — 4040F PNEUMOC VAC/ADMIN/RCVD: CPT | Performed by: SURGERY

## 2022-02-09 NOTE — PROGRESS NOTES
[facility]   Vascular Surgery Followup    Referring Provider:  Carmen Cueto MD     No chief complaint on file. History of Present Illness:  79-year-old male with history of coronary artery disease, atrial fibrillation, hypertension here today for follow-up of carotid atherosclerosis. He has a history significant for left carotid endarterectomy in 2014. He presents today for routine carotid duplex surveillance. He is doing well and has no complaints today    Past Medical History:   has a past medical history of Acute MI ([address] Arthritis, Atrial fibrillation ([address] CAD (coronary artery disease), Cancer of colon ([address] Glaucoma, Gout, unspecified, Hypertension, Hypertrophy of prostate without urinary obstruction and other lower urinary tract symptoms (LUTS), Other and unspecified hyperlipidemia, and Type II or unspecified type diabetes mellitus without mention of complication, uncontrolled. Surgical History:   has a past surgical history that includes Colon surgery; Cataract removal; Coronary angioplasty with stent; other surgical history; Revision total knee arthroplasty (Right, 08/17/2011); Abdomen surgery (1992); Colonoscopy; Carotid endarterectomy (Left, 2/24/13); Cystocopy (3-27-14); joint replacement (2010); Eye surgery (Left, 03/02/2018); Eye surgery (Left, 04/13/2018); and eye surgery (Left, 2009). Social History:   reports that he quit smoking about 45 years ago. He has a 30.00 pack-year smoking history. He has never used smokeless tobacco. He reports current alcohol use of about 10.0 standard drinks of alcohol per week. He reports that he does not use drugs. Family History:  family history includes Heart Disease in his mother; High Blood Pressure in his father and mother; Stroke in his mother.      Home Medications:  Current Outpatient Medications   Medication Sig Dispense Refill    metoprolol succinate (TOPROL XL) 100 MG extended release tablet Take 1 tablet by mouth daily 30 tablet 0    glimepiride (AMARYL) 1 MG tablet TAKE 1 TABLET EVERY DAY 90 tablet 1    tamsulosin (FLOMAX) 0.4 MG capsule Take 2 capsules by mouth daily 180 capsule 1    simvastatin (ZOCOR) 20 MG tablet Take 1 tablet by mouth nightly 90 tablet 1    finasteride (PROSCAR) 5 MG tablet       furosemide (LASIX) 20 MG tablet Take 1 tablet by mouth daily as needed (swelling) (Patient not taking: Reported on 12/16/2021) 30 tablet 0    dilTIAZem (CARDIZEM CD) 120 MG extended release capsule Take 1 capsule daily 90 capsule 3    ONETOUCH VERIO strip 1 each by In Vitro route 4 times daily Test 2-4 times a day E11.9 300 each 3    prednisoLONE acetate (PRED FORTE) 1 % ophthalmic suspension Place 1 drop into the left eye 3 times daily       Lancets Thin MISC 2-4 Devices by Does not apply route daily 2-4 leilani daily 300 each 3    bimatoprost (LUMIGAN) 0.01 % SOLN ophthalmic drops Place 1 drop into the right eye nightly       dorzolamide (TRUSOPT) 2 % ophthalmic solution Place 1 drop into the right eye 2 times daily       warfarin (COUMADIN) 5 MG tablet Take 7.5 mg (one and a half tablets) coumadin every evening until directed otherwise by Dr. Tanika Schumacher (Patient taking differently: Take 5 mg by mouth daily ) 90 tablet 3    brimonidine-timolol (COMBIGAN) 0.2-0.5 % ophthalmic solution Place 1 drop into the right eye every 12 hours        No current facility-administered medications for this visit. Allergies:  Hydrochlorothiazide, Metformin and related, and Morphine     Review of Systems:   · Constitutional: there has been no unanticipated weight loss. There's been no change in energy level, sleep pattern, or activity level. · Eyes: No visual changes or diplopia. No scleral icterus. · ENT: No Headaches, hearing loss or vertigo. No mouth sores or sore throat. · Cardiovascular: Reviewed in HPI  · Respiratory: No cough or wheezing, no sputum production. No hematemesis.     · Gastrointestinal: No abdominal pain, appetite ([facility]    BPH with urinary obstruction    Pancreatic cyst    Pleural plaque    Pulmonary fibrosis (HCC)    NSVT (nonsustained ventricular tachycardia) (HCC)    Nonrheumatic aortic valve insufficiency       Plan:  70-year-old male with stable mild carotid atherosclerosis. At this point given the stability and the minimal disease we will stop routine surveillance imaging. Patient and family comfortable with this plan. They will contact me should they need any further assistance    Thank you for allowing me to participate in the care of this individual.  Please do not hesitate to contact me with any questions. Elian Schmidt M.D., FACS.  2/9/2022  11:38 AM

## 2022-02-24 ENCOUNTER — ANTI-COAG VISIT (OUTPATIENT)
Dept: PHARMACY | Age: 87
End: 2022-02-24
Payer: MEDICARE

## 2022-02-24 DIAGNOSIS — I48.91 ATRIAL FIBRILLATION, UNSPECIFIED TYPE (HCC): Primary | ICD-10-CM

## 2022-02-24 LAB — INTERNATIONAL NORMALIZATION RATIO, POC: 2.8

## 2022-02-24 PROCEDURE — 99211 OFF/OP EST MAY X REQ PHY/QHP: CPT

## 2022-02-24 PROCEDURE — 85610 PROTHROMBIN TIME: CPT

## 2022-02-24 NOTE — PROGRESS NOTES
Mr. Park Landa is a 80 y.o. y/o male with history of Afib   He presents today for anticoagulation monitoring and adjustment. Pertinent PMH: DM, CAD, HTN and Colon CA    Patient's wife  10/4/13. Patient Reported Findings:  Yes     No  [x]   []       Patient verifies current dosing regimen as listed   []   [x]       S/S bleeding/bruising/swelling/SOB - denies  []   [x]       Blood in urine or stool- denies   []   [x]       Procedures scheduled in the future at this time   []   [x]       Missed Dose - denies   []   [x]       Extra Dose - denies   []   [x]       Change in medications - takes tylenol as needed for knee aches---> occasional aleve 1 per day if needed ---> has been taking 4 aleve a day d/t knee pain acutely---> some aleve recently dt knee pain --> no changes  []   [x]       Change in health/diet/appetite-Has vegetables 1-2 x weekly. 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 --> struggling with eyesight     Clinical Outcomes:  Yes     No  []   [x]       Major bleeding event  []   [x]       Thromboembolic event    Duration of warfarin Therapy: indefinite  INR Range:  2.0-3.0     INR is 2.8 today   Continue weekly dose of 7.5 mg on Wed and 5 mg all other days of the week    Encouraged to maintain a consistency of vegetables/salads.    Recheck INR in 4 weeks, 3/24    Patient consent signed 10/28/21    Referring cardiologist is Dr. Ebony Marie  PCP Dr. Carl Ku  INR (no units)   Date Value   01/27/2022 2.7   12/30/2021 2.7   11/29/2021 2.8   10/28/2021 2.5   10/07/2021 2.70   09/27/2021 2.50   09/03/2021 2.30   08/30/2021 1.80     For Pharmacy Admin Tracking Only     Total # of Interventions Recommended: 0   Total # of Interventions Accepted: 0   Time Spent (min): 15

## 2022-03-17 RX ORDER — TAMSULOSIN HYDROCHLORIDE 0.4 MG/1
0.8 CAPSULE ORAL DAILY
Qty: 180 CAPSULE | Refills: 1 | Status: SHIPPED | OUTPATIENT
Start: 2022-03-17 | End: 2022-08-11

## 2022-03-17 RX ORDER — SIMVASTATIN 20 MG
TABLET ORAL
Qty: 90 TABLET | Refills: 1 | Status: SHIPPED | OUTPATIENT
Start: 2022-03-17 | End: 2022-08-11

## 2022-03-22 ENCOUNTER — OFFICE VISIT (OUTPATIENT)
Dept: INTERNAL MEDICINE CLINIC | Age: 87
End: 2022-03-22
Payer: MEDICARE

## 2022-03-22 VITALS
HEART RATE: 100 BPM | HEIGHT: 71 IN | WEIGHT: 224 LBS | DIASTOLIC BLOOD PRESSURE: 68 MMHG | SYSTOLIC BLOOD PRESSURE: 122 MMHG | BODY MASS INDEX: 31.36 KG/M2

## 2022-03-22 DIAGNOSIS — E78.2 MIXED HYPERLIPIDEMIA: ICD-10-CM

## 2022-03-22 DIAGNOSIS — I48.21 PERMANENT ATRIAL FIBRILLATION (HCC): ICD-10-CM

## 2022-03-22 DIAGNOSIS — I10 PRIMARY HYPERTENSION: ICD-10-CM

## 2022-03-22 DIAGNOSIS — Z91.81 AT HIGH RISK FOR FALLS: ICD-10-CM

## 2022-03-22 DIAGNOSIS — I77.9 BILATERAL CAROTID ARTERY DISEASE, UNSPECIFIED TYPE (HCC): ICD-10-CM

## 2022-03-22 DIAGNOSIS — E11.9 TYPE 2 DIABETES MELLITUS WITHOUT COMPLICATION, WITHOUT LONG-TERM CURRENT USE OF INSULIN (HCC): Primary | ICD-10-CM

## 2022-03-22 PROBLEM — J84.10 PULMONARY FIBROSIS (HCC): Status: RESOLVED | Noted: 2021-08-19 | Resolved: 2022-01-01

## 2022-03-22 PROBLEM — F01.50 VASCULAR DEMENTIA (HCC): Status: RESOLVED | Noted: 2021-08-19 | Resolved: 2022-01-01

## 2022-03-22 LAB
CHOLESTEROL, TOTAL: 102 MG/DL (ref 0–199)
CHP ED QC CHECK: NORMAL
GLUCOSE BLD-MCNC: 232 MG/DL
HBA1C MFR BLD: 6.6 %
HDLC SERPL-MCNC: 38 MG/DL (ref 40–60)
LDL CHOLESTEROL CALCULATED: 39 MG/DL
TRIGL SERPL-MCNC: 126 MG/DL (ref 0–150)
VLDLC SERPL CALC-MCNC: 25 MG/DL

## 2022-03-22 PROCEDURE — 4040F PNEUMOC VAC/ADMIN/RCVD: CPT | Performed by: INTERNAL MEDICINE

## 2022-03-22 PROCEDURE — 3044F HG A1C LEVEL LT 7.0%: CPT | Performed by: INTERNAL MEDICINE

## 2022-03-22 PROCEDURE — 83036 HEMOGLOBIN GLYCOSYLATED A1C: CPT | Performed by: INTERNAL MEDICINE

## 2022-03-22 PROCEDURE — G8427 DOCREV CUR MEDS BY ELIG CLIN: HCPCS | Performed by: INTERNAL MEDICINE

## 2022-03-22 PROCEDURE — 99214 OFFICE O/P EST MOD 30 MIN: CPT | Performed by: INTERNAL MEDICINE

## 2022-03-22 PROCEDURE — 1036F TOBACCO NON-USER: CPT | Performed by: INTERNAL MEDICINE

## 2022-03-22 PROCEDURE — 1123F ACP DISCUSS/DSCN MKR DOCD: CPT | Performed by: INTERNAL MEDICINE

## 2022-03-22 PROCEDURE — G8484 FLU IMMUNIZE NO ADMIN: HCPCS | Performed by: INTERNAL MEDICINE

## 2022-03-22 PROCEDURE — 82962 GLUCOSE BLOOD TEST: CPT | Performed by: INTERNAL MEDICINE

## 2022-03-22 PROCEDURE — G8417 CALC BMI ABV UP PARAM F/U: HCPCS | Performed by: INTERNAL MEDICINE

## 2022-03-22 RX ORDER — BLOOD SUGAR DIAGNOSTIC
1 STRIP MISCELLANEOUS DAILY
Qty: 300 EACH | Refills: 3 | Status: SHIPPED | OUTPATIENT
Start: 2022-03-22 | End: 2022-04-25 | Stop reason: SDUPTHER

## 2022-03-22 NOTE — PROGRESS NOTES
Chief Complaint   Patient presents with    Diabetes     BS this morning was 138    Hypertension    Hyperlipidemia     HPI:   The patient is returning for chronic disease management. Type 2 diabetes mellitus: He continues to take glimepiride 1 mg daily. He denies recent hypoglycemia. He tolerates treatment well. Hypertension: He is taking his medication as prescribed. He is not routinely monitoring his blood pressure. He is tolerating treatment well. Hyperlipidemia: He continues to take simvastatin nightly. He denies side effects. Chronic atrial fibrillation: He is taking Coumadin, metoprolol, and diltiazem as prescribed. He is tolerating treatment well. Social History     Tobacco Use    Smoking status: Former Smoker     Packs/day: 1.00     Years: 30.00     Pack years: 30.00     Quit date: 1976     Years since quittin.6    Smokeless tobacco: Never Used   Vaping Use    Vaping Use: Never used   Substance Use Topics    Alcohol use:  Yes     Alcohol/week: 10.0 standard drinks     Types: 10 Cans of beer per week    Drug use: No           Negative for chest pain  Negative for shortness of breath    Exam  /68   Pulse 100   Ht 5' 10.5\" (1.791 m)   Wt 224 lb (101.6 kg)   BMI 31.69 kg/m²     General: Elderly gentleman not in distress  Cardiovascular: Irregular rhythm, normal rate  Respiratory: Effort is normal, breath sounds are clear bilaterally  GI: The abdomen is soft, nondistended, nontender to palpation      Lab Results   Component Value Date    CREATININE 0.6 (L) 2021    BUN 20 2021     2021    K 4.1 2021     2021    CO2 25 2021     Lab Results   Component Value Date    CHOL 91 12/10/2020    CHOL 96 2019    CHOL 111 2018     Lab Results   Component Value Date    TRIG 75 12/10/2020    TRIG 117 2019    TRIG 165 (H) 2018     Lab Results   Component Value Date    HDL 40 12/10/2020    HDL 38 (L) 2019 HDL 42 05/30/2018     Lab Results   Component Value Date    LDLCALC 36 12/10/2020    LDLCALC 35 06/03/2019    LDLCALC 36 05/30/2018     Lab Results   Component Value Date    LABVLDL 15 12/10/2020    LABVLDL 23 06/03/2019    LABVLDL 33 05/30/2018     No results found for: CHOLHDLRATIO     A/P  1. Type 2 diabetes mellitus without complication, without long-term current use of insulin (HCC)  Chronic and well controlled. The current medical regimen is effective;  continue present plan and medications. - blood glucose monitor kit and supplies; Dispense sufficient amount for indicated testing frequency plus additional to accommodate PRN testing needs. Dispense all needed supplies to include: monitor, strips, lancing device, lancets, control solutions, alcohol swabs. Dispense: 1 kit; Refill: 0  - POCT glycosylated hemoglobin (Hb A1C)  - POCT Glucose    2. Primary hypertension  Chronic and well controlled. The current medical regimen is effective;  continue present plan and medications. 3. Mixed hyperlipidemia  Chronic and well controlled. The current medical regimen is effective;  continue present plan and medications. - Lipid Panel    4. Permanent atrial fibrillation (HCC)  Chronic and stable  Continue rate control and anti coagulation    5. Bilateral carotid artery disease, unspecified type ([address]  Based on recent U/S showing <50% stenosis  Continue warfarin and risk factor modfication    6. At high risk for falls       On the basis of positive falls risk screening, assessment and plan is as follows: continue to use walker.

## 2022-03-24 ENCOUNTER — ANTI-COAG VISIT (OUTPATIENT)
Dept: PHARMACY | Age: 87
End: 2022-03-24
Payer: MEDICARE

## 2022-03-24 ENCOUNTER — TELEPHONE (OUTPATIENT)
Dept: INTERNAL MEDICINE CLINIC | Age: 87
End: 2022-03-24

## 2022-03-24 DIAGNOSIS — I48.91 ATRIAL FIBRILLATION, UNSPECIFIED TYPE (HCC): Primary | ICD-10-CM

## 2022-03-24 LAB — INTERNATIONAL NORMALIZATION RATIO, POC: 2.9

## 2022-03-24 PROCEDURE — 99211 OFF/OP EST MAY X REQ PHY/QHP: CPT

## 2022-03-24 PROCEDURE — 85610 PROTHROMBIN TIME: CPT

## 2022-03-24 NOTE — PROGRESS NOTES
Mr. Vadim Fitch is a 80 y.o. y/o male with history of Afib   He presents today for anticoagulation monitoring and adjustment. Pertinent PMH: DM, CAD, HTN and Colon CA    Patient's wife  10/4/13. Patient Reported Findings:  Yes     No  [x]   []       Patient verifies current dosing regimen as listed   []   [x]       S/S bleeding/bruising/swelling/SOB - denies  []   [x]       Blood in urine or stool- denies   []   [x]       Procedures scheduled in the future at this time   []   [x]       Missed Dose - Denies   []   [x]       Extra Dose - Denies   []   [x]       Change in medications - takes tylenol as needed for knee aches---> occasional aleve 1 per day if needed ---> has been taking 4 aleve a day d/t knee pain acutely---> some aleve recently dt knee pain --> no changes  []   [x]       Change in health/diet/appetite-Has vegetables 1-2 x weekly. ---> states less greens - down to once weekly, no NVD--> had broccoli once in past week, states no NVD ---> had more greens recently --> states that continues with more vit k --> no changes, eating meals on wheels --> mix veg daily, no NVD --> no changes, no NVD --> diarrhea once, no changes to diet  []   [x]       Change in alcohol use - has an occasional beer or bourbon like usual ---> continues with alcohol --> 1 or 2 beers per day --> no changes   []   [x]       Change in activity  []   [x]       Hospital admission  []   [x]       Emergency department visit  []   [x]       Other complaints  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 --> struggling with eyesight     Clinical Outcomes:  Yes     No  []   [x]       Major bleeding event  []   [x]       Thromboembolic event    Duration of warfarin Therapy: indefinite  INR Range:  2.0-3.0     INR is 2.9 today   Continue weekly dose of 7.5 mg on Wed and 5 mg all other days of the week    Encouraged to maintain a consistency of vegetables/salads. Refill called in to [facility].   Recheck INR in 4 weeks, 4/21    Patient consent signed 10/28/21    Referring cardiologist is Dr. Reina Robledo  PCP Dr. Mitch Silver  INR (no units)   Date Value   02/24/2022 2.8   01/27/2022 2.7   12/30/2021 2.7   11/29/2021 2.8   10/07/2021 2.70   09/27/2021 2.50   09/03/2021 2.30   08/30/2021 1.80   For Pharmacy Admin Tracking Only  Intervention Detail: Refill(s) Provided  Total # of Interventions Recommended: 1  Total # of Interventions Accepted: 1  Time Spent (min): 15

## 2022-03-24 NOTE — TELEPHONE ENCOUNTER
Called pt to advise there is not a glucometer to my knowledge with a magnifier on it.  Patient requests that we send a new one to his mail order pharmacy

## 2022-03-24 NOTE — TELEPHONE ENCOUNTER
----- Message from Sara Hughes sent at 3/24/2022 10:38 AM EDT -----  Subject: Message to Provider    QUESTIONS  Information for Provider? pt called about getting a new glucose machine. He stated that it isn't working properly and he stated that it is hard to   see what the machine says and to see if one comes with a magnifier. ---------------------------------------------------------------------------  --------------  Delmi DAWN  What is the best way for the office to contact you? OK to leave message on   voicemail  Preferred Call Back Phone Number? [phone]  ---------------------------------------------------------------------------  --------------  SCRIPT ANSWERS  Relationship to Patient?  Self

## 2022-04-04 RX ORDER — GLIMEPIRIDE 1 MG/1
TABLET ORAL
Qty: 90 TABLET | Refills: 1 | Status: SHIPPED | OUTPATIENT
Start: 2022-04-04 | End: 2022-07-18

## 2022-04-04 RX ORDER — DILTIAZEM HYDROCHLORIDE 120 MG/1
CAPSULE, COATED, EXTENDED RELEASE ORAL
Qty: 90 CAPSULE | Refills: 1 | Status: SHIPPED | OUTPATIENT
Start: 2022-04-04 | End: 2022-07-18

## 2022-04-21 ENCOUNTER — ANTI-COAG VISIT (OUTPATIENT)
Dept: PHARMACY | Age: 87
End: 2022-04-21
Payer: MEDICARE

## 2022-04-21 DIAGNOSIS — I48.91 ATRIAL FIBRILLATION, UNSPECIFIED TYPE (HCC): Primary | ICD-10-CM

## 2022-04-21 LAB — INTERNATIONAL NORMALIZATION RATIO, POC: 2.7

## 2022-04-21 PROCEDURE — 99211 OFF/OP EST MAY X REQ PHY/QHP: CPT

## 2022-04-21 PROCEDURE — 85610 PROTHROMBIN TIME: CPT

## 2022-04-21 NOTE — PROGRESS NOTES
Mr. Dali Cortés is a 80 y.o. y/o male with history of Afib   He presents today for anticoagulation monitoring and adjustment. Pertinent PMH: DM, CAD, HTN and Colon CA    Patient's wife  10/4/13. Patient Reported Findings:  Yes     No  [x]   []       Patient verifies current dosing regimen as listed   []   [x]       S/S bleeding/bruising/swelling/SOB - denies  []   [x]       Blood in urine or stool- denies   []   [x]       Procedures scheduled in the future at this time   []   [x]       Missed Dose - Denies   []   [x]       Extra Dose - Denies   []   [x]       Change in medications - takes tylenol as needed for knee aches---> occasional aleve 1 per day if needed ---> has been taking 4 aleve a day d/t knee pain acutely---> some aleve recently dt knee pain --> no changes  []   [x]       Change in health/diet/appetite-Has vegetables 1-2 x weekly. 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 --> struggling with eyesight     Clinical Outcomes:  Yes     No  []   [x]       Major bleeding event  []   [x]       Thromboembolic event    Duration of warfarin Therapy: indefinite  INR Range:  2.0-3.0     INR is 2.7 today   Continue weekly dose of 7.5 mg on Wed and 5 mg all other days of the week    Encouraged to maintain a consistency of vegetables/salads.    Recheck INR in 6 weeks,     Patient consent signed 10/28/21    Referring cardiologist is Dr. Sandhya Chamberlain  PCP Dr. Mitchell Skiff  INR (no units)   Date Value 04/21/2022 2.7   03/24/2022 2.9   02/24/2022 2.8   01/27/2022 2.7   10/07/2021 2.70   09/27/2021 2.50   09/03/2021 2.30   08/30/2021 1.80         For Pharmacy Admin Tracking Only     Total # of Interventions Recommended: 0   Total # of Interventions Accepted: 0   Time Spent (min): 15

## 2022-04-25 ENCOUNTER — TELEPHONE (OUTPATIENT)
Dept: INTERNAL MEDICINE CLINIC | Age: 87
End: 2022-04-25

## 2022-04-25 DIAGNOSIS — E11.9 TYPE 2 DIABETES MELLITUS WITHOUT COMPLICATION, WITHOUT LONG-TERM CURRENT USE OF INSULIN (HCC): ICD-10-CM

## 2022-04-25 RX ORDER — BLOOD SUGAR DIAGNOSTIC
1 STRIP MISCELLANEOUS DAILY
Qty: 300 EACH | Refills: 0 | Status: SHIPPED | OUTPATIENT
Start: 2022-04-25

## 2022-04-25 NOTE — TELEPHONE ENCOUNTER
Pt calling never received test strips from [facility] [employer] back in March---he wants new script sent to [facility] for his test strips---he is down to his last few----Thanks.

## 2022-06-02 ENCOUNTER — ANTI-COAG VISIT (OUTPATIENT)
Dept: PHARMACY | Age: 87
End: 2022-06-02
Payer: MEDICARE

## 2022-06-02 DIAGNOSIS — I48.91 ATRIAL FIBRILLATION, UNSPECIFIED TYPE (HCC): Primary | ICD-10-CM

## 2022-06-02 LAB — INTERNATIONAL NORMALIZATION RATIO, POC: 2.2

## 2022-06-02 PROCEDURE — 85610 PROTHROMBIN TIME: CPT

## 2022-06-02 PROCEDURE — 99211 OFF/OP EST MAY X REQ PHY/QHP: CPT

## 2022-06-02 NOTE — PROGRESS NOTES
Mr. Gissell Rosario is a 80 y.o. y/o male with history of Afib   He presents today for anticoagulation monitoring and adjustment. Pertinent PMH: DM, CAD, HTN and Colon CA    Patient's wife  10/4/13. Patient Reported Findings:  Yes     No  [x]   []       Patient verifies current dosing regimen as listed   []   [x]       S/S bleeding/bruising/swelling/SOB - denies  []   [x]       Blood in urine or stool- denies   []   [x]       Procedures scheduled in the future at this time   []   [x]       Missed Dose - Denies   []   [x]       Extra Dose - Denies   []   [x]       Change in medications - takes tylenol as needed for knee aches---> occasional aleve 1 per day if needed ---> has been taking 4 aleve a day d/t knee pain acutely---> some aleve recently dt knee pain --> no changes  []   [x]       Change in health/diet/appetite-Has vegetables 1-2 x weekly. 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 --> struggling with eyesight     Clinical Outcomes:  Yes     No  []   [x]       Major bleeding event  []   [x]       Thromboembolic event    Duration of warfarin Therapy: indefinite  INR Range:  2.0-3.0     INR is 2.7 today   Continue weekly dose of 7.5 mg on Wed and 5 mg all other days of the week    Encouraged to maintain a consistency of vegetables/salads.    Recheck INR in 6 weeks,     Patient consent signed 10/28/21    Referring cardiologist is Dr. Dante Duran  PCP Dr. Jerome Bryan  INR (no units)   Date Value 04/21/2022 2.7   03/24/2022 2.9   02/24/2022 2.8   01/27/2022 2.7   10/07/2021 2.70   09/27/2021 2.50   09/03/2021 2.30   08/30/2021 1.80         For Pharmacy Admin Tracking Only     Total # of Interventions Recommended: 0   Total # of Interventions Accepted: 0   Time Spent (min): 15

## 2022-06-13 NOTE — PROGRESS NOTES
[address]   Cardiac Follow up     Referring Provider:  Wendie Jimenez MD     Chief Complaint   Patient presents with    Coronary Artery Disease    Hyperlipidemia    Hypertension    6 Month Follow-Up      History of Present Illness:  Mr. Jeananne Hashimoto is a 80 y.o. male with a history of  CAD (JURGEN to LAD in 2005), hypertension, atrial fib, hyperlipidemia. He did have carotid surgery in Feb 2014. States he stopped smoking in the \"70's. Today he is here for routine six month follow up. He is at the visit with his son. He is doing well from the cardiac standpoint. He walks with a rollator and has not fallen recently. He denies exertional chest pain, shortness of breath, dizziness, palpitations, or edema. He denies bleeding or excessive bruising on warfarin. His weight has been stable and he has not needed to take prn furosemide since December 2021.       8/14/21-8/18/21  Hospital course:  81 yo M with history of CAD, Afib on Coumadin, DM2, BPH who came to ER with cough and SOB.  Admitted as inpatient for aspiration PNA, acute respiratory failure with hypoxia and Afib with RVR.  Noted to have drainage from legs with L leg cellulitis and aspiration PNA.  Started on IV Cefepime.  Followed by Cardiology.  COVID negative.  Required Monae for BPH with obstruction.  Increased Flomax 0.8 mg daily.  Void trial failed on 8/19 and will go home with Monae.  Can call [facility] office to make appt with Hemant Joshi (Urology NP) who can void trial in office next week and replace Monae if he fails.       Went into NSVT for 90 seconds on 8/17/21, Toprol XL increased to 100 mg daily.  Suspect patient came with BPH with obstruction, developed fluid overload and went into Afib with RVR.  He improved after Monae and IV Lasix.          Past Medical History:   has a past medical history of Acute MI ([address] Arthritis, Atrial fibrillation ([address] CAD (coronary artery disease), Cancer of colon ([address] Glaucoma, Gout, unspecified, Hypertension, Hypertrophy of prostate without urinary obstruction and other lower urinary tract symptoms (LUTS), Other and unspecified hyperlipidemia, and Type II or unspecified type diabetes mellitus without mention of complication, uncontrolled. Surgical History:   has a past surgical history that includes Colon surgery; Cataract removal; Coronary angioplasty with stent; other surgical history; Revision total knee arthroplasty (Right, 08/17/2011); Abdomen surgery (1992); Colonoscopy; Carotid endarterectomy (Left, 2/24/13); Cystocopy (3-27-14); joint replacement (2010); Eye surgery (Left, 03/02/2018); Eye surgery (Left, 04/13/2018); and eye surgery (Left, 2009). Social History:   reports that he quit smoking about 45 years ago. He has a 30.00 pack-year smoking history. He has never used smokeless tobacco. He reports current alcohol use of about 10.0 standard drinks of alcohol per week. He reports that he does not use drugs. Family History:  family history includes Heart Disease in his mother; High Blood Pressure in his father and mother; Stroke in his mother. Current Outpatient Medications   Medication Sig Dispense Refill    Multiple Vitamins-Minerals (DAILY MULTI VITAMIN/MINERALS PO) Take by mouth      ONETOUCH VERIO strip 1 each by In Vitro route daily Test 2-4 times a day E11.9 300 each 0    dilTIAZem (CARDIZEM CD) 120 MG extended release capsule TAKE 1 CAPSULE EVERY DAY 90 capsule 1    glimepiride (AMARYL) 1 MG tablet TAKE 1 TABLET EVERY DAY 90 tablet 1    blood glucose monitor kit and supplies Dispense sufficient amount for indicated testing frequency plus additional to accommodate PRN testing needs. Dispense all needed supplies to include: monitor, strips, lancing device, lancets, control solutions, alcohol swabs.  1 kit 0    simvastatin (ZOCOR) 20 MG tablet TAKE 1 TABLET EVERY NIGHT 90 tablet 1    tamsulosin (FLOMAX) 0.4 MG capsule TAKE 2 CAPSULES BY MOUTH DAILY 180 capsule 1    metoprolol succinate (TOPROL XL) 100 MG extended release tablet Take 1 tablet by mouth daily 30 tablet 0    finasteride (PROSCAR) 5 MG tablet       prednisoLONE acetate (PRED FORTE) 1 % ophthalmic suspension Place 1 drop into the left eye 3 times daily       Lancets Thin MISC 2-4 Devices by Does not apply route daily 2-4 leilani daily 300 each 3    bimatoprost (LUMIGAN) 0.01 % SOLN ophthalmic drops Place 1 drop into the right eye nightly       dorzolamide (TRUSOPT) 2 % ophthalmic solution Place 1 drop into the right eye 2 times daily       warfarin (COUMADIN) 5 MG tablet Take 7.5 mg (one and a half tablets) coumadin every evening until directed otherwise by Dr. Kamila David (Patient taking differently: Take 5 mg by mouth daily 5 mg everyday,  7.5 on Wed.) 90 tablet 3    brimonidine-timolol (COMBIGAN) 0.2-0.5 % ophthalmic solution Place 1 drop into the right eye every 12 hours        No current facility-administered medications for this visit. Allergies:  Hydrochlorothiazide, Metformin and related, and Morphine     Review of Systems:   · Constitutional: there has been no unanticipated weight loss. There's been no change in energy level, sleep pattern, or activity level. · Eyes: decreased vision-macular degeneration   · ENT: No Headaches, hearing loss or vertigo. No mouth sores or sore throat. · Cardiovascular: Reviewed in HPI  · Respiratory: No cough or wheezing, no sputum production. No hematemesis. · Gastrointestinal: No abdominal pain, appetite loss, blood in stools. No change in bowel or bladder habits. · Genitourinary: No dysuria, trouble voiding, or hematuria. · Musculoskeletal:  No gait disturbance, weakness. Right knee pain. · Integumentary: No rash or pruritis. · Neurological: No headache, diplopia, change in muscle strength, numbness or tingling. No change in gait, balance, coordination, mood, affect, memory, mentation, behavior.   · Psychiatric: No anxiety, no depression. · Endocrine: No malaise, fatigue or temperature intolerance. No excessive thirst, fluid intake, or urination. No tremor. · Hematologic/Lymphatic: No abnormal bruising or bleeding, blood clots or swollen lymph nodes. · Allergic/Immunologic: No nasal congestion or hives. Physical Examination:    Vitals:    06/17/22 0906   BP: 126/68   Pulse: 76   SpO2: 98%       Constitutional and General Appearance: NAD, well nourished   Skin:good turgor,intact without lesions  HEENT: EOMI ,normal  Neck:no JVD  Respiratory:  · Normal excursion and expansion without use of accessory muscles  · Resp Auscultation: Normal breath sounds without dullness  Cardiovascular:  · The apical impulses not displaced  · Heart tones are crisp and normal  · Cervical veins are not engorged  · The carotid upstroke is normal in amplitude and contour without delay or bruit  · Normal heart tone, irregular rhythm. · Peripheral pulses are symmetrical and full  · There is no clubbing, cyanosis of the extremities. · No edema  · Femoral Arteries: 2+ and equal  · Pedal Pulses: 2+ and equal   Abdomen:  · No masses or tenderness  · Liver/Spleen: No Abnormalities Noted  Neurological/Psychiatric:  · Alert and oriented in all spheres  · Moves all extremities well  · Exhibits normal gait balance and coordination  · No abnormalities of mood, affect, memory, mentation, or behavior are noted    Assessment/Plan:    1. Atrial fibrillation: Controlled rate. Irregular rhythm  Remains on Coumadin, Diltiazem  mg daily  INR managed by [facility] clinic. 2. CAD (coronary artery disease): Stable. No anginal symptoms  12/05 cath> JURGEN to LAD  6/12/10 Myoview GXT > small inferior partially reversible defect more c/w motion or diaphragm artifact than ischemia in distal RCA territory. 3. Hypertension:    Stable on current medications. Blood pressure 126/68, pulse 76, height 5' 10\" (1.778 m), weight 224 lb 3.2 oz (101.7 kg), SpO2 98 %.   Checked orthostatic BP with 6 mmHg drop with standing. Denies dizziness. Encouraged him to change positions slowly    4. Other and unspecified hyperlipidemia:     Checked by PCP -  3/22/22 - TC- 102. TG- 126, HDL- 26, LDL- 39   Controlled - Zocor 20mg    5. Carotid artery disease: s/p left CEA 2/2014.     12/18/15 Dopplers> 1-15% ja. 2/9/22 carotid duplex > MONIKA < 50% stenosis, right vertebral not well visualized and likely representing vessel occlusion. LICA < 63% stenosis, left vertebral artery with normal antegrade flow    Currently on Zocor. 6.  DM w/o complication type II: Followed by PCP.           3/22/22 - Hgb A1C - 6.6    7. Aortic insufficiency         8/16/21 Echo - moderate eccentric aortic regurgitation, mild aortic calcification without evidence of aortic stenosis       Plan:   Merry Hinton has a stable cardiac status. Cardiac test and lab results personally reviewed by me during this office visit and discussed. Monitor weight daily- Take Lasix with 5 lbs weight gain or increased swelling  No med changes. Continue risk factor modifications. Call for any change in symptoms including dizziness with standing, shortness of breath, chest discomfort. Call our office if SBP is below 100 mmHg with standing. I would recommend a 7 day cardiac monitor before adjusting any medication. Return for regular follow up in 6 months        I appreciate the opportunity of cooperating in the care of this individual.    Melinda Layne. Yoel Hess M.D., [facility] [place]    Patient's problem list, medications, allergies, past medical, surgical, social and family histories were reviewed and updated as appropriate. Scribe's attestation: This note was scribed in the presence of Reynold Libman E. [place] Cobb, M.D. by Luciano Gongora RN    The scribe's documentation has been prepared under my direction and personally reviewed by me in its entirety.  I confirm that the note above accurately reflects all work, treatment, procedures, and medical

## 2022-06-17 ENCOUNTER — OFFICE VISIT (OUTPATIENT)
Dept: CARDIOLOGY CLINIC | Age: 87
End: 2022-06-17
Payer: MEDICARE

## 2022-06-17 VITALS
HEIGHT: 70 IN | DIASTOLIC BLOOD PRESSURE: 68 MMHG | HEART RATE: 76 BPM | WEIGHT: 224.2 LBS | BODY MASS INDEX: 32.1 KG/M2 | OXYGEN SATURATION: 98 % | SYSTOLIC BLOOD PRESSURE: 114 MMHG

## 2022-06-17 DIAGNOSIS — I10 ESSENTIAL HYPERTENSION: ICD-10-CM

## 2022-06-17 DIAGNOSIS — E11.9 TYPE 2 DIABETES MELLITUS WITHOUT COMPLICATION, WITHOUT LONG-TERM CURRENT USE OF INSULIN (HCC): ICD-10-CM

## 2022-06-17 DIAGNOSIS — E78.2 MIXED HYPERLIPIDEMIA: ICD-10-CM

## 2022-06-17 DIAGNOSIS — I25.10 CORONARY ARTERY DISEASE INVOLVING NATIVE CORONARY ARTERY OF NATIVE HEART WITHOUT ANGINA PECTORIS: ICD-10-CM

## 2022-06-17 DIAGNOSIS — I48.21 PERMANENT ATRIAL FIBRILLATION (HCC): Primary | ICD-10-CM

## 2022-06-17 DIAGNOSIS — I77.9 BILATERAL CAROTID ARTERY DISEASE, UNSPECIFIED TYPE (HCC): ICD-10-CM

## 2022-06-17 PROCEDURE — 3044F HG A1C LEVEL LT 7.0%: CPT | Performed by: INTERNAL MEDICINE

## 2022-06-17 PROCEDURE — 1123F ACP DISCUSS/DSCN MKR DOCD: CPT | Performed by: INTERNAL MEDICINE

## 2022-06-17 PROCEDURE — G8417 CALC BMI ABV UP PARAM F/U: HCPCS | Performed by: INTERNAL MEDICINE

## 2022-06-17 PROCEDURE — G8427 DOCREV CUR MEDS BY ELIG CLIN: HCPCS | Performed by: INTERNAL MEDICINE

## 2022-06-17 PROCEDURE — 1036F TOBACCO NON-USER: CPT | Performed by: INTERNAL MEDICINE

## 2022-06-17 PROCEDURE — 99214 OFFICE O/P EST MOD 30 MIN: CPT | Performed by: INTERNAL MEDICINE

## 2022-06-17 NOTE — PATIENT INSTRUCTIONS
Monitor weight daily- Take Lasix with 5 lbs weight gain or increased swelling  No med changes. Continue risk factor modifications. Call for any change in symptoms including dizziness with standing, shortness of breath, chest discomfort. Call our office if SBP is below 100 mmHg with standing. If blood pressure is running low (systolic BP (top #) below 100 mmHg, would recommend 7 day cardiac monitor before adjusting any medication.     Return for regular follow up in 6 months

## 2022-07-14 ENCOUNTER — ANTI-COAG VISIT (OUTPATIENT)
Dept: PHARMACY | Age: 87
End: 2022-07-14
Payer: MEDICARE

## 2022-07-14 DIAGNOSIS — I48.91 ATRIAL FIBRILLATION, UNSPECIFIED TYPE (HCC): Primary | ICD-10-CM

## 2022-07-14 LAB — INTERNATIONAL NORMALIZATION RATIO, POC: 2.3

## 2022-07-14 PROCEDURE — 99211 OFF/OP EST MAY X REQ PHY/QHP: CPT

## 2022-07-14 PROCEDURE — 85610 PROTHROMBIN TIME: CPT

## 2022-07-14 NOTE — PROGRESS NOTES
Mr. Joseph Gandhi is a 80 y.o. y/o male with history of Afib   He presents today for anticoagulation monitoring and adjustment. Pertinent PMH: DM, CAD, HTN and Colon CA    Patient's wife  10/4/13. Patient Reported Findings:  Yes     No  [x]   []       Patient verifies current dosing regimen as listed   []   [x]       S/S bleeding/bruising/swelling/SOB - denies  []   [x]       Blood in urine or stool- denies   []   [x]       Procedures scheduled in the future at this time   []   [x]       Missed Dose - Denies   []   [x]       Extra Dose - Denies   []   [x]       Change in medications - takes tylenol as needed for knee aches---> occasional aleve 1 per day if needed ---> has been taking 4 aleve a day d/t knee pain acutely---> some aleve recently dt knee pain --> no changes  []   [x]       Change in health/diet/appetite-Has vegetables 1-2 x weekly. 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 --> struggling with eyesight     Clinical Outcomes:  Yes     No  []   [x]       Major bleeding event  []   [x]       Thromboembolic event    Duration of warfarin Therapy: indefinite  INR Range:  2.0-3.0     INR is 2.3 today   Continue weekly dose of 7.5 mg on Wed and 5 mg all other days of the week. Encouraged to maintain a consistency of vegetables/salads.    Recheck INR in 6 weeks,     Patient consent signed 10/28/21    Referring cardiologist is Dr. Dayan Mendiola  PCP Dr. Christine Lin  INR (no units)   Date Value 10/07/2021 2.70   09/27/2021 2.50   09/03/2021 2.30   08/30/2021 1.80     INR,(POC) (no units)   Date Value   07/14/2022 2.3   06/02/2022 2.2   04/21/2022 2.7   03/24/2022 2.9         For Pharmacy Admin Tracking Only     Total # of Interventions Recommended: 0   Total # of Interventions Accepted: 0   Time Spent (min): 15

## 2022-07-18 RX ORDER — GLIMEPIRIDE 1 MG/1
TABLET ORAL
Qty: 90 TABLET | Refills: 1 | Status: SHIPPED | OUTPATIENT
Start: 2022-07-18

## 2022-07-18 RX ORDER — DILTIAZEM HYDROCHLORIDE 120 MG/1
CAPSULE, COATED, EXTENDED RELEASE ORAL
Qty: 90 CAPSULE | Refills: 1 | Status: SHIPPED | OUTPATIENT
Start: 2022-07-18

## 2022-08-11 RX ORDER — SIMVASTATIN 20 MG
TABLET ORAL
Qty: 90 TABLET | Refills: 1 | Status: SHIPPED | OUTPATIENT
Start: 2022-08-11

## 2022-08-11 RX ORDER — TAMSULOSIN HYDROCHLORIDE 0.4 MG/1
CAPSULE ORAL
Qty: 180 CAPSULE | Refills: 1 | Status: SHIPPED | OUTPATIENT
Start: 2022-08-11

## 2022-08-11 NOTE — TELEPHONE ENCOUNTER
Medication:   Requested Prescriptions     Pending Prescriptions Disp Refills    simvastatin (ZOCOR) 20 MG tablet [Pharmacy Med Name: SIMVASTATIN 20 MG Tablet] 90 tablet 1     Sig: TAKE 1 TABLET EVERY NIGHT    tamsulosin (FLOMAX) 0.4 MG capsule [Pharmacy Med Name: TAMSULOSIN HYDROCHLORIDE 0.4 MG Capsule] 180 capsule 1     Sig: TAKE 2 CAPSULES DAILY       Last Filled:  3/17/22    Patient Phone Number: [phone] (home)     Last appt: 3/22/2022   Next appt: 8/15/2022

## 2022-08-15 ENCOUNTER — OFFICE VISIT (OUTPATIENT)
Dept: INTERNAL MEDICINE CLINIC | Age: 87
End: 2022-08-15
Payer: MEDICARE

## 2022-08-15 VITALS
WEIGHT: 227 LBS | SYSTOLIC BLOOD PRESSURE: 126 MMHG | BODY MASS INDEX: 32.5 KG/M2 | HEART RATE: 84 BPM | HEIGHT: 70 IN | DIASTOLIC BLOOD PRESSURE: 72 MMHG

## 2022-08-15 DIAGNOSIS — E11.9 TYPE 2 DIABETES MELLITUS WITHOUT COMPLICATION, WITHOUT LONG-TERM CURRENT USE OF INSULIN (HCC): ICD-10-CM

## 2022-08-15 DIAGNOSIS — Z00.00 MEDICARE ANNUAL WELLNESS VISIT, SUBSEQUENT: Primary | ICD-10-CM

## 2022-08-15 LAB
ALBUMIN SERPL-MCNC: 4.3 G/DL (ref 3.4–5)
ANION GAP SERPL CALCULATED.3IONS-SCNC: 9 MMOL/L (ref 3–16)
BUN BLDV-MCNC: 15 MG/DL (ref 7–20)
CALCIUM SERPL-MCNC: 9.2 MG/DL (ref 8.3–10.6)
CHLORIDE BLD-SCNC: 101 MMOL/L (ref 99–110)
CO2: 28 MMOL/L (ref 21–32)
CREAT SERPL-MCNC: 0.8 MG/DL (ref 0.8–1.3)
GFR AFRICAN AMERICAN: >60
GFR NON-AFRICAN AMERICAN: >60
GLUCOSE BLD-MCNC: 216 MG/DL (ref 70–99)
HBA1C MFR BLD: 7 %
PHOSPHORUS: 2.7 MG/DL (ref 2.5–4.9)
POTASSIUM SERPL-SCNC: 4.2 MMOL/L (ref 3.5–5.1)
SODIUM BLD-SCNC: 138 MMOL/L (ref 136–145)

## 2022-08-15 PROCEDURE — 1123F ACP DISCUSS/DSCN MKR DOCD: CPT | Performed by: INTERNAL MEDICINE

## 2022-08-15 PROCEDURE — 83036 HEMOGLOBIN GLYCOSYLATED A1C: CPT | Performed by: INTERNAL MEDICINE

## 2022-08-15 PROCEDURE — G0439 PPPS, SUBSEQ VISIT: HCPCS | Performed by: INTERNAL MEDICINE

## 2022-08-15 PROCEDURE — 3044F HG A1C LEVEL LT 7.0%: CPT | Performed by: INTERNAL MEDICINE

## 2022-08-15 SDOH — ECONOMIC STABILITY: FOOD INSECURITY: WITHIN THE PAST 12 MONTHS, THE FOOD YOU BOUGHT JUST DIDN'T LAST AND YOU DIDN'T HAVE MONEY TO GET MORE.: NEVER TRUE

## 2022-08-15 SDOH — ECONOMIC STABILITY: FOOD INSECURITY: WITHIN THE PAST 12 MONTHS, YOU WORRIED THAT YOUR FOOD WOULD RUN OUT BEFORE YOU GOT MONEY TO BUY MORE.: NEVER TRUE

## 2022-08-15 ASSESSMENT — PATIENT HEALTH QUESTIONNAIRE - PHQ9
SUM OF ALL RESPONSES TO PHQ QUESTIONS 1-9: 0
1. LITTLE INTEREST OR PLEASURE IN DOING THINGS: 0
SUM OF ALL RESPONSES TO PHQ9 QUESTIONS 1 & 2: 0
SUM OF ALL RESPONSES TO PHQ QUESTIONS 1-9: 0
2. FEELING DOWN, DEPRESSED OR HOPELESS: 0
SUM OF ALL RESPONSES TO PHQ QUESTIONS 1-9: 0
SUM OF ALL RESPONSES TO PHQ QUESTIONS 1-9: 0

## 2022-08-15 ASSESSMENT — LIFESTYLE VARIABLES
HOW MANY STANDARD DRINKS CONTAINING ALCOHOL DO YOU HAVE ON A TYPICAL DAY: 1 OR 2
HOW OFTEN DO YOU HAVE A DRINK CONTAINING ALCOHOL: 2-3 TIMES A WEEK

## 2022-08-15 ASSESSMENT — SOCIAL DETERMINANTS OF HEALTH (SDOH): HOW HARD IS IT FOR YOU TO PAY FOR THE VERY BASICS LIKE FOOD, HOUSING, MEDICAL CARE, AND HEATING?: NOT HARD AT ALL

## 2022-08-15 NOTE — PATIENT INSTRUCTIONS
Personalized Preventive Plan for Jacobo Luna - 8/15/2022  Medicare offers a range of preventive health benefits. Some of the tests and screenings are paid in full while other may be subject to a deductible, co-insurance, and/or copay. Some of these benefits include a comprehensive review of your medical history including lifestyle, illnesses that may run in your family, and various assessments and screenings as appropriate. After reviewing your medical record and screening and assessments performed today your provider may have ordered immunizations, labs, imaging, and/or referrals for you. A list of these orders (if applicable) as well as your Preventive Care list are included within your After Visit Summary for your review. Other Preventive Recommendations:    A preventive eye exam performed by an eye specialist is recommended every 1-2 years to screen for glaucoma; cataracts, macular degeneration, and other eye disorders. A preventive dental visit is recommended every 6 months. Try to get at least 150 minutes of exercise per week or 10,000 steps per day on a pedometer . Order or download the FREE \"Exercise & Physical Activity: Your Everyday Guide\" from [employer]. Call 6-769.354.2216 or search [employer] online. You need 8390-5861 mg of calcium and 2101-5687 IU of vitamin D per day. It is possible to meet your calcium requirement with diet alone, but a vitamin D supplement is usually necessary to meet this goal.  When exposed to the sun, use a sunscreen that protects against both UVA and UVB radiation with an SPF of 30 or greater. Reapply every 2 to 3 hours or after sweating, drying off with a towel, or swimming. Always wear a seat belt when traveling in a car. Always wear a helmet when riding a bicycle or motorcycle.

## 2022-08-15 NOTE — PROGRESS NOTES
others to perform any of the following everyday activities: Eating, dressing, grooming, bathing, toileting, or walking/balance?: No  In the past 7 days, did you need help from others to take care of any of the following: Laundry, housekeeping, banking/finances, shopping, telephone use, food preparation, transportation, or taking medications?: (!) Yes  Select all that apply: (!) Transportation  ADL Interventions:  Family meets his needs          Objective   Vitals:    08/15/22 0940   BP: 126/72   Pulse: 84   Weight: 227 lb (103 kg)   Height: 5' 10\" (1.778 m)      Body mass index is 32.57 kg/m². GEN: not in distress  CV: Irregular rhythm, normal rate  No murmurs  RESP: CTAB      Allergies   Allergen Reactions    Hydrochlorothiazide Other (See Comments)     Gout. Metformin And Related Diarrhea    Morphine      PCA, caused confusion     Prior to Visit Medications    Medication Sig Taking? Authorizing Provider   simvastatin (ZOCOR) 20 MG tablet TAKE 1 TABLET EVERY NIGHT Yes SULEMAN Kunz CNP   tamsulosin (FLOMAX) 0.4 MG capsule TAKE 2 CAPSULES DAILY Yes SULEMAN Kunz CNP   glimepiride (AMARYL) 1 MG tablet TAKE 1 TABLET EVERY DAY Yes Quincy Gonzalez MD   dilTIAZem (CARDIZEM CD) 120 MG extended release capsule TAKE [address] Yes Quincy Gonzalez MD   Multiple Vitamins-Minerals (DAILY MULTI VITAMIN/MINERALS PO) Take by mouth Yes Historical Provider, MD [place] [address] strip 1 each by In Vitro route daily Test 2-4 times a day E11.9 Yes Quincy Gonzalez MD   blood glucose monitor kit and supplies Dispense sufficient amount for indicated testing frequency plus additional to accommodate PRN testing needs. Dispense all needed supplies to include: monitor, strips, lancing device, lancets, control solutions, alcohol swabs.  Yes Tabitha Fiore MD   metoprolol succinate (TOPROL XL) 100 MG extended release tablet Take 1 tablet by mouth daily Yes Quincy Gonzalez MD finasteride (PROSCAR) 5 MG tablet  Yes Historical Provider, MD   prednisoLONE acetate (PRED FORTE) 1 % ophthalmic suspension Place 1 drop into the left eye 3 times daily  Yes Historical Provider, MD   Lancets Thin MISC 2-4 Devices by Does not apply route daily 2-4 leilani daily Yes SULEMAN Kruger - CNP   bimatoprost (LUMIGAN) 0.01 % SOLN ophthalmic drops Place 1 drop into the right eye nightly  Yes Historical Provider, MD   dorzolamide (TRUSOPT) 2 % ophthalmic solution Place 1 drop into the right eye 2 times daily  Yes Historical Provider, MD   warfarin (COUMADIN) 5 MG tablet Take 7.5 mg (one and a half tablets) coumadin every evening until directed otherwise by Dr. Bruce Loving  Patient taking differently: Take 5 mg by mouth in the morning. 5 mg everyday,  7.5 on Wed. Carlos Andino MD   brimonidine-timolol (COMBIGAN) 0.2-0.5 % ophthalmic solution Place 1 drop into the right eye every 12 hours  Yes Historical Provider, MD   glimepiride (AMARYL) 1 MG tablet TAKE 1 TABLET EVERY MORNING  Irvine Gilford, MD   metoprolol succinate (TOPROL XL) 100 MG extended release tablet TAKE 1/2 TABLET EVERY DAY  Irvine Gilford, MD       [facility] (Including outside providers/suppliers regularly involved in providing care):   Patient Care Team:  Irvine Gilford, MD as PCP - General (Internal Medicine)  Irvine Gilford, MD as PCP - [facility] Provider  Martin Jacob MD as Consulting Physician (Cardiology)  Mahesh Stevens MD as Surgeon (Vascular Surgery)     Reviewed and updated this visit:  Tobacco  Allergies  Meds  Problems  Med Hx  Surg Hx  Soc Hx  Fam Hx

## 2022-08-25 ENCOUNTER — ANTI-COAG VISIT (OUTPATIENT)
Dept: PHARMACY | Age: 87
End: 2022-08-25
Payer: MEDICARE

## 2022-08-25 DIAGNOSIS — I48.91 ATRIAL FIBRILLATION, UNSPECIFIED TYPE (HCC): Primary | ICD-10-CM

## 2022-08-25 LAB — INTERNATIONAL NORMALIZATION RATIO, POC: 2.9

## 2022-08-25 PROCEDURE — 85610 PROTHROMBIN TIME: CPT

## 2022-08-25 PROCEDURE — 99211 OFF/OP EST MAY X REQ PHY/QHP: CPT

## 2022-08-25 NOTE — PROGRESS NOTES
Mr. Claudeen Book is a 80 y.o. y/o male with history of Afib   He presents today for anticoagulation monitoring and adjustment. Pertinent PMH: DM, CAD, HTN and Colon CA    Patient's wife  10/4/13. Patient Reported Findings:  Yes     No  [x]   []       Patient verifies current dosing regimen as listed -confirms   []   [x]       S/S bleeding/bruising/swelling/SOB - denies  []   [x]       Blood in urine or stool- denies   []   [x]       Procedures scheduled in the future at this time -denies  []   [x]       Missed Dose - Denies   []   [x]       Extra Dose - Denies   []   [x]       Change in medications - takes tylenol as needed for knee aches---> occasional aleve 1 per day if needed ---> has been taking 4 aleve a day d/t knee pain acutely---> some aleve recently dt knee pain --> no changes  []   [x]       Change in health/diet/appetite-Has vegetables 1-2 x weekly. 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 --> struggling with eyesight     Clinical Outcomes:  Yes     No  []   [x]       Major bleeding event  []   [x]       Thromboembolic event    Duration of warfarin Therapy: indefinite  INR Range:  2.0-3.0     INR is 2.9 today   Continue weekly dose of 7.5 mg on Wed and 5 mg all other days of the week. Encouraged to maintain a consistency of vegetables/salads.    Recheck INR in 6 weeks, 10/6    Patient consent signed 10/28/21    Referring cardiologist is Dr. Norma Parsons  PCP Dr. Ria Meyer  INR (no units) Date Value   10/07/2021 2.70   09/27/2021 2.50   09/03/2021 2.30   08/30/2021 1.80     INR,(POC) (no units)   Date Value   08/25/2022 2.9   07/14/2022 2.3   06/02/2022 2.2   04/21/2022 2.7       For Pharmacy Admin Tracking Only    Total # of Interventions Recommended: 0  Total # of Interventions Accepted: 0  Time Spent (min): 15

## 2022-09-06 ENCOUNTER — TELEPHONE (OUTPATIENT)
Dept: CARDIOLOGY CLINIC | Age: 87
End: 2022-09-06

## 2022-09-06 NOTE — TELEPHONE ENCOUNTER
Pt called in stating that he received a call from rehab stating that they hadn't heard from [facility] letting them know if pt was cleared to start rehab or not.     Pt can be reached at [phone]

## 2022-09-07 NOTE — TELEPHONE ENCOUNTER
Faith Dillon called to request get the consent for Phrase 3 maintenance. Pt starts 09/08. Please fax to:  [phone].   Please advise

## 2022-09-08 ENCOUNTER — HOSPITAL ENCOUNTER (OUTPATIENT)
Dept: CARDIAC REHAB | Age: 87
Discharge: HOME | End: 2022-09-08

## 2022-09-08 PROCEDURE — 9900000065 HC CARDIAC REHAB PHASE 3 - 1 VISIT

## 2022-09-13 ENCOUNTER — HOSPITAL ENCOUNTER (OUTPATIENT)
Dept: CARDIAC REHAB | Age: 87
Discharge: HOME | End: 2022-09-13

## 2022-09-13 PROCEDURE — 9900000065 HC CARDIAC REHAB PHASE 3 - 1 VISIT

## 2022-09-15 ENCOUNTER — HOSPITAL ENCOUNTER (OUTPATIENT)
Dept: CARDIAC REHAB | Age: 87
Discharge: HOME | End: 2022-09-15

## 2022-09-15 PROCEDURE — 9900000065 HC CARDIAC REHAB PHASE 3 - 1 VISIT

## 2022-09-20 ENCOUNTER — HOSPITAL ENCOUNTER (OUTPATIENT)
Dept: CARDIAC REHAB | Age: 87
Discharge: HOME | End: 2022-09-20

## 2022-09-20 PROCEDURE — 9900000065 HC CARDIAC REHAB PHASE 3 - 1 VISIT

## 2022-09-22 ENCOUNTER — HOSPITAL ENCOUNTER (OUTPATIENT)
Dept: CARDIAC REHAB | Age: 87
Discharge: HOME | End: 2022-09-22

## 2022-09-22 PROCEDURE — 9900000065 HC CARDIAC REHAB PHASE 3 - 1 VISIT

## 2022-09-27 ENCOUNTER — HOSPITAL ENCOUNTER (OUTPATIENT)
Dept: CARDIAC REHAB | Age: 87
Discharge: HOME | End: 2022-09-27

## 2022-09-27 PROCEDURE — 9900000065 HC CARDIAC REHAB PHASE 3 - 1 VISIT

## 2022-09-29 ENCOUNTER — HOSPITAL ENCOUNTER (OUTPATIENT)
Dept: CARDIAC REHAB | Age: 87
Discharge: HOME | End: 2022-09-29

## 2022-09-29 PROCEDURE — 9900000065 HC CARDIAC REHAB PHASE 3 - 1 VISIT

## 2022-09-29 RX ORDER — METOPROLOL SUCCINATE 100 MG/1
TABLET, EXTENDED RELEASE ORAL
Qty: 90 TABLET | Refills: 1 | Status: SHIPPED | OUTPATIENT
Start: 2022-09-29

## 2022-10-04 ENCOUNTER — HOSPITAL ENCOUNTER (OUTPATIENT)
Dept: CARDIAC REHAB | Age: 87
Discharge: HOME | End: 2022-10-04

## 2022-10-04 LAB
CATARACTS: POSITIVE
DIABETIC RETINOPATHY: NORMAL
GLAUCOMA: NORMAL
INTRAOCULAR PRESSURE LEFT EYE: 10
INTRAOCULAR PRESSURE RIGHT EYE: 28
VISUAL ACUITY DISTANCE LEFT EYE: NORMAL
VISUAL ACUITY DISTANCE RIGHT EYE: NORMAL

## 2022-10-04 PROCEDURE — 9900000065 HC CARDIAC REHAB PHASE 3 - 1 VISIT

## 2022-10-06 ENCOUNTER — ANTI-COAG VISIT (OUTPATIENT)
Dept: PHARMACY | Age: 87
End: 2022-10-06
Payer: MEDICARE

## 2022-10-06 DIAGNOSIS — I48.91 ATRIAL FIBRILLATION, UNSPECIFIED TYPE (HCC): Primary | ICD-10-CM

## 2022-10-06 LAB — INTERNATIONAL NORMALIZATION RATIO, POC: 2.6

## 2022-10-06 PROCEDURE — 99211 OFF/OP EST MAY X REQ PHY/QHP: CPT

## 2022-10-06 PROCEDURE — 85610 PROTHROMBIN TIME: CPT

## 2022-10-06 NOTE — PROGRESS NOTES
Mr. Madeline Farooq is a 80 y.o. y/o male with history of Afib   He presents today for anticoagulation monitoring and adjustment. Pertinent PMH: DM, CAD, HTN and Colon CA    Patient's wife  10/4/13. Patient Reported Findings:  Yes     No  [x]   []       Patient verifies current dosing regimen as listed -confirms   []   [x]       S/S bleeding/bruising/swelling/SOB - denies  []   [x]       Blood in urine or stool- denies   []   [x]       Procedures scheduled in the future at this time -denies  []   [x]       Missed Dose - Denies   []   [x]       Extra Dose - Denies   []   [x]       Change in medications - takes tylenol as needed for knee aches---> occasional aleve 1 per day if needed ---> has been taking 4 aleve a day d/t knee pain acutely---> some aleve recently dt knee pain --> no changes  []   [x]       Change in health/diet/appetite-Has vegetables 1-2 x weekly. 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 --> struggling with eyesight     Clinical Outcomes:  Yes     No  []   [x]       Major bleeding event  []   [x]       Thromboembolic event    Duration of warfarin Therapy: indefinite  INR Range:  2.0-3.0     INR is 2.6 today   Continue weekly dose of 7.5 mg on Wed and 5 mg all other days of the week. Encouraged to maintain a consistency of vegetables/salads. RF called into [facility].   Recheck INR in 6 weeks,     Patient consent signed 10/28/21    Referring cardiologist is Dr. Liz Lake  PCP Dr. TEAGUE [facility]  INR (no units)   Date Value   10/07/2021 2.70   09/27/2021 2.50   09/03/2021 2.30   08/30/2021 1.80     INR,(POC) (no units)   Date Value   10/06/2022 2.6   08/25/2022 2.9   07/14/2022 2.3   06/02/2022 2.2       For Pharmacy Admin Tracking Only    Intervention Detail: Refill(s) Provided  Total # of Interventions Recommended: 1  Total # of Interventions Accepted: 1  Time Spent (min): 15

## 2022-10-06 NOTE — TELEPHONE ENCOUNTER
Warfarin prescription phoned into [facility] [address] to [address] under Dr. Kiya Salomon  Warfarin 5 mg tabs  Take 7.5 mg (5 mg x 1.5) every Wed; 5 mg (5 mg x 1) all other days  90 days   2 refills    Jose Alfredo Betancourt, PharmD, [facility]

## 2022-10-11 ENCOUNTER — HOSPITAL ENCOUNTER (OUTPATIENT)
Dept: CARDIAC REHAB | Age: 87
Discharge: HOME | End: 2022-10-11

## 2022-10-11 PROCEDURE — 9900000065 HC CARDIAC REHAB PHASE 3 - 1 VISIT

## 2022-10-13 ENCOUNTER — HOSPITAL ENCOUNTER (OUTPATIENT)
Dept: CARDIAC REHAB | Age: 87
Discharge: HOME | End: 2022-10-13

## 2022-10-13 PROCEDURE — 9900000065 HC CARDIAC REHAB PHASE 3 - 1 VISIT

## 2022-10-18 ENCOUNTER — HOSPITAL ENCOUNTER (OUTPATIENT)
Dept: CARDIAC REHAB | Age: 87
Discharge: HOME | End: 2022-10-18

## 2022-10-20 ENCOUNTER — HOSPITAL ENCOUNTER (OUTPATIENT)
Dept: CARDIAC REHAB | Age: 87
Discharge: HOME | End: 2022-10-20

## 2022-10-20 PROCEDURE — 9900000065 HC CARDIAC REHAB PHASE 3 - 1 VISIT

## 2022-11-06 PROBLEM — A41.9 SEPSIS (HCC): Status: ACTIVE | Noted: 2022-11-06

## 2022-11-06 NOTE — ED PROVIDER NOTES
[address] [facility]        Pt Name: Bridget Parsons  MRN: [id number]  [place] 6/9/1930  Date of evaluation: 11/6/2022  Provider: LEONEL Barrera  PCP: Di Glass MD  Note Started: 11:36 AM EST        I have seen and evaluated this patient with my supervising physician Ananya Santacruz MD.    [address] [place] [address]       Chief Complaint   Patient presents with    Altered Mental Status     Patients daughter complains of lethargy in patient for a few weeks. Abdominal Pain     After speaking with pts daughters pt is found to be bloated and distended more than usual and is abdomen is rigid after palpation. Pt states that he has some pain approx of 5 out of 10. Pt also states that when he tries to urinate that he stands there and dribbels with no burning sensation noted at this time. HISTORY OF PRESENT ILLNESS   (Location, Timing/Onset, Context/Setting, Quality, Duration, Modifying Factors, Severity, Associated Signs and Symptoms)  Note limiting factors. Chief Complaint: Fatigue    Bridget Parsons is a 80 y.o. male who presents to the emergency department due to fatigue noticed by the daughter. Patient's daughter is at bedside and states that he is history of dementia and they have been noticing that he has been getting more fatigued and forgetful of things. She states that he has been eating he gets Meals on Wheels delivered to his house every day. Patient states that he only has some right shoulder pain has been going on for several weeks. Patient denies any nausea vomiting fevers or chills. Patient has any diarrhea or constipation. Patient has a left lower leg pain. Patient does have a history of A. fib and takes warfarin. He also has a history of hypertension hyperlipidemia and type 2 diabetes among other conditions.     Nursing Notes were all reviewed and agreed with or any disagreements were addressed in the HPI.    REVIEW OF SYSTEMS    (2-9 systems for level 4, 10 or more for level 5)     Review of Systems   Constitutional:  Positive for fatigue. Negative for chills, diaphoresis and fever. HENT:  Negative for congestion, rhinorrhea and sore throat. Eyes:  Negative for pain and visual disturbance. Respiratory:  Negative for cough and shortness of breath. Cardiovascular:  Negative for chest pain and leg swelling. Gastrointestinal:  Negative for abdominal pain, constipation, diarrhea, nausea and vomiting. Genitourinary:  Negative for difficulty urinating, dysuria and frequency. Musculoskeletal:  Negative for back pain and neck pain. Skin:  Negative for rash and wound. Neurological:  Negative for dizziness and light-headedness. Positives and Pertinent negatives as per HPI. Except as noted above in the ROS, all other systems were reviewed and negative.        PAST MEDICAL HISTORY     Past Medical History:   Diagnosis Date    Acute [facility])     Arthritis     Atrial fibrillation (HCC)     CAD (coronary artery disease)     Cancer of colon ([facility] [address]     Glaucoma     Dr. Bridger Randall    Gout, unspecified     Hypertension     Hypertrophy of prostate without urinary obstruction and other lower urinary tract symptoms (LUTS)     Other and unspecified hyperlipidemia     Type II or unspecified type diabetes mellitus without mention of complication, uncontrolled          SURGICAL HISTORY     Past Surgical History:   Procedure Laterality Date    ABDOMEN SURGERY  1992    colon    CAROTID ENDARTERECTOMY Left 2/24/13    CATARACT REMOVAL      left    COLON SURGERY      COLONOSCOPY      CORONARY ANGIOPLASTY WITH STENT PLACEMENT      6 stents  12/24/05    CYSTOSCOPY  3-27-14    EYE SURGERY Left 03/02/2018    glacoma open angle 0S baerveldt implant 0S    EYE SURGERY Left 04/13/2018    posterior sclerotomy and aspiartion choroidal fluid     EYE SURGERY Left 2009    drain    JOINT REPLACEMENT  2010    right knee    OTHER SURGICAL HISTORY      post-op reaction to pain med- very combative    REVISION TOTAL KNEE ARTHROPLASTY Right 08/17/2011    Tibial liner exchange & synovectomy         CURRENTMEDICATIONS       Previous Medications    BIMATOPROST (LUMIGAN) 0.01 % SOLN OPHTHALMIC DROPS    Place 1 drop into the right eye nightly     BLOOD GLUCOSE MONITOR KIT AND SUPPLIES    Dispense sufficient amount for indicated testing frequency plus additional to accommodate PRN testing needs. Dispense all needed supplies to include: monitor, strips, lancing device, lancets, control solutions, alcohol swabs.     BRIMONIDINE-TIMOLOL (COMBIGAN) 0.2-0.5 % OPHTHALMIC SOLUTION    Place 1 drop into the right eye every 12 hours     DILTIAZEM (CARDIZEM CD) 120 MG EXTENDED RELEASE CAPSULE    TAKE 1 CAPSULE EVERY DAY    DORZOLAMIDE (TRUSOPT) 2 % OPHTHALMIC SOLUTION    Place 1 drop into the right eye 2 times daily     FINASTERIDE (PROSCAR) 5 MG TABLET        GLIMEPIRIDE (AMARYL) 1 MG TABLET    TAKE 1 TABLET EVERY DAY    LANCETS THIN MISC    2-4 Devices by Does not apply route daily 2-4 leilani daily    METOPROLOL SUCCINATE (TOPROL XL) 100 MG EXTENDED RELEASE TABLET    TAKE 1 TABLET EVERY DAY    MULTIPLE VITAMINS-MINERALS (DAILY MULTI VITAMIN/MINERALS PO)    Take by mouth    ONETOUCH VERIO STRIP    1 each by In Vitro route daily Test 2-4 times a day E11.9    PREDNISOLONE ACETATE (PRED FORTE) 1 % OPHTHALMIC SUSPENSION    Place 1 drop into the left eye 3 times daily     SIMVASTATIN (ZOCOR) 20 MG TABLET    TAKE 1 TABLET EVERY NIGHT    TAMSULOSIN (FLOMAX) 0.4 MG CAPSULE    TAKE 2 CAPSULES DAILY    WARFARIN (COUMADIN) 5 MG TABLET    Take 7.5 mg (one and a half tablets) coumadin every evening until directed otherwise by Dr. Jeyson Darling     Hydrochlorothiazide, Metformin and related, and Morphine    FAMILYHISTORY       Family History   Problem Relation Age of Onset    High Blood Pressure Mother     Heart Disease Mother     Stroke Mother     High Blood Pressure Father High Cholesterol Neg Hx           SOCIAL HISTORY       Social History     Tobacco Use    Smoking status: Former     Packs/day: 1.00     Years: 30.00     Pack years: 30.00     Types: Cigarettes     Quit date: 1976     Years since quittin.2    Smokeless tobacco: Never   Vaping Use    Vaping Use: Never used   Substance Use Topics    Alcohol use: Yes     Alcohol/week: 10.0 standard drinks     Types: 10 Cans of beer per week    Drug use: No       SCREENINGS    Lilly Coma Scale  Eye Opening: Spontaneous  Best Verbal Response: Oriented  Best Motor Response: Obeys commands  Lilly Coma Scale Score: 15        PHYSICAL EXAM    (up to 7 for level 4, 8 or more for level 5)     ED Triage Vitals [22 1127]   BP Temp Temp Source Heart Rate Resp SpO2 Height Weight   (!) 144/85 98.3 °F (36.8 °C) Oral 89 14 91 % 5' 10\" (1.778 m) 230 lb (104.3 kg)       Physical Exam  Vitals and nursing note reviewed. Constitutional:       General: He is not in acute distress. Appearance: He is obese. He is not ill-appearing. HENT:      Head: Normocephalic. Mouth/Throat:      Mouth: Mucous membranes are moist.      Pharynx: Oropharynx is clear. No pharyngeal swelling or oropharyngeal exudate. Cardiovascular:      Rate and Rhythm: Normal rate. Rhythm irregular. Heart sounds: Normal heart sounds. No murmur heard. No friction rub. No gallop. Pulmonary:      Effort: Pulmonary effort is normal.      Breath sounds: Normal breath sounds. Abdominal:      General: Abdomen is protuberant. Bowel sounds are normal. There is distension. Palpations: Abdomen is soft. Tenderness: There is abdominal tenderness in the right upper quadrant. Negative signs include Apodaca's sign and McBurney's sign. Hernia: No hernia is present. Musculoskeletal:         General: No swelling or tenderness. Neurological:      Mental Status: He is alert and oriented to person, place, and time.    Psychiatric:         Mood and Affect: Mood normal.         Behavior: Behavior normal.       DIAGNOSTIC RESULTS   LABS:    Labs Reviewed   CBC WITH AUTO DIFFERENTIAL - Abnormal; Notable for the following components:       Result Value    WBC 20.2 (*)     RBC 4.14 (*)     Hemoglobin 13.1 (*)     Hematocrit 38.7 (*)     Neutrophils Absolute 18.8 (*)     Lymphocytes Absolute 0.3 (*)     All other components within normal limits   COMPREHENSIVE METABOLIC PANEL W/ REFLEX TO MG FOR LOW K - Abnormal; Notable for the following components:    Sodium 130 (*)     Chloride 95 (*)     Glucose 322 (*)     Total Bilirubin 1.7 (*)     All other components within normal limits   URINALYSIS WITH REFLEX TO CULTURE - Abnormal; Notable for the following components:    Color, UA DARK YELLOW (*)     Glucose, Ur >=1000 (*)     Ketones, Urine TRACE (*)     Blood, Urine MODERATE (*)     Protein, UA 30 (*)     All other components within normal limits   BRAIN NATRIURETIC PEPTIDE - Abnormal; Notable for the following components:    Pro-BNP 1,980 (*)     All other components within normal limits   PROTIME-INR - Abnormal; Notable for the following components:    Protime 29.2 (*)     INR 2.75 (*)     All other components within normal limits   PROCALCITONIN - Abnormal; Notable for the following components:    Procalcitonin 0.34 (*)     All other components within normal limits   MICROSCOPIC URINALYSIS - Abnormal; Notable for the following components:    RBC, UA 25 (*)     All other components within normal limits   CULTURE, BLOOD 1   CULTURE, BLOOD 2   TROPONIN   LACTATE, SEPSIS   LACTATE, SEPSIS       When ordered only abnormal lab results are displayed. All other labs were within normal range or not returned as of this dictation. EKG: When ordered, EKG's are interpreted by the Emergency Department Physician in the absence of a cardiologist.  Please see their note for interpretation of EKG.     RADIOLOGY:   Non-plain film images such as CT, Ultrasound and MRI are read by the radiologistVimal Clarke radiographic images are visualized and preliminarily interpreted by the ED Provider with the below findings:        Interpretation per the Radiologist below, if available at the time of this note:    CT ABDOMEN PELVIS W IV CONTRAST Additional Contrast? None   Final Result   1. Findings suggestive of acute cholecystitis with small gallstones present. No evidence for biliary dilatation. 2.  Simple appearing cystic structures in the pancreas measuring up to 3 cm. Several of these were in the field of view on CT imaging in 2021. 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 Given the patient's   advanced age and relative stability of the largest lesions over 1 year, no   specific follow-up is necessary. 3.  Prostate enlargement with bladder distension and pseudo diverticula,   suggestive of some degree of bladder outlet obstruction and hypertrophy. Question debris or focal bladder wall thickening posteriorly at the apex. 4.  Infrarenal aortic enlargement measuring up to 2.8 cm. Please see   recommendation below. RECOMMENDATIONS:   2.8 cm infrarenal abdominal aortic aneurysm suspected. Recommend follow-up   every 5 years. Reference: J Am Eduardo Radiol 0616;[id number]. XR CHEST PORTABLE   Final Result      1. Opacity in the lateral left mid lower lung field, at inferior right upper   lobe and right lower lung are either unchanged or in the case of some of the   right lower lung findings, sightly improved. Based on prior studies   including CT scans, majority of this appears to be due to scarring or   fibrosis. Small area of superimposed acute pneumonitis would be difficult to   exclude though no definitive acute pulmonary infiltrate is noted. 2.  Stable cardiomegaly. No results found.         PROCEDURES   Unless otherwise noted below, none     Procedures    CRITICAL CARE TIME       CONSULTS:  IP CONSULT TO GENERAL SURGERY      EMERGENCY DEPARTMENT COURSE and DIFFERENTIAL DIAGNOSIS/MDM:   Vitals:    Vitals:    11/06/22 1516 11/06/22 1536 11/06/22 1642 11/06/22 1732   BP: (!) 151/99 (!) 129/109 134/75 137/84   Pulse: (!) 101 100 (!) 116 (!) 106   Resp: 28 23 24 26   Temp:       TempSrc:       SpO2:  (!) 89% (!) 81% 97%   Weight:       Height:           Patient was given the following medications:  Medications   piperacillin-tazobactam (ZOSYN) 3,375 mg in dextrose 5 % 50 mL IVPB extended infusion (mini-bag) (has no administration in time range)   insulin glargine (LANTUS) injection vial 10 Units (has no administration in time range)   0.9 % sodium chloride bolus (0 mLs IntraVENous Stopped 11/6/22 1724)   ipratropium-albuterol (DUONEB) nebulizer solution 1 ampule (1 ampule Inhalation Given 11/6/22 1447)   iopamidol (ISOVUE-370) 76 % injection 75 mL (75 mLs IntraVENous Given 11/6/22 1630)   azithromycin (ZITHROMAX) 500 mg in D5W 250ml Vial Mate (0 mg IntraVENous Stopped 11/6/22 1756)     ED Course as of 11/06/22 1804   Sun Nov 06, 2022   [address] [place] [address] Spoke with Dr. Jadon Steinberg from general surgery about the case and he states he will see the patient tomorrow [PE]      ED Course User Index  [PE] LEONEL Ray      Is this patient to be included in the SEP-1 Core Measure due to severe sepsis or septic shock? No   Exclusion criteria - the patient is NOT to be included for SEP-1 Core Measure due to:  2+ SIRS criteria are not met    51-year-old male presents emergency department due to family's concern for his fatigue and abdominal distention. On exam patient is noted to have some mild rales and some abdominal distention with right upper quadrant tenderness. Work-up to include CBC CMP were initiated. CBC showed leukocytosis of 20,000. Patient's procalcitonin was elevated at 0.34. BNP was 19,000. Lactate was normal at 1.9. Urinalysis was unremarkable. CMP did not show any acute abnormalities other than elevated glucose of 322.   Due to the patient's lung sounds DuoNeb was given and IV fluids were started. Chest x-ray showing concern for opacities possible pneumonia for this reason Rocephin and azithromycin were started initially. Due to the abdominal distention right upper quadrant tenderness CT scan of the abdomen pelvis was ordered and did show acute cholecystitis I discussed this finding with Dr. Allan Cast he stated he will see the patient tomorrow for this. Patient will be admitted to the hospital due to acute cholecystitis, hyponatremia, leukocytosis, and pneumonia. Hospitalist was paged about the patient's case via [employer]. FINAL IMPRESSION      1. Leukocytosis, unspecified type    2. Pneumonia due to infectious organism, unspecified laterality, unspecified part of lung    3. Hyponatremia    4. Acute cholecystitis          DISPOSITION/PLAN   DISPOSITION Admitted 11/06/2022 05:39:29 PM      PATIENT REFERRED TO:  No follow-up provider specified.     DISCHARGE MEDICATIONS:  New Prescriptions    No medications on file       DISCONTINUED MEDICATIONS:  Discontinued Medications    No medications on file              (Please note that portions of this note were completed with a voice recognition program.  Efforts were made to edit the dictations but occasionally words are mis-transcribed.)    Adelene Riedel, PA (electronically signed)            Adelene Riedel, PA  11/06/22 1940

## 2022-11-06 NOTE — ED NOTES
Did not need to straight cath patient due to patient urinating for me in a urinal     Ileene MAIRAH Murphy  11/06/22 8538

## 2022-11-06 NOTE — H&P
HOSPITALISTS HISTORY AND PHYSICAL    11/6/2022 6:05 PM    Patient Information:  Mica Bhatt is a 80 y.o. male [id number]  PCP:  Agnes Tse MD (Tel: [phone] )    Chief complaint:    Chief Complaint   Patient presents with    Altered Mental Status     Patients daughter complains of lethargy in patient for a few weeks. Abdominal Pain     After speaking with pts daughters pt is found to be bloated and distended more than usual and is abdomen is rigid after palpation. Pt states that he has some pain approx of 5 out of 10. Pt also states that when he tries to urinate that he stands there and dribbels with no burning sensation noted at this time. History of Present Illness:  Michelle Pizarro is a 80 y.o. male who on Friday has been having fatigue and right upper quadrant abdominal pain not associate with nausea vomiting fevers or chills no chest pain or shortness of breath. REVIEW OF SYSTEMS:   Constitutional: Negative for fever,chills or night sweats  ENT: Negative for rhinorrhea, epistaxis, hoarseness, sore throat. Respiratory: Negative for shortness of breath,wheezing  Cardiovascular: Negative for chest pain, palpitations   Gastrointestinal:see above  Genitourinary: Negative for polyuria, dysuria   Hematologic/Lymphatic: Negative for bleeding tendency, easy bruising  Musculoskeletal: Negative for myalgias and arthralgias  Neurologic: Negative for confusion,dysarthria. Skin: Negative for itching,rash  Psychiatric: Negative for depression,anxiety, agitation. Endocrine: Negative for polydipsia,polyuria,heat /cold intolerance.     Past Medical History:   has a past medical history of Acute MI ([address] Arthritis, Atrial fibrillation ([address] CAD (coronary artery disease), Cancer of colon ([facility] [address] Glaucoma, Gout, unspecified, Hypertension, Hypertrophy of prostate without urinary obstruction and other lower urinary tract symptoms (LUTS), Other and unspecified hyperlipidemia, and Type II or unspecified type diabetes mellitus without mention of complication, uncontrolled. Past Surgical History:   has a past surgical history that includes Colon surgery; Cataract removal; Coronary angioplasty with stent; other surgical history; Revision total knee arthroplasty (Right, 08/17/2011); Abdomen surgery (1992); Colonoscopy; Carotid endarterectomy (Left, 2/24/13); Cystocopy (3-27-14); joint replacement (2010); Eye surgery (Left, 03/02/2018); Eye surgery (Left, 04/13/2018); and eye surgery (Left, 2009). Medications:  No current facility-administered medications on file prior to encounter. Current Outpatient Medications on File Prior to Encounter   Medication Sig Dispense Refill    metoprolol succinate (TOPROL XL) 100 MG extended release tablet TAKE 1 TABLET EVERY DAY 90 tablet 1    simvastatin (ZOCOR) 20 MG tablet TAKE 1 TABLET EVERY NIGHT 90 tablet 1    tamsulosin (FLOMAX) 0.4 MG capsule TAKE 2 CAPSULES DAILY 180 capsule 1    glimepiride (AMARYL) 1 MG tablet TAKE 1 TABLET EVERY DAY 90 tablet 1    dilTIAZem (CARDIZEM CD) 120 MG extended release capsule TAKE 1 CAPSULE EVERY DAY 90 capsule 1    Multiple Vitamins-Minerals (DAILY MULTI VITAMIN/MINERALS PO) Take by mouth      ONETOUCH VERIO strip 1 each by In Vitro route daily Test 2-4 times a day E11.9 300 each 0    blood glucose monitor kit and supplies Dispense sufficient amount for indicated testing frequency plus additional to accommodate PRN testing needs. Dispense all needed supplies to include: monitor, strips, lancing device, lancets, control solutions, alcohol swabs.  1 kit 0    finasteride (PROSCAR) 5 MG tablet       [DISCONTINUED] glimepiride (AMARYL) 1 MG tablet TAKE 1 TABLET EVERY MORNING 90 tablet 0    [DISCONTINUED] metoprolol succinate (TOPROL XL) 100 MG extended release tablet TAKE 1/2 TABLET EVERY DAY 45 tablet 0    prednisoLONE acetate (PRED FORTE) 1 % ophthalmic suspension Place 1 drop into the left eye 3 times daily       Lancets Thin MISC 2-4 Devices by Does not apply route daily 2-4 leilani daily 300 each 3    bimatoprost (LUMIGAN) 0.01 % SOLN ophthalmic drops Place 1 drop into the right eye nightly       dorzolamide (TRUSOPT) 2 % ophthalmic solution Place 1 drop into the right eye 2 times daily       warfarin (COUMADIN) 5 MG tablet Take 7.5 mg (one and a half tablets) coumadin every evening until directed otherwise by Dr. Burak Evangelista (Patient taking differently: Take 5 mg by mouth daily Continue weekly dose of 7.5 mg on Wed and 5 mg all other days of the week. Adjusted by F CC.) 90 tablet 3    brimonidine-timolol (COMBIGAN) 0.2-0.5 % ophthalmic solution Place 1 drop into the right eye every 12 hours          Allergies: Allergies   Allergen Reactions    Hydrochlorothiazide Other (See Comments)     Gout. Metformin And Related Diarrhea    Morphine      PCA, caused confusion        Social History:  Patient Lives at home   reports that he quit smoking about 46 years ago. His smoking use included cigarettes. He has a 30.00 pack-year smoking history. He has never used smokeless tobacco. He reports current alcohol use of about 10.0 standard drinks per week. He reports that he does not use drugs. Family History:  family history includes Heart Disease in his mother; High Blood Pressure in his father and mother; Stroke in his mother. ,     Physical Exam:  /84   Pulse (!) 106   Temp 98.3 °F (36.8 °C) (Oral)   Resp 26   Ht 5' 10\" (1.778 m)   Wt 230 lb (104.3 kg)   SpO2 97%   BMI 33.00 kg/m²     General appearance:  Appears comfortable.  AAOx3  HEENT: atraumatic, Pupils equal, muscous membranes moist, no masses appreciated  Cardiovascular: irregulary irregular no murmurs appreciated  Respiratory: CTAB no wheezing  Gastrointestinal: slight Right upper quadrant tenderness to palpation no guarding or rigidity  EXT: no edema  Neurology: no gross focal deficts  Psychiatry: Appropriate affect. Not agitated  Skin: Warm, dry, no rashes appreciated    Labs:  CBC:   Lab Results   Component Value Date/Time    WBC 20.2 11/06/2022 11:36 AM    RBC 4.14 11/06/2022 11:36 AM    HGB 13.1 11/06/2022 11:36 AM    HCT 38.7 11/06/2022 11:36 AM    MCV 93.4 11/06/2022 11:36 AM    MCH 31.5 11/06/2022 11:36 AM    MCHC 33.8 11/06/2022 11:36 AM    RDW 15.3 11/06/2022 11:36 AM     11/06/2022 11:36 AM    MPV 8.2 11/06/2022 11:36 AM     BMP:    Lab Results   Component Value Date/Time     11/06/2022 11:36 AM    K 4.3 11/06/2022 11:36 AM    CL 95 11/06/2022 11:36 AM    CO2 27 11/06/2022 11:36 AM    BUN 13 11/06/2022 11:36 AM    CREATININE 0.8 11/06/2022 11:36 AM    CALCIUM 9.6 11/06/2022 11:36 AM    GFRAA >60 08/15/2022 11:43 AM    GFRAA >60 08/02/2011 09:07 AM    LABGLOM >60 11/06/2022 11:36 AM    GLUCOSE 322 11/06/2022 11:36 AM     CT ABDOMEN PELVIS W IV CONTRAST Additional Contrast? None   Final Result   1. Findings suggestive of acute cholecystitis with small gallstones present. No evidence for biliary dilatation. 2.  Simple appearing cystic structures in the pancreas measuring up to 3 cm. Several of these were in the field of view on CT imaging in 2021. 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 Given the patient's   advanced age and relative stability of the largest lesions over 1 year, no   specific follow-up is necessary. 3.  Prostate enlargement with bladder distension and pseudo diverticula,   suggestive of some degree of bladder outlet obstruction and hypertrophy. Question debris or focal bladder wall thickening posteriorly at the apex. 4.  Infrarenal aortic enlargement measuring up to 2.8 cm. Please see   recommendation below. RECOMMENDATIONS:   2.8 cm infrarenal abdominal aortic aneurysm suspected. Recommend follow-up   every 5 years.       Reference: J Am Eduardo Radiol [id number];[id number]. XR CHEST PORTABLE   Final Result      1. Opacity in the lateral left mid lower lung field, at inferior right upper   lobe and right lower lung are either unchanged or in the case of some of the   right lower lung findings, sightly improved. Based on prior studies   including CT scans, majority of this appears to be due to scarring or   fibrosis. Small area of superimposed acute pneumonitis would be difficult to   exclude though no definitive acute pulmonary infiltrate is noted. 2.  Stable cardiomegaly. Recent imaging reviewed    Problem List  Principal Problem:    Sepsis ([address]  Resolved Problems:    * No resolved hospital problems. *        Assessment/Plan:   Sepsis secondary to acute cholecystitis  - iv atbx  - surgery consult    PAF: home meds hold coumadin for possible surgery    Dm2 with hyperglycemia start lantus and ssi    Hyponatremia ivf    DVT prophylaxis full code  Code status coumadin        Admit as inpatient I anticipate hospitalization spanning more than two midnights for investigation and treatment of the above medically necessary diagnoses. Please note that some part of this chart was generated using Dragon dictation software. Although every effort was made to ensure the accuracy of this automated transcription, some errors in transcription may have occurred inadvertently. If you may need any clarification, please do not hesitate to contact me through [facility].        Sandra Whiteside MD    11/6/2022 6:05 PM

## 2022-11-06 NOTE — ACP (ADVANCE CARE PLANNING)
Advanced Care Planning Note. Purpose of Encounter: Advanced care planning in light of hospitalization  Parties In Attendance: Patient,    Decisional Capacity: Yes  Subjective: Patient  understand that this conversation is to address long term care goal  Objective: Admitted to hospital with sepsis and acute cholecystitis  Goals of Care Determination:  At this time will be full code is leaning toward DNR CCA, to discuss with family before making final decision  Code Status: full code  Time spent on Advanced care Plannin minutes  Advanced Care Planning Documents: documented patient's wishes, would like Ron Ontiveros  to make medical decisions if unable to make decisions    Ramos Angulo MD  2022 6:08 PM

## 2022-11-07 PROBLEM — D72.829 LEUKOCYTOSIS: Status: ACTIVE | Noted: 2022-01-01

## 2022-11-07 NOTE — PLAN OF CARE
Problem: Pain  Goal: Verbalizes/displays adequate comfort level or baseline comfort level  11/7/2022 1117 by Rossaan Milton RN  Outcome: Progressing  11/7/2022 0134 by Carole Judge RN  Outcome: Progressing     Problem: Safety - Adult  Goal: Free from fall injury  Outcome: Progressing

## 2022-11-07 NOTE — PROGRESS NOTES
Facility/Department: [address]  Initial Assessment  DYSPHAGIA BEDSIDE SWALLOW EVALUATION     Patient: Jaret Hess   : 1930   MRN: [id number]      Evaluation Date: 2022   Admitting Diagnosis: Acute cholecystitis [K81.0]  Hyponatremia [E87.1]  Sepsis ([facility] [address] [A41.9]  Leukocytosis, unspecified type [D72.829]  Pneumonia due to infectious organism, unspecified laterality, unspecified part of lung [J18.9]  Pain: Did not state                                                       H&P: 2022 Héctor Hogan is a 80 y.o. male who on Friday has been having fatigue and right upper quadrant abdominal pain not associate with nausea vomiting fevers or chills no chest pain or shortness of breath. \"      Imaging:  Chest X-ray: 2022:  Impression       1. Opacity in the lateral left mid lower lung field, at inferior right upper   lobe and right lower lung are either unchanged or in the case of some of the   right lower lung findings, sightly improved. Based on prior studies   including CT scans, majority of this appears to be due to scarring or   fibrosis. Small area of superimposed acute pneumonitis would be difficult to   exclude though no definitive acute pulmonary infiltrate is noted. 2.  Stable cardiomegaly. Prior Modified Barium Swallow Study:  2021:  \"Modified Barium Swallow evaluation completed on 2021. Results indicate mild/moderate oropharyngeal dysphagia. Pt demonstrated delayed swallow initiation with delayed/decreased pharyngeal clearing resulting in pharyngeal stasis after the swallow. Prolonged pharyngeal pooling was noted at times (I.e., greater than 30 seconds) with cues required to swallow. Decreased pharyngeal sensation was suspected as pt demonstrated prolonged delay in swallow initiation with material pooled to the level of the pyriforms and pharyngeal stasis after the swallow with inconsistent timely reflexive clearing swallows.  No aspiration was viewed during this study, intermittent shallow laryngeal penetration was noted with thin liquids. Given increased time pt seemingly tolerate regular solids and thin liquids, he benefited from small sips/bites, cues to double swallow and increased time. Pt demonstrates increased risk for aspiration due to pharyngeal phase deficits. Following study pt demonstrated wet vocal quality, suspect due to pharyngeal stasis, he benefited from cues to cough and re-swallow. See below for recommendations. Education was provided re; safe swallow strategies, results of Modified Barium Swallow and recommendations for dysphagia tx. Pt verbalized hesitation to work with speech therapy but agreement to trial tx. \"     History/Prior Level of Function:   Living Status: Home   Prior Dysphagia History: Pt seen 8/21 for mild/moderate oral pharyngeal dysphagia. MBSS completed on 8/17/21 with findings of pharyngeal clearing and reduced pharyngeal sensation. Shallow penetration with thin liquids. Pt benefited from small bites/spis, double swallow, and increased time. Pt placed on regular solids with thin liquids. Per discussion with pt's family, they report pt was likely aspirating prior to admission with increased coughing during meals. Reason for referral: SLP evaluation orders received due to coughing with intake  and prior hx of dysphagia     Dysphagia Impressions/Diagnosis: Oropharyngeal Dysphagia   Pt lethargic but easily aroused upon entrance, semi-reclined in bed and repositioned for po trials. Pt on 4L O2 via nasal cannula. Pt with audible wheezing and wet vocal quality upon entrance. Pt reports difficulty swallowing and globus sensation. Oral mechanism exam WFL, reduced laryngeal elevation noted via manual palpation. Pt with difficulty producing volitional swallow. Volitional cough wet and weak. Po trials of thin liquid via tea spoon and cup, ice chip, and jello given to assess swallow safety and function.  Oral phase characterized by adequate labial seal and timely mastication, suspect posterior pre-spill. Pharyngeal phase characterized by reduced laryngeal elevation, delayed swallow, and suspected pharyngeal pooling. Posterior pre-spill and pharyngeal pooling with delayed swallow and reduced laryngeal elevation contributing to suspected aspiration with thin liquids, ice chips and jello. Pt with delayed coughing/throat clearing with all textures. Trials of thin liquid via cup and tea spoon producing delayed cough and wet vocal quality. Pt with increased WOB, wheezing, and intermittent delayed belching following po trials. Suspect possible retrograde flow leading to silent aspiration. Therefore, an MBS is indicated to further assess oropharyngeal vs esophageal dysphagia and to conclusively rule out aspiration. Pt at an increased risk for aspiration due to current prior hx dysphagia, cognitive state, inability to self feed, and current respiratory status. Based on today's assessment recommend strict NPO with medication via alternative means as able. Recommended Diet and Intervention 11/7/2022:  Diet Solids Recommendation:  NPO  Liquid Consistency Recommendation:  NPO Recommended form of Meds: Meds via alternative means        Compensatory Swallowing Strategies: To be determined     SHORT TERM DYSPHAGIA GOALS/PLAN OF CARE: Speech therapy for dysphagia tx 3-5 times per week during acute care stay.     Pt will functionally tolerate ongoing assessment of swallow function with diet to be determined as indicated     Dysphagia Therapeutic Intervention:  Patient/Family Education , Therapeutic Trials with SLP     Discharge Recommendations: Discharge recommendations to be determined pending ongoing follow-up during acute care stay    Patient Positioning: Upright in bed     Current Diet Level (prior to evaluation): Clear liquids        Respiratory Status:   []Room Air   [x]O2 via nasal cannula   []Other:    Dentition:  [x]Adequate  []Dentures []Missing Many Teeth  []Edentulous  []Other:    Baseline Vocal Quality:  []Normal  []Dysphonic   []Aphonic   []Hoarse  [x]Wet  []Weak  []Other:    Volitional Cough:  Elicited: Weak  and Wet     Volitional Swallow:   []Absent   [x]Delayed     []Adequate     []Required use of drink     Oral Mechanism Exam:  [x]WFL []Mild   [] Moderate  []Severe  []To be assessed  Impaired:   []Left side      []Right side    []Labial ROM/Coordination    []Labial Symmetry   []Lingual ROM/Coordination   []Lingual Symmetry  []Gag  []Other:     Oral Phase: []WFL [x]Mild   [x] Moderate  []Severe  []To be assessed   []Impaired/Prolonged Mastication:   []Oral Holding:   []Spillage Left:   []Spillage Right:  []Pocketing Left:   []Pocketing Right:   []Decreased Anterior to Posterior Transit:   [x]Suspected Premature Bolus Loss:   []Lingual/Palatal Residue:   []Other:     Pharyngeal Phase: []WFL [x]Mild   [x] Moderate  []Severe  []To be assessed   [x]Delayed Swallow:   [x]Suspected Pharyngeal Pooling:   [x]Decreased Laryngeal Elevation:   []Absent Swallow:  [x]Wet Vocal Quality:prior to and following trials of thin liquids    []Throat Clearing-Immediate:   [x]Throat Clearing-Delayed:   []Cough-Immediate:   [x]Cough-Delayed:  []Change in Vital Signs:  []Suspected Delayed Pharyngeal Clearing:  [x]Other: suspect retrograde flow from esophagus     Eating Assistance:  []Independent  []Setup or clean-up assistance   [] Supervision or touching assistance   [x] Partial or moderate assistance   [] Substantial or maximal assistance  [] Dependent     EDUCATION:   Provided education regarding role of SLP, results of assessment, recommendations and general speech pathology plan of care. [] Pt verbalized understanding and agreement   [x] Pt requires ongoing learning   [] No evidence of comprehension     If patient discharges prior to next visit, this note will serve as discharge.      Treatment time:  Timed Code Treatment Minutes: 0  Total Treatment time: 30 min    Electronically signed by:      URIAH Kay  Speech-Language Pathology Graduate Clinician    The speech-language pathologist was present, directed the patient's care, made skilled judgment and was responsible for assessment and treatment.     Inez Castillo [facility]#[id number]

## 2022-11-07 NOTE — CONSULTS
REPLACEMENT  2010    right knee    OTHER SURGICAL HISTORY      post-op reaction to pain med- very combative    REVISION TOTAL KNEE ARTHROPLASTY Right 08/17/2011    Tibial liner exchange & synovectomy       Scheduled Meds:   piperacillin-tazobactam  3,375 mg IntraVENous Q8H    dilTIAZem  120 mg Oral Daily    dorzolamide  1 drop Right Eye BID    finasteride  5 mg Oral Daily    metoprolol succinate  100 mg Oral Daily    tamsulosin  0.8 mg Oral Daily    insulin lispro  0-8 Units SubCUTAneous TID     insulin lispro  0-4 Units SubCUTAneous Nightly    sodium chloride flush  5-40 mL IntraVENous 2 times per day    enoxaparin  30 mg SubCUTAneous BID    docusate sodium  100 mg Oral BID    polyethylene glycol  17 g Oral Daily    brimonidine  1 drop Right Eye BID    And    timolol  1 drop Right Eye BID     Continuous Infusions:   sodium chloride 100 mL/hr at 11/06/22 2051    sodium chloride      dextrose       PRN Meds:.hydrALAZINE, potassium chloride **OR** potassium alternative oral replacement **OR** potassium chloride, magnesium sulfate, sodium phosphate IVPB **OR** sodium phosphate IVPB **OR** sodium phosphate IVPB, sodium chloride flush, sodium chloride, ondansetron **OR** ondansetron, polyethylene glycol, acetaminophen **OR** acetaminophen, dextrose bolus **OR** dextrose bolus, glucagon (rDNA), dextrose    Prior to Admission medications    Medication Sig Start Date End Date Taking?  Authorizing Provider   metoprolol succinate (TOPROL XL) 100 MG extended release tablet TAKE 1 TABLET EVERY DAY 9/29/22   Buddie Cranker, MD   simvastatin (ZOCOR) 20 MG tablet TAKE 1 TABLET EVERY NIGHT 8/11/22   SULEMAN Boswell CNP   tamsulosin (FLOMAX) 0.4 MG capsule TAKE 2 CAPSULES DAILY 8/11/22   SULEMAN Boswell CNP   glimepiride (AMARYL) 1 MG tablet TAKE 1 TABLET EVERY DAY 7/18/22   May Sanabria MD   dilTIAZem [facility] CD) 120 MG extended release capsule TAKE 1 CAPSULE EVERY DAY 7/18/22   May Sanabria MD Multiple Vitamins-Minerals (DAILY MULTI VITAMIN/MINERALS PO) Take by mouth    Historical Provider, MD Ashu Parks strip 1 each by In Vitro route daily Test 2-4 times a day E11.9 4/25/22   Frances Barron MD   blood glucose monitor kit and supplies Dispense sufficient amount for indicated testing frequency plus additional to accommodate PRN testing needs. Dispense all needed supplies to include: monitor, strips, lancing device, lancets, control solutions, alcohol swabs. 3/24/22   Chema Estes MD   finasteride (PROSCAR) 5 MG tablet  8/26/21   Historical Provider, MD   glimepiride (AMARYL) 1 MG tablet TAKE 1 TABLET EVERY MORNING 1/18/21   Frances Barron MD   metoprolol succinate (TOPROL XL) 100 MG extended release tablet TAKE 1/2 TABLET EVERY DAY 1/18/21   Frances Barron MD   prednisoLONE acetate (PRED FORTE) 1 % ophthalmic suspension Place 1 drop into the left eye 3 times daily  6/25/20   Historical Provider, MD   Lancets Thin MISC 2-4 Devices by Does not apply route daily 2-4 leilani daily 12/18/19   SULEMAN Adams - CNP   bimatoprost (LUMIGAN) 0.01 % SOLN ophthalmic drops Place 1 drop into the right eye nightly     Historical Provider, MD   dorzolamide (TRUSOPT) 2 % ophthalmic solution Place 1 drop into the right eye 2 times daily     Historical Provider, MD   warfarin (COUMADIN) 5 MG tablet Take 7.5 mg (one and a half tablets) coumadin every evening until directed otherwise by Dr. Pooja Vital  Patient taking differently: Take 5 mg by mouth daily Continue weekly dose of 7.5 mg on Wed and 5 mg all other days of the week. Adjusted by [facility]. 7/26/12   Alfonzo Curry MD   brimonidine-timolol (COMBIGAN) 0.2-0.5 % ophthalmic solution Place 1 drop into the right eye every 12 hours     Historical Provider, MD        Allergies:  Hydrochlorothiazide, Metformin and related, and Morphine    Social History:   Social History     Socioeconomic History    Marital status:       Spouse name: None    Number of children: None    Years of education: None    Highest education level: None   Tobacco Use    Smoking status: Former     Packs/day: 1.00     Years: 30.00     Pack years: 30.00     Types: Cigarettes     Quit date: 1976     Years since quittin.2    Smokeless tobacco: Never   Vaping Use    Vaping Use: Never used   Substance and Sexual Activity    Alcohol use:  Yes     Alcohol/week: 10.0 standard drinks     Types: 10 Cans of beer per week    Drug use: No     Social Determinants of Health     Financial Resource Strain: Low Risk     Difficulty of Paying Living Expenses: Not hard at all   Food Insecurity: No Food Insecurity    Worried About Running Out of Food in the Last Year: Never true    Ran Out of Food in the Last Year: Never true   Physical Activity: Insufficiently Active    Days of Exercise per Week: 4 days    Minutes of Exercise per Session: 10 min       Family History:    Family History   Problem Relation Age of Onset    High Blood Pressure Mother     Heart Disease Mother     Stroke Mother     High Blood Pressure Father     High Cholesterol Neg Hx        Review of Systems:  CONSTITUTIONAL:  Negative except as above  HEENT:  negative  RESPIRATORY:  negative  CARDIOVASCULAR:  negative  GASTROINTESTINAL:  negative except as above   GENITOURINARY:  negative  HEMATOLOGIC/LYMPHATIC:  negative  NEUROLOGICAL:  Negative      Vital Signs:  Patient Vitals for the past 24 hrs:   BP Temp Temp src Pulse Resp SpO2 Height Weight   22 0626 (!) 146/82 97.6 °F (36.4 °C) -- (!) 111 22 94 % -- --   22 0551 (!) 164/105 -- -- (!) 112 24 -- -- --   22 0356 (!) 170/95 97.6 °F (36.4 °C) Oral (!) 108 24 94 % -- --   22 2311 (!) 153/85 97.4 °F (36.3 °C) Oral (!) 105 22 95 % -- --   22 1849 (!) 151/80 97.6 °F (36.4 °C) Oral 88 22 97 % -- --   22 1817 (!) 147/80 -- -- (!) 106 16 (!) 88 % -- --   22 1747 (!) 138/93 -- -- (!) 111 20 97 % -- --   22 1732 137/84 -- -- (!) 106 26 97 % -- --   22 1642 134/75 -- -- (!) 116 24 (!) 81 % -- --   22 1536 (!) 129/109 -- -- 100 23 (!) 89 % -- --   22 1516 (!) 151/99 -- -- (!) 101 28 -- -- --   22 1456 (!) 151/90 -- -- (!) 102 21 -- -- --   22 1449 -- -- -- 98 24 96 % -- --   22 1127 (!) 144/85 98.3 °F (36.8 °C) Oral 89 14 91 % 5' 10\" (1.778 m) 230 lb (104.3 kg)      TEMPERATURE HISTORY 24H: Temp (24hrs), Av.7 °F (36.5 °C), Min:97.4 °F (36.3 °C), Max:98.3 °F (36.8 °C)    BLOOD PRESSURE HISTORY: Systolic (90EOM), OYC:068 , Min:129 , LFZ:787    Diastolic (20LUB), SHERRILL:19, Min:75, Max:109    Admission Weight: 230 lb (104.3 kg)       Intake/Output Summary (Last 24 hours) at 2022 5828  Last data filed at 2022 0019  Gross per 24 hour   Intake 10 ml   Output --   Net 10 ml     Drain/tube Output:         Physical Exam:  Constitutional:  well-developed, well-nourished,   Neurologic: lethargic, Orientation:  Oriented to person, place, time, follows commands, clear speech, cranial nerves grossly intact  Psychiatric: normal affect, no hallucinations  Eyes:  sclera clear, no visible discharge  Head, ears, nose, mouth, throat: normocephalic, without obvious abnormality, supple, symmetrical, trachea midline, no JVD, mucous membranes moist  Cardiovascular: regular rate and rhythm   Respiratory: clear to auscultation  GI: soft, non-distended, moderate upper abdominal tenderness without peritonitis  Lymph: no palpable lymphadenopathy  Skin: no bruising or bleeding, normal skin color, texture, turgor, and no redness, warmth, or swelling    Labs:    CBC:    Recent Labs     22  1136 22   WBC 20.2* 17.3*   HGB 13.1* 12.3*   HCT 38.7* 37.3*    170     BMP:    Recent Labs     22  1136 22   * 134*   K 4.3 3.7   CL 95* 96*   CO2 27 28   BUN 13 13   CREATININE 0.8 0.6*   GLUCOSE 322* 190*     Hepatic:   Recent Labs     22  1136 22   AST 19 17   ALT 13 11 BILITOT 1.7* 1.4*   ALKPHOS 81 81     Amylase: No results for input(s): AMYLASE in the last 72 hours. Lipase:   Recent Labs     11/07/22 0417   LIPASE 20.0     Mag:      Recent Labs     11/07/22 0417   MG 1.80      Phos:     Recent Labs     11/07/22 0417   PHOS 1.8*      Coags:   Recent Labs     11/06/22  1136 11/07/22 0417   INR 2.75* 3.29*   APTT  --  61.9*       Imaging:  I have personally reviewed the following films:  CT ABDOMEN PELVIS W IV CONTRAST Additional Contrast? None    Result Date: 11/6/2022  EXAMINATION: CT OF THE ABDOMEN AND PELVIS WITH CONTRAST 11/6/2022 2:23 pm TECHNIQUE: CT of the abdomen and pelvis was performed with the administration of intravenous contrast. Multiplanar reformatted images are provided for review. Automated exposure control, iterative reconstruction, and/or weight based adjustment of the mA/kV was utilized to reduce the radiation dose to as low as reasonably achievable. COMPARISON: Chest radiograph today and chest CT 08/17/2021. No prior abdominal CT imaging available. HISTORY: ORDERING SYSTEM PROVIDED HISTORY: distension, constipation TECHNOLOGIST PROVIDED HISTORY: Reason for exam:->distension, constipation Additional Contrast?->None Decision Support Exception - unselect if not a suspected or confirmed emergency medical condition->Emergency Medical Condition (MA) Reason for Exam: Altered Mental Status (Patients daughter complains of lethargy in patient for a few weeks.  ) FINDINGS: Lower Chest: Course reticular and ground-glass opacities in the lung bases again demonstrated and grossly unchanged, likely reflecting chronic interstitial lung disease. Organs: The gallbladder is distended with small stones present, wall thickening and surrounding fat stranding suggestive of acute cholecystitis. No evidence for biliary dilatation. Multiple small cysts are present in the pancreatic body, head and neck measuring up to 3 cm. No evidence for pancreatic duct dilatation.   The liver, spleen, adrenals and kidneys reveal no acute findings. Mild thickening of the left adrenal gland again demonstrated. GI/Bowel: There is no bowel dilatation or wall thickening identified. Moderate stool burden throughout the colon. Pelvis: Bladder is well distended with findings compatible with pseudo diverticula. Prostate enlargement noted. There is suggestion of focal wall thickening versus layering debris posteriorly in the apex. Peritoneum/Retroperitoneum: No free air, ascites or lymphadenopathy. Infrarenal aortic enlargement measuring 2.8 x 2.7 cm. Calcified atheromatous plaque is present. Bones/Soft Tissues: No acute findings. Advanced disc disease and facet arthropathy in the lower lumbar spine. Moderate degenerative change in the hips. 1.  Findings suggestive of acute cholecystitis with small gallstones present. No evidence for biliary dilatation. 2.  Simple appearing cystic structures in the pancreas measuring up to 3 cm. Several of these were in the field of view on CT imaging in 2021. 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 Given the patient's advanced age and relative stability of the largest lesions over 1 year, no specific follow-up is necessary. 3.  Prostate enlargement with bladder distension and pseudo diverticula, suggestive of some degree of bladder outlet obstruction and hypertrophy. Question debris or focal bladder wall thickening posteriorly at the apex. 4.  Infrarenal aortic enlargement measuring up to 2.8 cm. Please see recommendation below. RECOMMENDATIONS: 2.8 cm infrarenal abdominal aortic aneurysm suspected. Recommend follow-up every 5 years. Reference: J Am Eduardo Radiol 5316;73:[id number].      XR CHEST PORTABLE    Result Date: 11/6/2022  EXAM: XR Chest, 1 View EXAM DATE/TIME: 11/6/2022 11:43 am CLINICAL HISTORY: ORDERING SYSTEM PROVIDED  Fatigue, rales on exam  TECHNOLOGIST PROVIDED HISTORY:  Reason for exam:->Fatigue, rales on exam  Reason for Exam: Altered Mental Status (Patients daughter complains of lethargy in patient for a few weeks.  ) TECHNIQUE: Frontal view of the chest. COMPARISON: 08/14/2021 FINDINGS: Lungs:  Opacity in the lateral left mid lower lung field, at inferior right upper lobe and right lower lung are either unchanged or in the case of some of the right lower lung findings, sightly improved. Based on prior studies including CT scans, majority of this appears to be due to scarring or fibrosis. Small area of superimposed acute pneumonitis would be difficult to exclude though no definitive acute pulmonary infiltrate is noted. Pleural space:  No acute findings. No pneumothorax. Heart:  Stable cardiomegaly. Mediastinum:  No acute findings. Bones/joints:  No acute findings. 1.  Opacity in the lateral left mid lower lung field, at inferior right upper lobe and right lower lung are either unchanged or in the case of some of the right lower lung findings, sightly improved. Based on prior studies including CT scans, majority of this appears to be due to scarring or fibrosis. Small area of superimposed acute pneumonitis would be difficult to exclude though no definitive acute pulmonary infiltrate is noted. 2.  Stable cardiomegaly.        Cultures:  Relevant cultures documented under results section     Assessment:  Patient Active Problem List   Diagnosis    Osteoarthritis of knee    Atrial fibrillation (HCC)    CAD (coronary artery disease)    Hyperlipidemia    HTN (hypertension)    Type 2 diabetes mellitus without complication, without long-term current use of insulin (HCC)    Carotid artery disease (HCC)    Cataract    Glaucoma    Benign prostatic hyperplasia with urinary frequency    BPH with urinary obstruction    Pancreatic cyst    Pleural plaque    NSVT (nonsustained ventricular tachycardia)    Nonrheumatic aortic valve insufficiency    Sepsis ([address]     Cholecystitis  Atrial fibrillation  Medical coagulopathy, on coumadin  Diabetes  CAD    Plan:  1. The patient has a history, exam, and imaging consistent with cholecystitis. Typically would proceed with cholecystectomy later this admission but will need to discuss further. Pending clinical progress will need to discuss further risks and benefits of procedure as patient and family may wish to proceed nonoperatively. Palliative care is also consulted to help further guide goals of care   2. Clear liquids  3. IVF  4. Antibiotics  5. Pain medication and antiemetics as needed  6. Will start bowel regimen, constipation both clinically and radiographically, contributing to symptoms  7. Hold PO anticoagulation, will need INR under 1.5 to consider surgery  8. Defer management of remainder of medical comorbidities to primary and consulting teams    This plan was discussed at length with the patient. He was understanding and in agreement with the plan  Thank you for the consult and allowing me to participate in the care of this patient. I look forward to following him this admission    Mike Rutledge MD, FACS  11/7/2022  8:07 AM

## 2022-11-07 NOTE — CONSULTS
PALLIATIVE MEDICINE CONSULTATION     Patient name:Brayan Alvarenga   GJJ:[id number]    :1930  Room/Bed:[facility]3376/3376-01   LOS: 1 day         Date of consult:2022    Consult Information  Palliative Medicine Consult performed by: SULEMAN Llamas CNP, CNP    Inpatient consult to Palliative Care  Consult performed by: SULEMAN Llamas CNP  Consult ordered by: Clemencia Gusman MD  Reason for consult: GOC and code status             ASSESSMENT/RECOMMENDATIONS     80 y.o. male with AMS and abd pain with sepsis      Symptom Management:  AMS- pt drowsy and lethargic he is oriented to self and place per daughter he is oriented x 4 but forgetful at baseline  Abdominal pain- due to acute edis surgery managing   Goals of Care- talked to pts daughter and son-in law at bedside they state that pt has rapidly declined in last 24 hours they do not want any surgery for him and they actually asked about going to [address] I advised them to give additional time for medical management prior to making decision pts son who is HCPOA is in transit family plans to meet with surgery again in the am but if they feel that pt is still declining and continues to \"look this miserable\" they want him to go to the Hospice on [facility] where they have had 3 other family members go. Per daughter pt should be a DNR that he is a DNR at baseline however she is not HCPOA pt was [facility] for admission in August and [place] agreed to that at the time. Based on chart review and family reports will update to [facility] at this time. Patient/Family Goals of Care :    talked to pts daughter and son-in law at bedside they state that pt has rapidly declined in last 24 hours they do not want any surgery for him and they actually asked about going to [address]  I advised them to give additional time for medical management prior to making decision pts son who is HCPOA is in transit family plans to meet with surgery again in the am but if they feel that pt is still declining and continues to \"look this miserable\" they want him to go to the Hospice on [facility] where they have had 3 other family members go. Per daughter pt should be a DNR that he is a DNR at baseline however she is not HCPOA pt was [facility] for admission in August and [place] agreed to that at the time. Based on chart review and family reports will update to [facility] at this time. Disposition/Discharge Plan:   pending    Advance Directives:  Surrogate Decision Maker: Terri Garza- daughter  Code status:  DNR-CCA    Case discussed with: patient, floor RN  Thank you for allowing us to participate in the care of this patient. HISTORY     CC: Abdominal pain  HPI: The patient is a 80 y.o. male  presents to the emergency department due to fatigue noticed by the daughter. Patient's daughter is at bedside and states that he is history of dementia and they have been noticing that he has been getting more fatigued and forgetful of things. She states that he has been eating he gets Meals on Wheels delivered to his house every day. Patient states that he only has some right shoulder pain has been going on for several weeks. CT showed acute cholecystitis with gall stones. Palliative Medicine SymptomScreening/ROS:    Review of Systems   Constitutional:  Positive for activity change, appetite change, fatigue, fever and unexpected weight change. HENT:  Positive for congestion. Negative for trouble swallowing. Eyes: Negative. Respiratory:  Positive for shortness of breath. Cardiovascular:  Positive for leg swelling. Gastrointestinal:  Positive for abdominal distention, abdominal pain, nausea and vomiting. Endocrine: Negative. Genitourinary: Negative. Musculoskeletal:  Positive for arthralgias. Skin:  Positive for pallor. Allergic/Immunologic: Negative. Neurological:  Positive for weakness. Psychiatric/Behavioral:  Positive for confusion. Palliative Performance Scale:     [] 60%  Amb reduced; Sig dz. Can't do hobbies/housework; Intake normal or reduced, Occasional assist; LOC full/confusion   [x] 50%  Mainly sit/lie; Extensive disease. Mainly assist, Intake normal or reduced; Occasional assist; LOC full/confusion   [] 40%  Mainly in bed; Extensive disease; Mainly assist; Intake normal or reduced; Occasional assist; LOC full/confusion   [] 30%  Bed bound, Extensive disease; Total care; Intake reduced; LOC full/confusion   [] 20%  Bed bound; Extensive disease; Total care; Intake minimal; Drowsy/coma   [] 10%  Bed bound; Extensive disease; Total care; Mouth care only; Drowsy/coma   []  0%   Death       Home med list and hospital medications reviewed in chart as of 11/7/2022     EXAM     Vitals:    11/07/22 1445   BP: (!) 167/108   Pulse: (!) 118   Resp:    Temp:    SpO2:        Physical Exam  Constitutional:       Appearance: He is ill-appearing. Comments: Drowsy and lethargic   HENT:      Head: Normocephalic and atraumatic. Nose: Nose normal.      Mouth/Throat:      Mouth: Mucous membranes are dry. Eyes:      Pupils: Pupils are equal, round, and reactive to light. Cardiovascular:      Rate and Rhythm: Normal rate. Pulses: Normal pulses. Heart sounds: No murmur heard. No gallop. Pulmonary:      Effort: Pulmonary effort is normal.   Chest:      Chest wall: Tenderness present. Abdominal:      General: There is distension. Tenderness: There is abdominal tenderness. There is guarding. Musculoskeletal:      Cervical back: Neck supple. Right lower leg: Edema present. Left lower leg: Edema present. Skin:     General: Skin is dry. Coloration: Skin is pale. Neurological:      Mental Status: Mental status is at baseline. He is disoriented. Psychiatric:         Mood and Affect: Mood normal.         Behavior: Behavior normal.         Thought Content:  Thought content normal.         Judgment: Judgment normal.              Current labs in the epic chart reviewed as of 11/7/2022   Review of previous notes, admits, labs, radiology and testing relevant to this consult done in this chart today 11/7/2022      Total time: 70 minutes  >50% of time spent counseling patient at bedside or POA/family member if applicable , reviewing information and discussing care, coordinating with care team  Signed By: Electronically signed by SULEMAN Jaeger CNP on 11/7/2022 at 3:13 PM  Palliative Medicine   [phone]    November 7, 2022

## 2022-11-07 NOTE — PROGRESS NOTES
Patient noted to remain tachycardic , even after administration of scheduled  medications. Current HR is 118. BP is also elevated at 167/109. Patient also had an episode of nausea and vomiting. Sodium phosphate is currently running per protocol for sodium-134 and phospherous 1.8. Speech therapy did see patient and made him NPO as he was silently aspirating. Paliative care is currently at bedside speaking with family. New order received for diltiazem continuous infusion.

## 2022-11-07 NOTE — PROGRESS NOTES
845- Secure message sent  to Lisa Leyden for clarification. Patient's family is asking if he can have something to drink and also food. Speech evaluation put in to evaluate patient's swallowing. 956-Bedside swallow evaluation complete by this writer. Patient was able to swallow thin liquids without difficulty. Morning medications given. Patient is asking for food at this time.  notified via secure message. 1004- Secure message sent to  for clarification on diet order for patient.

## 2022-11-07 NOTE — PROGRESS NOTES
Occupational Therapy/ Physical Therapy  Elias Perez to nurse who stated to please hold this date due to medical issues. Possible surgery for acute cholecystitis. Nurse also stated has labored breathing. Will continue to monitor and evaluate once medically stable. Thank you,  Alexandra Ovalle.  Norlene Goodpasture, OTR/L [address]  Stacie Apodaca, [address] [place] [address]

## 2022-11-07 NOTE — PROGRESS NOTES
Hospitalist Progress Note      PCP: May Sanabria MD    Date of Admission: 11/6/2022    Chief Complaint: Abdominal pain with altered mental status    Hospital Course:  Phillip Loo is a 80 y.o. male, A. fib on Coumadin, BPH, history of colon cancer s/p resection brought in by family for further evaluation of generalized weakness, and abdominal pain for several days associated with abdominal distention, constipation, difficulty urinating & AMS. There was no nausea, vomiting, fever or chills, shortness of breath or chest pain. Work-up in the ED was positive for acute cholecystitis. Subjective: Patient seen and examined with family at bedside. Ill looking, lethargic and drowsy. In moderate respiratory distress, tachycardic. Requiring 4 L nasal cannula.       Medications:  Reviewed    Infusion Medications    dilTIAZem      sodium chloride 75 mL/hr at 11/07/22 0843    sodium chloride      dextrose       Scheduled Medications    ipratropium-albuterol  1 ampule Inhalation Q4H WA    piperacillin-tazobactam  3,375 mg IntraVENous Q8H    dorzolamide  1 drop Right Eye BID    insulin lispro  0-8 Units SubCUTAneous TID WC    insulin lispro  0-4 Units SubCUTAneous Nightly    sodium chloride flush  5-40 mL IntraVENous 2 times per day    enoxaparin  30 mg SubCUTAneous BID    brimonidine  1 drop Right Eye BID    And    timolol  1 drop Right Eye BID     PRN Meds: [DISCONTINUED] potassium chloride **OR** [DISCONTINUED] potassium alternative oral replacement **OR** potassium chloride, magnesium sulfate, sodium phosphate IVPB **OR** sodium phosphate IVPB **OR** sodium phosphate IVPB, sodium chloride flush, sodium chloride, ondansetron **OR** ondansetron, polyethylene glycol, acetaminophen **OR** acetaminophen, dextrose bolus **OR** dextrose bolus, glucagon (rDNA), dextrose      Intake/Output Summary (Last 24 hours) at 11/7/2022 1642  Last data filed at 11/7/2022 0019  Gross per 24 hour   Intake 10 ml   Output --   Net 10 ml       Physical Exam Performed:    BP (!) 172/90   Pulse (!) 130   Temp 97.7 °F (36.5 °C) (Oral)   Resp 21   Ht 5' 10\" (1.778 m)   Wt 230 lb (104.3 kg)   SpO2 93%   BMI 33.00 kg/m²     General appearance: No apparent distress, appears stated age and cooperative. HEENT: Pupils equal, round, and reactive to light. Conjunctivae/corneas clear. Neck: Supple, with full range of motion. No jugular venous distention. Trachea midline. Respiratory: Increased respiratory effort. Decreased air entry bilaterally with Rales & wheezing. Cardiovascular: Regular rate and rhythm with normal S1/S2 without murmurs, rubs or gallops. Abdomen: Soft, non-tender, non-distended with normal bowel sounds. Musculoskeletal: No clubbing, cyanosis or edema bilaterally. Full range of motion without deformity. Skin: Skin color, texture, turgor normal.  No rashes or lesions. Neurologic:  Neurovascularly intact without any focal sensory/motor deficits.  Cranial nerves: II-XII intact, grossly non-focal.  Psychiatric: Alert and oriented, thought content appropriate, normal insight  Capillary Refill: Brisk, 3 seconds, normal   Peripheral Pulses: +2 palpable, equal bilaterally       Labs:   Recent Labs     11/06/22 1136 11/07/22 0417   WBC 20.2* 17.3*   HGB 13.1* 12.3*   HCT 38.7* 37.3*    170     Recent Labs     11/06/22 1136 11/07/22 0417   * 134*   K 4.3 3.7   CL 95* 96*   CO2 27 28   BUN 13 13   CREATININE 0.8 0.6*   CALCIUM 9.6 9.6   PHOS  --  1.8*     Recent Labs     11/06/22 1136 11/07/22 0417   AST 19 17   ALT 13 11   BILIDIR  --  0.5*   BILITOT 1.7* 1.4*   ALKPHOS 81 81     Recent Labs     11/06/22 1136 11/07/22 0417   INR 2.75* 3.29*     Recent Labs     11/06/22 1136   TROPONINI <0.01       Urinalysis:      Lab Results   Component Value Date/Time    NITRU Negative 11/06/2022 04:26 PM    WBCUA 1 11/06/2022 04:26 PM    BACTERIA None Seen 11/06/2022 04:26 PM    RBCUA 25 11/06/2022 04:26 PM BLOODU MODERATE 11/06/2022 04:26 PM    SPECGRAV 1.025 11/06/2022 04:26 PM    GLUCOSEU >=1000 11/06/2022 04:26 PM    GLUCOSEU NEGATIVE 08/02/2011 09:07 AM       Radiology:  CT ABDOMEN PELVIS W IV CONTRAST Additional Contrast? None   Final Result   1. Findings suggestive of acute cholecystitis with small gallstones present. No evidence for biliary dilatation. 2.  Simple appearing cystic structures in the pancreas measuring up to 3 cm. Several of these were in the field of view on CT imaging in 2021. 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 Given the patient's   advanced age and relative stability of the largest lesions over 1 year, no   specific follow-up is necessary. 3.  Prostate enlargement with bladder distension and pseudo diverticula,   suggestive of some degree of bladder outlet obstruction and hypertrophy. Question debris or focal bladder wall thickening posteriorly at the apex. 4.  Infrarenal aortic enlargement measuring up to 2.8 cm. Please see   recommendation below. RECOMMENDATIONS:   2.8 cm infrarenal abdominal aortic aneurysm suspected. Recommend follow-up   every 5 years. Reference: J Am Eduardo Radiol 5344;[id number]. XR CHEST PORTABLE   Final Result      1. Opacity in the lateral left mid lower lung field, at inferior right upper   lobe and right lower lung are either unchanged or in the case of some of the   right lower lung findings, sightly improved. Based on prior studies   including CT scans, majority of this appears to be due to scarring or   fibrosis. Small area of superimposed acute pneumonitis would be difficult to   exclude though no definitive acute pulmonary infiltrate is noted. 2.  Stable cardiomegaly.                  Assessment/Plan:    Active Hospital Problems    Diagnosis     Leukocytosis [D72.829]      Priority: Medium    Sepsis ([address] [A41.9]      Priority: Medium     Sepsis due to acute cholecystitis POA:  CT findings suggestive of acute cholecystitis with small gallstones. Surgery consulted: No recommendation for cholecystectomy as family wishes to proceed with nonoperative management. Palliative care consulted: Currently DNR CCA but family requesting inpatient hospice. Continue IV fluids, Zosyn and NPO. Pain control antiemetics. Blood cultures pending. Acute hypoxic respiratory failure:  Patient previously not on supplemental oxygen now requiring 4 L nasal cannula. Chest x-ray: Bibasilar opacities concerning for fibrosis/scarring unable to rule out superimposed acute pneumonia/pneumonitis. Continue antibiotics & supplemental oxygen. DuoNeb as needed. Acute metabolic encephalopathy: Due to sepsis. Continue current management and monitor. Dysphagia: SLP consulted recommending strict NPO for now. AFIB RVR:  Patient currently NPO. Cardizem and metoprolol were held. Started on Cardizem drip. Coumadin on hold. INR 3.29. T2DM not on long-term insulin: Monitor blood glucose on SSI.    BPH: Flomax on hold. Infrarenal aortic aneurysm:  2.8 cm infrarenal abdominal aortic aneurysm noted incidentally on CT. 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      DVT Prophylaxis: Lovenox  Diet: Diet NPO  Code Status: DNR-CCA  PT/OT Eval Status: requested    Dispo - once medically stable.       Aden Schrader MD

## 2022-11-07 NOTE — PROGRESS NOTES
Medium amount of emesis from patient. Pt cleaned up and linens changed. Zofran given for nausea. Pt complaining of sore throat after coughing up large amounts of mucous. Tissues at patients bedside. Pt NPO for surgery to see in the am. Bed alarm on and call light within reach.

## 2022-11-07 NOTE — PLAN OF CARE
Problem: Safety - Adult  Goal: Free from fall injury  11/7/2022 1511 by Tushar Brenner RN  Outcome: Progressing  11/7/2022 1117 by Tushar Brenner RN  Outcome: Progressing

## 2022-11-08 NOTE — PROGRESS NOTES
Patient's HR went from 70s to 30s and 20s very quickly shown on the telemetry. This RN went to check on patient and family. After a few minutes patient had stopped breathing. This RN listened to his heart, there is no heartbeat. I as well palpated for a pulse and I did not find one. MD notified to come pronounce. Family bedside.

## 2022-11-08 NOTE — PLAN OF CARE
Problem: Discharge Planning  Goal: Discharge to home or other facility with appropriate resources  Outcome: Progressing     Problem: Pain  Goal: Verbalizes/displays adequate comfort level or baseline comfort level  Outcome: Progressing  Note: Morphine given for air hunger     Problem: Safety - Adult  Goal: Free from fall injury  Outcome: Progressing     Problem: ABCDS Injury Assessment  Goal: Absence of physical injury  Outcome: Progressing     Problem: Skin/Tissue Integrity  Goal: Absence of new skin breakdown  Description: 1. Monitor for areas of redness and/or skin breakdown  2. Assess vascular access sites hourly  3. Every 4-6 hours minimum:  Change oxygen saturation probe site  4. Every 4-6 hours:  If on nasal continuous positive airway pressure, respiratory therapy assess nares and determine need for appliance change or resting period.   Outcome: Progressing

## 2022-11-08 NOTE — PROGRESS NOTES
All his children are bedside at this time. They are requesting a  for pts last rights. I called their  at [facility]. He said the soonest he can get here is between 930-10am. Spiritual care was consulted as well. I also requested comfort meds for pt. Morphine and valium ordered, pts family not requesting it yet. No other concerns from the family at this time.

## 2022-11-08 NOTE — PROGRESS NOTES
PALLIATIVE MEDICINE PROGRESS NOTE     Patient name:Brayan Woodward     :1930  Room/Bed:[facility]3376/3376-01    LOS: 2 days        ASSESSMENT/RECOMMENDATIONS     80 y.o. male with AMS and abd pain with sepsis        Symptom Management:  AMS- pt drowsy and lethargic he is oriented to self and place per daughter he is oriented x 4 but forgetful at baseline  Abdominal pain- due to acute edis surgery managing   Goals of Care- [facility]. Talked to family at bedside pt has declined the family wants to focus on comfort and understands that pt prognosis is poor and he may pass away today. We discussed that he likely is not stable to transport to [facility] [address] in [place] they all agreed. Will have HOC touch base incase pt stablizes and may be eligible to transport. Patient/Family Goals of Care :      talked to pts daughter and son-in law at bedside they state that pt has rapidly declined in last 24 hours they do not want any surgery for him and they actually asked about going to [address] I advised them to give additional time for medical management prior to making decision pts son who is HCPOA is in transit family plans to meet with surgery again in the am but if they feel that pt is still declining and continues to \"look this miserable\" they want him to go to the Hospice on [place] where they have had 3 other family members go. Per daughter pt should be a DNR that he is a DNR at baseline however she is not HCPOA pt was [facility] for admission in August and Dory agreed to that at the time. Based on chart review and family reports will update to [facility] at this time.   DNRCC. Talked to family at bedside pt has declined the family wants to focus on comfort and understands that pt prognosis is poor and he may pass away today. We discussed that he likely is not stable to transport to [facility] [address] in [place] they all agreed. Will have HOC touch base incase pt stablizes and may be eligible to transport. Disposition/Discharge Plan:   pending     Advance Directives:  Surrogate Decision Maker: Dejon Rocha son/celestineoa  Code status:  DNR-CCA     Case discussed with: patient, floor RN  Thank you for allowing us to participate in the care of this patient. SUBJECTIVE     Chief Complaint: Dyspnea     Last 24 hours:   Pt is somnolent with increased oxygen demand today     ROS:    Review of Systems   Unable to perform ROS: Patient unresponsive             OBJECTIVE   /68   Pulse 87   Temp 97.3 °F (36.3 °C) (Axillary)   Resp 23   Ht 5' 10\" (1.778 m)   Wt 230 lb (104.3 kg)   SpO2 (!) 88%   BMI 33.00 kg/m²   I/O last 3 completed shifts: In: 10 [I.V.:10]  Out: -   No intake/output data recorded. Physical Exam  Constitutional:       Appearance: He is ill-appearing. Comments: Drowsy and lethargic   HENT:      Head: Normocephalic and atraumatic. Nose: Nose normal.      Mouth/Throat:      Mouth: Mucous membranes are dry. Eyes:      Pupils: Pupils are equal, round, and reactive to light. Cardiovascular:      Rate and Rhythm: Normal rate. Pulses: Normal pulses. Heart sounds: No murmur heard. No gallop. Pulmonary:      Effort: Pulmonary effort is normal.   Chest:      Chest wall: Tenderness present. Abdominal:      General: There is distension. Tenderness: There is abdominal tenderness. There is guarding. Musculoskeletal:      Cervical back: Neck supple. Right lower leg: Edema present. Left lower leg: Edema present. Skin:     General: Skin is dry. Coloration: Skin is pale. Neurological:      Mental Status: He is disoriented.       Comments: somnulent         Total time: 65 minutes  >50% of time spent counseling patient at bedside or POA/family member if applicable , reviewing information and discussing care, coordinating with care team       Signed By: Electronically signed by SULEMAN Veloz CNP on 11/8/2022 at [address] [place] [address]   [phone] November 8, 2022

## 2022-11-08 NOTE — RT PROTOCOL NOTE
RT Nebulizer Bronchodilator Protocol Note    There is a bronchodilator order in the chart from a provider indicating to follow the RT Bronchodilator Protocol and there is an Initiate RT Bronchodilator Protocol order as well (see protocol at bottom of note). CXR Findings:  XR CHEST PORTABLE    Result Date: 11/6/2022  1. Opacity in the lateral left mid lower lung field, at inferior right upper lobe and right lower lung are either unchanged or in the case of some of the right lower lung findings, sightly improved. Based on prior studies including CT scans, majority of this appears to be due to scarring or fibrosis. Small area of superimposed acute pneumonitis would be difficult to exclude though no definitive acute pulmonary infiltrate is noted. 2.  Stable cardiomegaly. The findings from the last RT Protocol Assessment were as follows:  Smoking: Smoker 15 pack years or more  Respiratory Pattern: Regular pattern and RR 12-20 bpm  Breath Sounds: Inspiratory and expiratory or bilateral wheezing and/or rhonchi  Cough: Strong, spontaneous, non-productive  Indication for Bronchodilator Therapy: Wheezing associated with pulm disorder  Bronchodilator Assessment Score: 7    Aerosolized bronchodilator medication orders have been revised according to the RT Nebulizer Bronchodilator Protocol below. Respiratory Therapist to perform RT Therapy Protocol Assessment initially then follow the protocol. Repeat RT Therapy Protocol Assessment PRN for score 0-3 or on second treatment, BID, and PRN for scores above 3. No Indications - adjust the frequency to every 6 hours PRN wheezing or bronchospasm, if no treatments needed after 48 hours then discontinue using Per Protocol order mode. If indication present, adjust the RT bronchodilator orders based on the Bronchodilator Assessment Score as indicated below. If a patient is on this medication at home then do not decrease Frequency below that used at home.     0-3 - enter or revise RT bronchodilator order(s) to equivalent RT Bronchodilator order with Frequency of every 4 hours PRN for wheezing or increased work of breathing using Per Protocol order mode. 4-6 - enter or revise RT Bronchodilator order(s) to two equivalent RT bronchodilator orders with one order with BID Frequency and one order with Frequency of every 4 hours PRN wheezing or increased work of breathing using Per Protocol order mode. 7-10 - enter or revise RT Bronchodilator order(s) to two equivalent RT bronchodilator orders with one order with TID Frequency and one order with Frequency of every 4 hours PRN wheezing or increased work of breathing using Per Protocol order mode. 11-13 - enter or revise RT Bronchodilator order(s) to one equivalent RT bronchodilator order with QID Frequency and an Albuterol order with Frequency of every 4 hours PRN wheezing or increased work of breathing using Per Protocol order mode. Greater than 13 - enter or revise RT Bronchodilator order(s) to one equivalent RT bronchodilator order with every 4 hours Frequency and an Albuterol order with Frequency of every 2 hours PRN wheezing or increased work of breathing using Per Protocol order mode. RT to enter RT Home Evaluation for COPD & MDI Assessment order using Per Protocol order mode.     Electronically signed by Mary Urbina RCP on 11/7/2022 at 8:49 PM

## 2022-11-08 NOTE — DEATH NOTES
Death Pronouncement Note  Patient's Name: Marily Gunderson   Patient's YOB: 1930  MRN Number: [id number]    Admitting Provider: Rayne Spaulding MD  Attending Provider: Radha Mckinnon MD    Patient was examined and the following were absent: Pulses, Blood Pressure, and Respiratory effort    I declared the patient dead on  at 1200 PM    Preliminary Cause of Death:   Sepsis due to acute Cholecystitis    Electronically signed by Radha Mckinnon MD on 11/8/22 at 12:10 PM EST

## 2022-11-08 NOTE — PROGRESS NOTES
[facility]        Progress Note    Patient Name: Keyur Hood  MRN: [id number]  YOB: 1930  Date of Evaluation: 2022    Chief Complaint: Abdominal pain    Subjective:   Increased oxygen needs and lethargy overnight  Unresponsive  No report of nausea or vomiting  Resting in bed at this time, family at bedside      Vital Signs:  Patient Vitals for the past 24 hrs:   BP Temp Temp src Pulse Resp SpO2   22 0450 123/68 97.3 °F (36.3 °C) Axillary 87 23 (!) 88 %   22 0330 -- -- -- -- -- 95 %   22 0301 (!) 141/68 98.3 °F (36.8 °C) Oral 84 26 90 %   22 0245 -- -- -- 90 -- 90 %   22 0010 (!) 151/80 98.4 °F (36.9 °C) Oral (!) 109 20 92 %   22 2340 (!) 157/91 -- -- (!) 103 -- --   22 2310 (!) 155/85 -- -- (!) 110 -- --   22 2240 (!) 161/97 -- -- (!) 110 -- --   22 -- -- -- (!) 113 22 97 %   22 2210 (!) 158/99 -- -- (!) 115 -- --   22 (!) 161/86 -- -- (!) 119 -- --   22 (!) 174/91 -- -- (!) 118 -- --   22 (!) 172/97 -- -- (!) 122 -- --   22 (!) 168/101 -- -- (!) 118 -- --   22 (!) 164/92 -- -- (!) 116 -- --   22 (!) 169/102 -- -- (!) 130 -- --   22 (!) 161/90 98.3 °F (36.8 °C) Axillary (!) 108 20 96 %   22 1830 (!) 157/88 -- -- (!) 116 -- --   22 1800 (!) 141/90 98.3 °F (36.8 °C) Axillary (!) 122 -- 93 %   22 1745 132/84 98.2 °F (36.8 °C) Oral (!) 121 -- --   22 1730 (!) 148/88 98.3 °F (36.8 °C) Oral (!) 128 20 95 %   22 1619 (!) 172/90 97.7 °F (36.5 °C) Oral (!) 130 21 93 %   22 1445 (!) 167/108 -- -- (!) 118 -- --   22 1233 (!) 162/89 -- -- (!) 119 -- --   22 1203 (!) 170/76 98.2 °F (36.8 °C) Oral (!) 120 22 95 %   22 0947 -- -- -- (!) 116 -- --      TEMPERATURE HISTORY 24H: Temp (24hrs), Av.1 °F (36.7 °C), Min:97.3 °F (36.3 °C), Max:98.4 °F (36.9 °C)    BLOOD PRESSURE HISTORY: Systolic (60TZP), FSN:036 , Min:123 , YVS:387    Diastolic (20FBU), EWS:72, Min:65, Max:108      Intake/Output:  I/O last 3 completed shifts: In: 10 [I.V.:10]  Out: -   No intake/output data recorded. Drain/tube Output:       Physical Exam:  General: unarousable  Cardiovascular:  regular rate and rhythm and no murmur noted  Lungs: rhonchi diffuse  Abdomen: soft, nontender (difficulty to fully assess given lethargy), mildly distended, bowel sounds normal     Labs:  CBC:    Recent Labs     11/06/22  1136 11/07/22  0417   WBC 20.2* 17.3*   HGB 13.1* 12.3*   HCT 38.7* 37.3*    170     BMP:    Recent Labs     11/06/22 1136 11/07/22 0417   * 134*   K 4.3 3.7   CL 95* 96*   CO2 27 28   BUN 13 13   CREATININE 0.8 0.6*   GLUCOSE 322* 190*     Hepatic:    Recent Labs     11/06/22  1136 11/07/22  0417   AST 19 17   ALT 13 11   BILITOT 1.7* 1.4*   ALKPHOS 81 81     Amylase:  No results found for: AMYLASE  Lipase:    Lab Results   Component Value Date/Time    LIPASE 20.0 11/07/2022 04:17 AM      Mag:    Lab Results   Component Value Date/Time    MG 1.80 11/07/2022 04:17 AM     Phos:     Lab Results   Component Value Date/Time    PHOS 1.8 11/07/2022 04:17 AM    PHOS 2.7 08/15/2022 11:43 AM      Coags:   Lab Results   Component Value Date/Time    PROTIME 33.7 11/07/2022 04:17 AM    PROTIME 31 09/13/2018 10:30 AM    INR 3.29 11/07/2022 04:17 AM    APTT 61.9 11/07/2022 04:17 AM       Cultures:  Anaerobic culture  No results found for: LABANAE  Fungus stain  No results found for requested labs within last 30 days. Gram stain  No results found for requested labs within last 30 days. Organism  No results found for: [facility]  Surgical culture  No results found for: CXSURG  Blood culture 1  Results in Past 30 Days  Result Component Current Result Ref Range Previous Result Ref Range   Blood Culture, Routine No Growth to date. Any change in status will be called.  (11/6/2022)  Not in Time Range      Blood culture 2  Results in Past 30 Days  Result Component Current Result Ref Range Previous Result Ref Range   Culture, Blood 2 No Growth to date. Any change in status will be called. (11/6/2022)  Not in Time Range      Fecal occult  No results found for requested labs within last 30 days. GI bacterial pathogens by PCR  No results found for requested labs within last 30 days. C. difficile  No results found for requested labs within last 30 days. Urine culture  Lab Results   Component Value Date/Time    LABURIN No growth at 18 to 36 hours 08/14/2021 04:30 PM       Pathology:  No relevant pathology     Imaging:  I have personally reviewed the following films:    CT ABDOMEN PELVIS W IV CONTRAST Additional Contrast? None    Result Date: 11/6/2022  EXAMINATION: CT OF THE ABDOMEN AND PELVIS WITH CONTRAST 11/6/2022 2:23 pm TECHNIQUE: CT of the abdomen and pelvis was performed with the administration of intravenous contrast. Multiplanar reformatted images are provided for review. Automated exposure control, iterative reconstruction, and/or weight based adjustment of the mA/kV was utilized to reduce the radiation dose to as low as reasonably achievable. COMPARISON: Chest radiograph today and chest CT 08/17/2021. No prior abdominal CT imaging available. HISTORY: ORDERING SYSTEM PROVIDED HISTORY: distension, constipation TECHNOLOGIST PROVIDED HISTORY: Reason for exam:->distension, constipation Additional Contrast?->None Decision Support Exception - unselect if not a suspected or confirmed emergency medical condition->Emergency Medical Condition (MA) Reason for Exam: Altered Mental Status (Patients daughter complains of lethargy in patient for a few weeks.  ) FINDINGS: Lower Chest: Course reticular and ground-glass opacities in the lung bases again demonstrated and grossly unchanged, likely reflecting chronic interstitial lung disease. Organs:  The gallbladder is distended with small stones present, wall thickening and surrounding fat stranding suggestive of acute cholecystitis. No evidence for biliary dilatation. Multiple small cysts are present in the pancreatic body, head and neck measuring up to 3 cm. No evidence for pancreatic duct dilatation. The liver, spleen, adrenals and kidneys reveal no acute findings. Mild thickening of the left adrenal gland again demonstrated. GI/Bowel: There is no bowel dilatation or wall thickening identified. Moderate stool burden throughout the colon. Pelvis: Bladder is well distended with findings compatible with pseudo diverticula. Prostate enlargement noted. There is suggestion of focal wall thickening versus layering debris posteriorly in the apex. Peritoneum/Retroperitoneum: No free air, ascites or lymphadenopathy. Infrarenal aortic enlargement measuring 2.8 x 2.7 cm. Calcified atheromatous plaque is present. Bones/Soft Tissues: No acute findings. Advanced disc disease and facet arthropathy in the lower lumbar spine. Moderate degenerative change in the hips. 1.  Findings suggestive of acute cholecystitis with small gallstones present. No evidence for biliary dilatation. 2.  Simple appearing cystic structures in the pancreas measuring up to 3 cm. Several of these were in the field of view on CT imaging in 2021. 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 Given the patient's advanced age and relative stability of the largest lesions over 1 year, no specific follow-up is necessary. 3.  Prostate enlargement with bladder distension and pseudo diverticula, suggestive of some degree of bladder outlet obstruction and hypertrophy. Question debris or focal bladder wall thickening posteriorly at the apex. 4.  Infrarenal aortic enlargement measuring up to 2.8 cm. Please see recommendation below. RECOMMENDATIONS: 2.8 cm infrarenal abdominal aortic aneurysm suspected. Recommend follow-up every 5 years. Reference: J Am Eduardo Radiol 3232;28:369-107.      XR CHEST PORTABLE    Result Date: 11/6/2022  EXAM: XR Chest, 1 View EXAM DATE/TIME: 11/6/2022 11:43 am CLINICAL HISTORY: ORDERING SYSTEM PROVIDED  Fatigue, rales on exam  TECHNOLOGIST PROVIDED HISTORY:  Reason for exam:->Fatigue, rales on exam  Reason for Exam: Altered Mental Status (Patients daughter complains of lethargy in patient for a few weeks.  ) TECHNIQUE: Frontal view of the chest. COMPARISON: 08/14/2021 FINDINGS: Lungs:  Opacity in the lateral left mid lower lung field, at inferior right upper lobe and right lower lung are either unchanged or in the case of some of the right lower lung findings, sightly improved. Based on prior studies including CT scans, majority of this appears to be due to scarring or fibrosis. Small area of superimposed acute pneumonitis would be difficult to exclude though no definitive acute pulmonary infiltrate is noted. Pleural space:  No acute findings. No pneumothorax. Heart:  Stable cardiomegaly. Mediastinum:  No acute findings. Bones/joints:  No acute findings. 1.  Opacity in the lateral left mid lower lung field, at inferior right upper lobe and right lower lung are either unchanged or in the case of some of the right lower lung findings, sightly improved. Based on prior studies including CT scans, majority of this appears to be due to scarring or fibrosis. Small area of superimposed acute pneumonitis would be difficult to exclude though no definitive acute pulmonary infiltrate is noted. 2.  Stable cardiomegaly.       Scheduled Meds:   ipratropium-albuterol  1 ampule Inhalation Q4H WA    QUEtiapine  50 mg Oral Once    piperacillin-tazobactam  3,375 mg IntraVENous Q8H    dorzolamide  1 drop Right Eye BID    insulin lispro  0-8 Units SubCUTAneous TID     insulin lispro  0-4 Units SubCUTAneous Nightly    sodium chloride flush  5-40 mL IntraVENous 2 times per day    enoxaparin  30 mg SubCUTAneous BID    brimonidine  1 drop Right Eye BID    And    timolol  1 drop Right Eye BID Continuous Infusions:   dilTIAZem 5 mg/hr (11/08/22 0252)    sodium chloride 75 mL/hr at 11/07/22 2108    sodium chloride      dextrose       PRN Meds:.morphine **OR** morphine, diazePAM, [DISCONTINUED] potassium chloride **OR** [DISCONTINUED] potassium alternative oral replacement **OR** potassium chloride, magnesium sulfate, sodium phosphate IVPB **OR** sodium phosphate IVPB **OR** sodium phosphate IVPB, sodium chloride flush, sodium chloride, ondansetron **OR** ondansetron, polyethylene glycol, acetaminophen **OR** acetaminophen, dextrose bolus **OR** dextrose bolus, glucagon (rDNA), dextrose      Assessment:  80 y.o. male admitted with   1. Leukocytosis, unspecified type    2. Pneumonia due to infectious organism, unspecified laterality, unspecified part of lung    3. Hyponatremia    4. Acute cholecystitis        Cholecystitis  Atrial fibrillation  Medical coagulopathy, on Coumadin  Diabetes  CAD       Plan:  1. Increased oxygen needs and lethargy overnight, patient unresponsive, labored respirations, abdominal exam relatively benign, afebrile, labs pending; continued supportive care, surgery postponed indefinitely given decline in status--Palliative care meeting planned with family today, family interested in Hospice care  2. NPO; monitor bowel function  3. IV hydration; monitor and correct electrolytes  4. Antibiotics  5. Activity as tolerated  6. PRN analgesics and antiemetics--minimizing narcotics as tolerated  7. DVT prophylaxis with  Lovenox  8. Management of medical comorbid etiologies per primary team and consulting services    EDUCATION:  Educated patient's family on plan of care and disease process--all questions answered. Plans discussed with patient's family and nursing. Reviewed and discussed with Dr. Rochelle Friedman. Signed:  SULEMAN Christensen - CNP  11/8/2022 8:48 AM      I have personally performed a face to face diagnostic evaluation on this patient.  I have interviewed and examined the patient and I agree with the assessment above. Time was spent reviewing patient chart (including but not limited to notes, labs, imaging and other testing), interviewing and counseling patient and present family members, performing physical exam, documentation of my findings and subsequent follow up of ordered medication and testing, placing referrals and communication with patient care providers, coordinating future care as well as documentation in the EHR. This encompassed more than 50% of the total encounter time. In summary, my findings and plan are the following:   Mr. Sisi Vital is a 80 y.o. male who presents with   Cholecystitis  Atrial fibrillation  Medical coagulopathy, on coumadin  Diabetes  CAD  Acute respiratory failure     Plan:  Primary issue is respiratory failure with worsening hypoxia. Patient is DNR/DNI and still hypoxic on maximum oxygen through NRB. Patient no longer able to communicate but family is present and will consult hospice care this morning. Continue current supportive management for now until care transitioned. Will be available to help facilitate and for support as needed    Mike Mena MD, FACS  11/8/2022  9:42 AM

## 2022-11-08 NOTE — DISCHARGE SUMMARY
[facility] Medicine  Death/Discharge Summary    Name:Brayan Khan       :  1930              MRN:  [id number]    Admit date:  2022    Discharge date:  2022      Admitting Physician:  José Call MD  Discharge Physician: Baltazar Leventhal, MD            Admission Condition:  serious    Discharged Condition:     History of Present Illness:  Jaret Hess is a 80 y.o. male, A. fib on Coumadin, BPH, history of colon cancer s/p resection brought in by family for further evaluation of generalized weakness, and abdominal pain for several days associated with abdominal distention, constipation, difficulty urinating & AMS. There was no nausea, vomiting, fever or chills, shortness of breath or chest pain. Work-up in the ED was positive for acute cholecystitis. Hospital course was as follows:    Sepsis due to acute cholecystitis POA:  CT findings suggestive of acute cholecystitis with small gallstones. Patient was managed with sepsis protocol including IV fluids, pain control and Zosyn. Surgery consulted: No recommendation for cholecystectomy as family wanted to proceed with nonoperative management. Blood cultures remain negative to date. Acute hypoxic respiratory failure due to suspected pneumonia and pulmonary edema:  Chest x-ray: Bibasilar opacities concerning for fibrosis/scarring unable to rule out superimposed acute pneumonia/pneumonitis. Patient previously not on supplemental oxygen. On admission required 4 L nasal cannula but rapidly declined, requiring 15 L HF NC and nonrebreather. Acute metabolic encephalopathy: Due to sepsis. Dysphagia: SLP consulted recommended strict NPO. AFIB RVR: Due to sepsis and oral medications being held. Started on Cardizem drip. Coumadin held. INR 3.29. T2 DM not on long-term insulin: Monitored blood glucose on SSI.     BPH: Flomax held.       Infrarenal aortic aneurysm:  2.8 cm infrarenal abdominal aortic aneurysm noted incidentally on CT. 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        Plan of care was as above. Patient rapidly deteriorated. Mental status declined with severe agitation requiring Geodon and Zyprexa. Oxygen requirement also increased with patient saturating 90% on nonrebreather and 15 L high flow nasal cannula. Family declined any further evaluation or treatment and requested that patient be made comfortable only. Palliative care was consulted and patient started on comfort care measures after which he shortly passed. Discharge Physical Exam:    Called by nurse that patient had become asystole on telemetry. Patient seen and examined. No palpable pulse. Pupils fixed and dilated. No cardiac or pulmonary activity. Patient pronounced dead.      Time of Death: 1200 PM    Cause of Death:   Sepsis due to acute cholecystitis  Acute hypoxic respiratory failure due to suspected pneumonia and pulmonary edema        Disposition:   [facility]    Time spent on Discharged 30 minutes      Signed:  Maribell Pérez MD, MD  11/8/2022, 12:23 PM

## 2022-11-08 NOTE — PROGRESS NOTES
Patient's body prepared and security notified and transport set up for the morgue. However,  home came to the unit with security and picked the body up, all paperwork signed, and patient's body en route to  home.

## 2022-11-08 NOTE — PROGRESS NOTES
RN referral for Sacramental ministry-patient's , Fr. Marroquin here per family request for Sacrament of Sick/Last Rites. Family at bedside for [employer]. Patent  s/p sacraments with family gathered at bedside.

## 2022-11-08 NOTE — PROGRESS NOTES
Pt has been getting more confused and agitated the past couple hours. Gave IM haldol at 2353, which was not sucessful. Pt more restless and agitated. MD paged for something else. Also at this time pts O2 demands are increasing. He went from 5L at 0000, to 12L at 0200, then 15L at 0300, O2 sats still 89%. RT was called to try to suction and to be placed on a non-rebreather. Unable to suction, but pt is on non-rebreather. Pt appears to be actively dying, Is a DNR-CCA, family was supposed to talk to palliative about hospice this AM. Pts vitals are stable, HR is coming down but still in a-fib. Pt was given IM zyprexa, and he is now resting. Son (medical POA) is at bedside. I told him to call family in, just in case pt goes in to respiratory arrest before morning.

## 2022-11-08 NOTE — PROGRESS NOTES
Occupational Therapy/ Physical Therapy  Lulu Drake  6/9/1930      Per chart review, pt status has declined and pt is awaiting last rites. Confirmed discharging OT/PT orders at this time with RN. Please re-order if pt status changes and therapy is appropriate.      Thank you,  Malu Chavez, OTR/L, C/NDT ([id number])  Elena Cardona, PT, DPT #[id number]

## 2022-11-10 LAB
BLOOD CULTURE, ROUTINE: NORMAL
CULTURE, BLOOD 2: NORMAL
